# Patient Record
Sex: MALE | Race: WHITE | NOT HISPANIC OR LATINO | Employment: OTHER | URBAN - NONMETROPOLITAN AREA
[De-identification: names, ages, dates, MRNs, and addresses within clinical notes are randomized per-mention and may not be internally consistent; named-entity substitution may affect disease eponyms.]

---

## 2017-07-05 ENCOUNTER — APPOINTMENT (OUTPATIENT)
Dept: CT IMAGING | Facility: HOSPITAL | Age: 82
End: 2017-07-05

## 2017-07-05 ENCOUNTER — APPOINTMENT (OUTPATIENT)
Dept: GENERAL RADIOLOGY | Facility: HOSPITAL | Age: 82
End: 2017-07-05

## 2017-07-05 ENCOUNTER — HOSPITAL ENCOUNTER (EMERGENCY)
Facility: HOSPITAL | Age: 82
Discharge: HOME | End: 2017-07-05
Attending: EMERGENCY MEDICINE | Admitting: EMERGENCY MEDICINE

## 2017-07-05 VITALS
HEART RATE: 60 BPM | BODY MASS INDEX: 23.3 KG/M2 | DIASTOLIC BLOOD PRESSURE: 75 MMHG | SYSTOLIC BLOOD PRESSURE: 187 MMHG | WEIGHT: 145 LBS | HEIGHT: 66 IN | OXYGEN SATURATION: 98 % | RESPIRATION RATE: 18 BRPM | TEMPERATURE: 98 F

## 2017-07-05 DIAGNOSIS — M25.551 PAIN OF RIGHT HIP JOINT: ICD-10-CM
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
## 2017-07-05 DIAGNOSIS — S20.211A RIB CONTUSION, RIGHT, INITIAL ENCOUNTER: ICD-10-CM

## 2017-07-05 LAB
ALBUMIN SERPL-MCNC: 4.3 G/DL (ref 3.5–5)
ALBUMIN/GLOB SERPL: 1.4 G/DL (ref 1–2)
ALP SERPL-CCNC: 94 U/L (ref 38–126)
ALT SERPL W P-5'-P-CCNC: 43 U/L (ref 13–69)
ANION GAP SERPL CALCULATED.3IONS-SCNC: 15.7 MMOL/L
AST SERPL-CCNC: 37 U/L (ref 15–46)
BACTERIA UR QL AUTO: ABNORMAL /HPF
BILIRUB SERPL-MCNC: 0.6 MG/DL (ref 0.2–1.3)
BILIRUB UR QL STRIP: NEGATIVE
BUN BLD-MCNC: 39 MG/DL (ref 7–20)
BUN/CREAT SERPL: 26 (ref 6.3–21.9)
CALCIUM SPEC-SCNC: 9.4 MG/DL (ref 8.4–10.2)
CHLORIDE SERPL-SCNC: 106 MMOL/L (ref 98–107)
CLARITY UR: CLEAR
CO2 SERPL-SCNC: 26 MMOL/L (ref 26–30)
COLOR UR: YELLOW
CREAT BLD-MCNC: 1.5 MG/DL (ref 0.6–1.3)
GFR SERPL CREATININE-BSD FRML MDRD: 45 ML/MIN/1.73
GLOBULIN UR ELPH-MCNC: 3.1 GM/DL
GLUCOSE BLD-MCNC: 171 MG/DL (ref 74–98)
GLUCOSE UR STRIP-MCNC: NEGATIVE MG/DL
HGB UR QL STRIP.AUTO: ABNORMAL
HYALINE CASTS UR QL AUTO: ABNORMAL /LPF
KETONES UR QL STRIP: NEGATIVE
LEUKOCYTE ESTERASE UR QL STRIP.AUTO: NEGATIVE
NITRITE UR QL STRIP: NEGATIVE
PH UR STRIP.AUTO: <=5 [PH] (ref 5–8)
POTASSIUM BLD-SCNC: 4.7 MMOL/L (ref 3.5–5.1)
PROT SERPL-MCNC: 7.4 G/DL (ref 6.3–8.2)
PROT UR QL STRIP: NEGATIVE
RBC # UR: ABNORMAL /HPF
REF LAB TEST METHOD: ABNORMAL
SODIUM BLD-SCNC: 143 MMOL/L (ref 137–145)
SP GR UR STRIP: 1.01 (ref 1–1.03)
SQUAMOUS #/AREA URNS HPF: ABNORMAL /HPF
UROBILINOGEN UR QL STRIP: ABNORMAL
WBC UR QL AUTO: ABNORMAL /HPF

## 2017-07-05 PROCEDURE — 72131 CT LUMBAR SPINE W/O DYE: CPT

## 2017-07-05 PROCEDURE — 72125 CT NECK SPINE W/O DYE: CPT

## 2017-07-05 PROCEDURE — 72192 CT PELVIS W/O DYE: CPT

## 2017-07-05 PROCEDURE — 71250 CT THORAX DX C-: CPT

## 2017-07-05 PROCEDURE — 73502 X-RAY EXAM HIP UNI 2-3 VIEWS: CPT

## 2017-07-05 PROCEDURE — 99283 EMERGENCY DEPT VISIT LOW MDM: CPT

## 2017-07-05 PROCEDURE — 70450 CT HEAD/BRAIN W/O DYE: CPT

## 2017-07-05 PROCEDURE — 80053 COMPREHEN METABOLIC PANEL: CPT | Performed by: EMERGENCY MEDICINE

## 2017-07-05 PROCEDURE — 81001 URINALYSIS AUTO W/SCOPE: CPT | Performed by: EMERGENCY MEDICINE

## 2017-07-05 RX ORDER — LOSARTAN POTASSIUM 50 MG/1
100 TABLET ORAL DAILY
COMMUNITY
End: 2017-10-24 | Stop reason: HOSPADM

## 2017-07-05 NOTE — ED PROVIDER NOTES
Subjective   HPI Comments: 81-year-old male presenting with fall.  He states that he is approximately 35 feet up on a ladder leaned up against a flag pole when the flag pole gave way, he fell into a tree and then fell approximately 8 feet onto the ground onto his right side.  He does not think he struck his head or loss consciousness.  He complains of a knot on his head, severe hip pain, low back pain, right-sided chest pain.  Pain is described as achy, worse with movement, no alleviating factors.  He has surprisingly been ambulatory.  He denies any numbness or weakness.  He is on aspirin.      Review of Systems   Constitutional: Negative for chills and fever.   HENT: Negative for congestion, rhinorrhea and sore throat.    Eyes: Negative for pain.   Respiratory: Negative for cough and shortness of breath.    Cardiovascular: Negative for chest pain, palpitations and leg swelling.   Gastrointestinal: Negative for abdominal pain, diarrhea, nausea and vomiting.   Genitourinary: Negative for dysuria.   Musculoskeletal: Positive for arthralgias and back pain. Negative for neck pain.   Skin: Negative for rash.   Neurological: Negative for weakness and numbness.   Psychiatric/Behavioral: Negative for behavioral problems.       Past Medical History:   Diagnosis Date   • Hypertension        No Known Allergies    Past Surgical History:   Procedure Laterality Date   • ORTHOPEDIC SURGERY         History reviewed. No pertinent family history.    Social History     Social History   • Marital status:      Spouse name: N/A   • Number of children: N/A   • Years of education: N/A     Social History Main Topics   • Smoking status: Never Smoker   • Smokeless tobacco: None   • Alcohol use No   • Drug use: No   • Sexual activity: Not Asked     Other Topics Concern   • None     Social History Narrative   • None           Objective   Physical Exam   Constitutional: He is oriented to person, place, and time. He appears well-developed  and well-nourished. No distress.   HENT:   Head: Normocephalic.   Right Ear: External ear normal.   Left Ear: External ear normal.   Nose: Nose normal.   Mouth/Throat: Oropharynx is clear and moist.   Small hematoma to the posterior frontal scalp, occlusion normal, midface stable   Eyes: Conjunctivae and EOM are normal. Pupils are equal, round, and reactive to light.   Neck: Normal range of motion. Neck supple.   No midline tenderness, full range of motion   Cardiovascular: Normal rate, regular rhythm, normal heart sounds and intact distal pulses.    Pulmonary/Chest: Effort normal and breath sounds normal. No respiratory distress.   Exquisite tenderness over the right anterior chest and right lateral chest, no crepitus   Abdominal: Soft. Bowel sounds are normal. He exhibits no distension. There is no tenderness. There is no rebound and no guarding.   Musculoskeletal: He exhibits no edema or deformity.   Exquisite tenderness over the right hip and right low back, all other extremities/joints stable without tenderness   Neurological: He is alert and oriented to person, place, and time.   Normal strength and sensation bilateral upper and lower extremities, gait normal   Skin: Skin is warm and dry. No rash noted.   No contusions, lacerations, abrasions   Psychiatric: He has a normal mood and affect. His behavior is normal.   Nursing note and vitals reviewed.      Procedures         ED Course  ED Course                  MDM  Number of Diagnoses or Management Options  Fall, initial encounter:   Pain of right hip joint:   Rib contusion, right, initial encounter:   Diagnosis management comments: 81-year-old male with significant fall from height.  Well-developed, well-nourished elderly man in no distress with normal vital signs and exam as above.  Given the mechanism and his age we'll check CT scans of his head, neck, chest.  We'll check x-rays of the hip.  He has declined any pain medication at this time.  Disposition  pending workup.    DDX: Fall, trauma, ICH, fracture, contusion, musculoskeletal pain    Imaging is all without acute findings.  He continues to decline any pain medication for here or at discharge.  We'll have him follow-up with his primary doctor for recheck.  He is comfortable with and understanding of the plan.      Final diagnoses:   Fall, initial encounter   Rib contusion, right, initial encounter   Pain of right hip joint            Keagan Shaw MD  07/05/17 1125

## 2017-10-21 ENCOUNTER — HOSPITAL ENCOUNTER (INPATIENT)
Facility: HOSPITAL | Age: 82
LOS: 3 days | Discharge: HOME | End: 2017-10-24
Attending: EMERGENCY MEDICINE | Admitting: INTERNAL MEDICINE

## 2017-10-21 ENCOUNTER — APPOINTMENT (OUTPATIENT)
Dept: GENERAL RADIOLOGY | Facility: HOSPITAL | Age: 82
End: 2017-10-21

## 2017-10-21 DIAGNOSIS — I21.4 NSTEMI (NON-ST ELEVATED MYOCARDIAL INFARCTION) (HCC): Primary | ICD-10-CM

## 2017-10-21 LAB
ALBUMIN SERPL-MCNC: 4.1 G/DL (ref 3.5–5)
ALBUMIN/GLOB SERPL: 1.5 G/DL (ref 1–2)
ALP SERPL-CCNC: 109 U/L (ref 38–126)
ALT SERPL W P-5'-P-CCNC: 36 U/L (ref 13–69)
ANION GAP SERPL CALCULATED.3IONS-SCNC: 17.2 MMOL/L
APTT PPP: 25.1 SECONDS (ref 25–36)
AST SERPL-CCNC: 26 U/L (ref 15–46)
BASOPHILS # BLD AUTO: 0.04 10*3/MM3 (ref 0–0.2)
BASOPHILS NFR BLD AUTO: 0.2 % (ref 0–2.5)
BILIRUB SERPL-MCNC: 0.3 MG/DL (ref 0.2–1.3)
BUN BLD-MCNC: 17 MG/DL (ref 7–20)
BUN/CREAT SERPL: 11.3 (ref 6.3–21.9)
CALCIUM SPEC-SCNC: 8.3 MG/DL (ref 8.4–10.2)
CHLORIDE SERPL-SCNC: 103 MMOL/L (ref 98–107)
CO2 SERPL-SCNC: 24 MMOL/L (ref 26–30)
CREAT BLD-MCNC: 1.5 MG/DL (ref 0.6–1.3)
DEPRECATED RDW RBC AUTO: 39.3 FL (ref 37–54)
EOSINOPHIL # BLD AUTO: 0.2 10*3/MM3 (ref 0–0.7)
EOSINOPHIL NFR BLD AUTO: 1.2 % (ref 0–7)
ERYTHROCYTE [DISTWIDTH] IN BLOOD BY AUTOMATED COUNT: 11.6 % (ref 11.5–14.5)
GFR SERPL CREATININE-BSD FRML MDRD: 45 ML/MIN/1.73
GLOBULIN UR ELPH-MCNC: 2.8 GM/DL
GLUCOSE BLD-MCNC: 212 MG/DL (ref 74–98)
HCT VFR BLD AUTO: 36.8 % (ref 42–52)
HGB BLD-MCNC: 12.4 G/DL (ref 14–18)
HOLD SPECIMEN: NORMAL
HOLD SPECIMEN: NORMAL
IMM GRANULOCYTES # BLD: 0.09 10*3/MM3 (ref 0–0.06)
IMM GRANULOCYTES NFR BLD: 0.6 % (ref 0–0.6)
INR PPP: 1.03 (ref 0.9–1.1)
LYMPHOCYTES # BLD AUTO: 1.84 10*3/MM3 (ref 0.6–3.4)
LYMPHOCYTES NFR BLD AUTO: 11.5 % (ref 10–50)
MCH RBC QN AUTO: 30.9 PG (ref 27–31)
MCHC RBC AUTO-ENTMCNC: 33.7 G/DL (ref 30–37)
MCV RBC AUTO: 91.8 FL (ref 80–94)
MONOCYTES # BLD AUTO: 1.1 10*3/MM3 (ref 0–0.9)
MONOCYTES NFR BLD AUTO: 6.9 % (ref 0–12)
NEUTROPHILS # BLD AUTO: 12.75 10*3/MM3 (ref 2–6.9)
NEUTROPHILS NFR BLD AUTO: 79.6 % (ref 37–80)
NRBC BLD MANUAL-RTO: 0 /100 WBC (ref 0–0)
PLATELET # BLD AUTO: 261 10*3/MM3 (ref 130–400)
PMV BLD AUTO: 11.2 FL (ref 6–12)
POTASSIUM BLD-SCNC: 4.2 MMOL/L (ref 3.5–5.1)
PROT SERPL-MCNC: 6.9 G/DL (ref 6.3–8.2)
PROTHROMBIN TIME: 11.3 SECONDS (ref 9.3–12.1)
RBC # BLD AUTO: 4.01 10*6/MM3 (ref 4.7–6.1)
SODIUM BLD-SCNC: 140 MMOL/L (ref 137–145)
TROPONIN I SERPL-MCNC: 0.11 NG/ML (ref 0–0.03)
TROPONIN I SERPL-MCNC: 0.13 NG/ML (ref 0–0.05)
WBC NRBC COR # BLD: 16.02 10*3/MM3 (ref 4.8–10.8)
WHOLE BLOOD HOLD SPECIMEN: NORMAL
WHOLE BLOOD HOLD SPECIMEN: NORMAL

## 2017-10-21 PROCEDURE — 85730 THROMBOPLASTIN TIME PARTIAL: CPT | Performed by: EMERGENCY MEDICINE

## 2017-10-21 PROCEDURE — 99222 1ST HOSP IP/OBS MODERATE 55: CPT | Performed by: INTERNAL MEDICINE

## 2017-10-21 PROCEDURE — 93005 ELECTROCARDIOGRAM TRACING: CPT | Performed by: EMERGENCY MEDICINE

## 2017-10-21 PROCEDURE — 99223 1ST HOSP IP/OBS HIGH 75: CPT | Performed by: INTERNAL MEDICINE

## 2017-10-21 PROCEDURE — 85610 PROTHROMBIN TIME: CPT | Performed by: EMERGENCY MEDICINE

## 2017-10-21 PROCEDURE — 99285 EMERGENCY DEPT VISIT HI MDM: CPT

## 2017-10-21 PROCEDURE — 25010000002 HEPARIN (PORCINE) PER 1000 UNITS: Performed by: EMERGENCY MEDICINE

## 2017-10-21 PROCEDURE — 80053 COMPREHEN METABOLIC PANEL: CPT | Performed by: EMERGENCY MEDICINE

## 2017-10-21 PROCEDURE — 85025 COMPLETE CBC W/AUTO DIFF WBC: CPT | Performed by: EMERGENCY MEDICINE

## 2017-10-21 PROCEDURE — 71020 HC CHEST PA AND LATERAL: CPT

## 2017-10-21 PROCEDURE — 84484 ASSAY OF TROPONIN QUANT: CPT | Performed by: EMERGENCY MEDICINE

## 2017-10-21 RX ORDER — SODIUM CHLORIDE 9 MG/ML
100 INJECTION, SOLUTION INTRAVENOUS CONTINUOUS
Status: ACTIVE | OUTPATIENT
Start: 2017-10-21 | End: 2017-10-23

## 2017-10-21 RX ORDER — SODIUM CHLORIDE 0.9 % (FLUSH) 0.9 %
1-10 SYRINGE (ML) INJECTION AS NEEDED
Status: DISCONTINUED | OUTPATIENT
Start: 2017-10-21 | End: 2017-10-24 | Stop reason: HOSPADM

## 2017-10-21 RX ORDER — MORPHINE SULFATE 2 MG/ML
4 INJECTION, SOLUTION INTRAMUSCULAR; INTRAVENOUS
Status: DISCONTINUED | OUTPATIENT
Start: 2017-10-21 | End: 2017-10-23

## 2017-10-21 RX ORDER — NALOXONE HCL 0.4 MG/ML
0.4 VIAL (ML) INJECTION
Status: DISCONTINUED | OUTPATIENT
Start: 2017-10-21 | End: 2017-10-24 | Stop reason: HOSPADM

## 2017-10-21 RX ORDER — HEPARIN SODIUM 1000 [USP'U]/ML
60 INJECTION INTRAVENOUS; SUBCUTANEOUS AS NEEDED
Status: DISCONTINUED | OUTPATIENT
Start: 2017-10-21 | End: 2017-10-23

## 2017-10-21 RX ORDER — ASPIRIN 81 MG/1
81 TABLET ORAL DAILY
Status: DISCONTINUED | OUTPATIENT
Start: 2017-10-22 | End: 2017-10-22 | Stop reason: SDUPTHER

## 2017-10-21 RX ORDER — SODIUM CHLORIDE 9 MG/ML
100 INJECTION, SOLUTION INTRAVENOUS CONTINUOUS
Status: ACTIVE | OUTPATIENT
Start: 2017-10-22 | End: 2017-10-22

## 2017-10-21 RX ORDER — HEPARIN SODIUM 1000 [USP'U]/ML
30 INJECTION INTRAVENOUS; SUBCUTANEOUS AS NEEDED
Status: DISCONTINUED | OUTPATIENT
Start: 2017-10-21 | End: 2017-10-23

## 2017-10-21 RX ORDER — HEPARIN SODIUM 1000 [USP'U]/ML
60 INJECTION, SOLUTION INTRAVENOUS; SUBCUTANEOUS ONCE
Status: COMPLETED | OUTPATIENT
Start: 2017-10-21 | End: 2017-10-21

## 2017-10-21 RX ORDER — ONDANSETRON 2 MG/ML
4 INJECTION INTRAMUSCULAR; INTRAVENOUS EVERY 6 HOURS PRN
Status: DISCONTINUED | OUTPATIENT
Start: 2017-10-21 | End: 2017-10-24 | Stop reason: HOSPADM

## 2017-10-21 RX ORDER — SODIUM CHLORIDE 0.9 % (FLUSH) 0.9 %
10 SYRINGE (ML) INJECTION AS NEEDED
Status: DISCONTINUED | OUTPATIENT
Start: 2017-10-21 | End: 2017-10-24 | Stop reason: HOSPADM

## 2017-10-21 RX ORDER — ASPIRIN 81 MG/1
81 TABLET ORAL DAILY
Status: DISCONTINUED | OUTPATIENT
Start: 2017-10-21 | End: 2017-10-22 | Stop reason: SDUPTHER

## 2017-10-21 RX ORDER — MORPHINE SULFATE 2 MG/ML
2 INJECTION, SOLUTION INTRAMUSCULAR; INTRAVENOUS EVERY 4 HOURS PRN
Status: DISCONTINUED | OUTPATIENT
Start: 2017-10-21 | End: 2017-10-24 | Stop reason: HOSPADM

## 2017-10-21 RX ORDER — PANTOPRAZOLE SODIUM 40 MG/1
40 TABLET, DELAYED RELEASE ORAL
Status: DISCONTINUED | OUTPATIENT
Start: 2017-10-22 | End: 2017-10-24 | Stop reason: HOSPADM

## 2017-10-21 RX ORDER — NITROGLYCERIN 20 MG/100ML
5-200 INJECTION INTRAVENOUS
Status: DISCONTINUED | OUTPATIENT
Start: 2017-10-21 | End: 2017-10-23

## 2017-10-21 RX ORDER — NITROGLYCERIN 0.4 MG/1
0.4 TABLET SUBLINGUAL
Status: DISCONTINUED | OUTPATIENT
Start: 2017-10-21 | End: 2017-10-24 | Stop reason: HOSPADM

## 2017-10-21 RX ADMIN — HEPARIN SODIUM 3950 UNITS: 1000 INJECTION, SOLUTION INTRAVENOUS; SUBCUTANEOUS at 19:16

## 2017-10-21 RX ADMIN — HEPARIN SODIUM 12 UNITS/KG/HR: 10000 INJECTION, SOLUTION INTRAVENOUS at 19:17

## 2017-10-21 RX ADMIN — NITROGLYCERIN 5 MCG/MIN: 20 INJECTION INTRAVENOUS at 19:17

## 2017-10-21 RX ADMIN — NITROGLYCERIN 0.4 MG: 0.4 TABLET SUBLINGUAL at 18:46

## 2017-10-21 NOTE — ED PROVIDER NOTES
7:06 PM Assumed care from off-going team. Briefly, this is a 82 y.o. M here w/ CP w/ associated nausea and diaphoresis who is awaiting CBC w/ known elevated troponin and dynamic ST depressions since arrival. Cardiology aware and asked that patient be placed on heparin and NTG gtt and admitted to medicine. Plan to f/u on labs and discuss w/ hospitalist.      7:07 PM CBC shows WBC 16. Creatinine at baseline 1.5.     7:44 PM CXR shows patchy opacity R base but nothing to compare with. Discussed w/ hospitalist and will admit for further care. ICU bed requested.        Ryan Green MD  10/21/17 1945

## 2017-10-21 NOTE — ED PROVIDER NOTES
Subjective   Patient is a 82 y.o. male presenting with chest pain.   History provided by:  Patient   used: No    Chest Pain   Pain location:  Substernal area  Pain quality: aching, pressure and tightness    Pain radiates to:  Does not radiate  Onset quality:  Sudden  Timing:  Constant  Chronicity:  New  Context comment:  Changing a tire on the car.   Relieved by:  Nitroglycerin  Worsened by:  Nothing  Ineffective treatments:  None tried  Associated symptoms: nausea and vomiting    Associated symptoms: no abdominal pain, no anxiety, no back pain, no cough, no diaphoresis, no dizziness, no dysphagia, no fatigue, no fever, no heartburn, no near-syncope, no numbness, no palpitations, no shortness of breath, no syncope and no weakness    Risk factors: hypertension    Risk factors: no high cholesterol and no smoking        Review of Systems   Constitutional: Negative for chills, diaphoresis, fatigue and fever.   HENT: Negative for congestion, rhinorrhea, sore throat and trouble swallowing.    Eyes: Negative for discharge and visual disturbance.   Respiratory: Negative for cough, chest tightness, shortness of breath and wheezing.    Cardiovascular: Positive for chest pain. Negative for palpitations, leg swelling, syncope and near-syncope.   Gastrointestinal: Positive for nausea and vomiting. Negative for abdominal pain, constipation, diarrhea and heartburn.   Genitourinary: Negative for dysuria, flank pain and hematuria.   Musculoskeletal: Negative for back pain, myalgias and neck pain.   Skin: Negative for color change and rash.   Neurological: Negative for dizziness, weakness and numbness.   Psychiatric/Behavioral: Negative for self-injury and suicidal ideas.       Past Medical History:   Diagnosis Date   • Bradycardia    • Disease of thyroid gland    • History of stomach ulcers    • Hypertension        No Known Allergies    Past Surgical History:   Procedure Laterality Date   • ORTHOPEDIC SURGERY      • PACEMAKER IMPLANTATION         History reviewed. No pertinent family history.    Social History     Social History   • Marital status:      Spouse name: N/A   • Number of children: N/A   • Years of education: N/A     Social History Main Topics   • Smoking status: Never Smoker   • Smokeless tobacco: None   • Alcohol use No   • Drug use: No   • Sexual activity: Not Asked     Other Topics Concern   • None     Social History Narrative   • None           Objective   Physical Exam   Constitutional: He is oriented to person, place, and time. He appears well-developed and well-nourished.   HENT:   Head: Normocephalic and atraumatic.   Nose: Nose normal.   Mouth/Throat: Oropharynx is clear and moist.   Eyes: Conjunctivae and EOM are normal. Pupils are equal, round, and reactive to light.   Neck: Normal range of motion. Neck supple.   Cardiovascular: Normal rate, regular rhythm, normal heart sounds and intact distal pulses.    Pulmonary/Chest: Effort normal and breath sounds normal. No respiratory distress. He has no wheezes. He exhibits no tenderness.   Abdominal: Soft. Bowel sounds are normal. There is no tenderness. There is no rebound and no guarding.   Musculoskeletal: Normal range of motion. He exhibits no edema, tenderness or deformity.   Neurological: He is alert and oriented to person, place, and time. No cranial nerve deficit. Coordination normal.   Skin: Skin is warm and dry. No rash noted. No erythema. No pallor.   Psychiatric: He has a normal mood and affect. His behavior is normal. Judgment and thought content normal.   Nursing note and vitals reviewed.      Procedures         ED Course  ED Course                  MDM  Number of Diagnoses or Management Options  NSTEMI (non-ST elevated myocardial infarction):   Diagnosis management comments: KG: Ventricular rate 60, AZ interval 0, QTC 46, castration 86, no acute process.    82-year-old male presents emergency department by EMS with complaints of chest  pain.  Patient states that the pain started around 1600 when he was changing the tire of a car.  He began having a pressure sensation in the center of his chest with associated nausea and vomiting.  Patient states that he tried to lay down to see if it would stop.  Patient states that the pain continued.  He denies any alleviating or exacerbating factors.  Patient contacted EMS and he was given aspirin and nitroglycerin.  Patient states that his pain went from a 10 out of 10 to a 4 out of 10 with nitroglycerin.  Patient denies a history of coronary artery disease.  He states that he does have a pacemaker.  Patient is not on any blood thinners.      Troponin obtained and is elevated.  Repeat EKG done.  ST segment depression appreciated in leads 1, 2, 3, aVF, V3, V4, V5, V6.  I contacted the cardiologist on call, Dr. Hicks.  He would like the patient started on a heparin and nitroglycerin drip.  He would like the patient to be admitted to the hospitalist with a consult to himself.  He would like the hospitalist to contact him if the patient's condition worsens.      Final diagnoses:   NSTEMI (non-ST elevated myocardial infarction)            Shelly Taylor MD  10/22/17 0746       Shelly Taylor MD  10/22/17 0746       Shelly Taylor MD  11/10/17 0885

## 2017-10-22 ENCOUNTER — APPOINTMENT (OUTPATIENT)
Dept: CARDIOLOGY | Facility: HOSPITAL | Age: 82
End: 2017-10-22
Attending: INTERNAL MEDICINE

## 2017-10-22 PROBLEM — R00.1 BRADYCARDIA: Chronic | Status: ACTIVE | Noted: 2017-10-22

## 2017-10-22 PROBLEM — I10 ESSENTIAL HYPERTENSION: Status: ACTIVE | Noted: 2017-10-22

## 2017-10-22 LAB
ACT BLD: 233 SECONDS (ref 82–152)
ALBUMIN SERPL-MCNC: 3.6 G/DL (ref 3.5–5)
ALBUMIN/GLOB SERPL: 1.3 G/DL (ref 1–2)
ALP SERPL-CCNC: 114 U/L (ref 38–126)
ALT SERPL W P-5'-P-CCNC: 41 U/L (ref 13–69)
ANION GAP SERPL CALCULATED.3IONS-SCNC: 13.8 MMOL/L
APTT PPP: 103 SECONDS (ref 25–36)
APTT PPP: 34.4 SECONDS (ref 25–36)
AST SERPL-CCNC: 115 U/L (ref 15–46)
BASOPHILS # BLD AUTO: 0.03 10*3/MM3 (ref 0–0.2)
BASOPHILS NFR BLD AUTO: 0.3 % (ref 0–2.5)
BILIRUB SERPL-MCNC: 0.5 MG/DL (ref 0.2–1.3)
BUN BLD-MCNC: 15 MG/DL (ref 7–20)
BUN/CREAT SERPL: 12.5 (ref 6.3–21.9)
CALCIUM SPEC-SCNC: 8.5 MG/DL (ref 8.4–10.2)
CHLORIDE SERPL-SCNC: 108 MMOL/L (ref 98–107)
CHOLEST SERPL-MCNC: 185 MG/DL (ref 0–199)
CO2 SERPL-SCNC: 25 MMOL/L (ref 26–30)
CREAT BLD-MCNC: 1.2 MG/DL (ref 0.6–1.3)
DEPRECATED RDW RBC AUTO: 38.9 FL (ref 37–54)
EOSINOPHIL # BLD AUTO: 0.19 10*3/MM3 (ref 0–0.7)
EOSINOPHIL NFR BLD AUTO: 1.8 % (ref 0–7)
ERYTHROCYTE [DISTWIDTH] IN BLOOD BY AUTOMATED COUNT: 11.7 % (ref 11.5–14.5)
GFR SERPL CREATININE-BSD FRML MDRD: 58 ML/MIN/1.73
GLOBULIN UR ELPH-MCNC: 2.8 GM/DL
GLUCOSE BLD-MCNC: 151 MG/DL (ref 74–98)
HCT VFR BLD AUTO: 37.2 % (ref 42–52)
HDLC SERPL-MCNC: 51 MG/DL (ref 40–60)
HGB BLD-MCNC: 12.5 G/DL (ref 14–18)
IMM GRANULOCYTES # BLD: 0.05 10*3/MM3 (ref 0–0.06)
IMM GRANULOCYTES NFR BLD: 0.5 % (ref 0–0.6)
LDLC SERPL CALC-MCNC: 119 MG/DL (ref 0–99)
LDLC/HDLC SERPL: 2.33 {RATIO}
LYMPHOCYTES # BLD AUTO: 2.95 10*3/MM3 (ref 0.6–3.4)
LYMPHOCYTES NFR BLD AUTO: 27.7 % (ref 10–50)
MAGNESIUM SERPL-MCNC: 2 MG/DL (ref 1.6–2.3)
MCH RBC QN AUTO: 30.5 PG (ref 27–31)
MCHC RBC AUTO-ENTMCNC: 33.6 G/DL (ref 30–37)
MCV RBC AUTO: 90.7 FL (ref 80–94)
MONOCYTES # BLD AUTO: 1.17 10*3/MM3 (ref 0–0.9)
MONOCYTES NFR BLD AUTO: 11 % (ref 0–12)
NEUTROPHILS # BLD AUTO: 6.26 10*3/MM3 (ref 2–6.9)
NEUTROPHILS NFR BLD AUTO: 58.7 % (ref 37–80)
NRBC BLD MANUAL-RTO: 0 /100 WBC (ref 0–0)
PHOSPHATE SERPL-MCNC: 2.7 MG/DL (ref 2.5–4.5)
PLATELET # BLD AUTO: 225 10*3/MM3 (ref 130–400)
PMV BLD AUTO: 12.2 FL (ref 6–12)
POTASSIUM BLD-SCNC: 3.8 MMOL/L (ref 3.5–5.1)
PROT SERPL-MCNC: 6.4 G/DL (ref 6.3–8.2)
RBC # BLD AUTO: 4.1 10*6/MM3 (ref 4.7–6.1)
SODIUM BLD-SCNC: 143 MMOL/L (ref 137–145)
TRIGL SERPL-MCNC: 76 MG/DL
TROPONIN I SERPL-MCNC: 17.2 NG/ML (ref 0–0.03)
TROPONIN I SERPL-MCNC: 23.6 NG/ML (ref 0–0.03)
TSH SERPL DL<=0.05 MIU/L-ACNC: 1.06 MIU/ML (ref 0.47–4.68)
VLDLC SERPL-MCNC: 15.2 MG/DL
WBC NRBC COR # BLD: 10.65 10*3/MM3 (ref 4.8–10.8)

## 2017-10-22 PROCEDURE — 25010000002 MIDAZOLAM PER 1 MG: Performed by: INTERNAL MEDICINE

## 2017-10-22 PROCEDURE — 25010000002 MORPHINE PER 10 MG: Performed by: INTERNAL MEDICINE

## 2017-10-22 PROCEDURE — 99233 SBSQ HOSP IP/OBS HIGH 50: CPT | Performed by: FAMILY MEDICINE

## 2017-10-22 PROCEDURE — 93454 CORONARY ARTERY ANGIO S&I: CPT | Performed by: INTERNAL MEDICINE

## 2017-10-22 PROCEDURE — C1887 CATHETER, GUIDING: HCPCS | Performed by: INTERNAL MEDICINE

## 2017-10-22 PROCEDURE — 84484 ASSAY OF TROPONIN QUANT: CPT | Performed by: INTERNAL MEDICINE

## 2017-10-22 PROCEDURE — C9600 PERC DRUG-EL COR STENT SING: HCPCS | Performed by: INTERNAL MEDICINE

## 2017-10-22 PROCEDURE — 027044Z DILATION OF CORONARY ARTERY, ONE ARTERY WITH DRUG-ELUTING INTRALUMINAL DEVICE, PERCUTANEOUS ENDOSCOPIC APPROACH: ICD-10-PCS | Performed by: INTERNAL MEDICINE

## 2017-10-22 PROCEDURE — 93325 DOPPLER ECHO COLOR FLOW MAPG: CPT

## 2017-10-22 PROCEDURE — C1894 INTRO/SHEATH, NON-LASER: HCPCS | Performed by: INTERNAL MEDICINE

## 2017-10-22 PROCEDURE — 93308 TTE F-UP OR LMTD: CPT | Performed by: INTERNAL MEDICINE

## 2017-10-22 PROCEDURE — 93321 DOPPLER ECHO F-UP/LMTD STD: CPT

## 2017-10-22 PROCEDURE — C1769 GUIDE WIRE: HCPCS | Performed by: INTERNAL MEDICINE

## 2017-10-22 PROCEDURE — 93325 DOPPLER ECHO COLOR FLOW MAPG: CPT | Performed by: INTERNAL MEDICINE

## 2017-10-22 PROCEDURE — 84100 ASSAY OF PHOSPHORUS: CPT | Performed by: INTERNAL MEDICINE

## 2017-10-22 PROCEDURE — 4A023N7 MEASUREMENT OF CARDIAC SAMPLING AND PRESSURE, LEFT HEART, PERCUTANEOUS APPROACH: ICD-10-PCS | Performed by: INTERNAL MEDICINE

## 2017-10-22 PROCEDURE — B2111ZZ FLUOROSCOPY OF MULTIPLE CORONARY ARTERIES USING LOW OSMOLAR CONTRAST: ICD-10-PCS | Performed by: INTERNAL MEDICINE

## 2017-10-22 PROCEDURE — 83735 ASSAY OF MAGNESIUM: CPT | Performed by: INTERNAL MEDICINE

## 2017-10-22 PROCEDURE — 0 IOPAMIDOL PER 1 ML: Performed by: INTERNAL MEDICINE

## 2017-10-22 PROCEDURE — 85347 COAGULATION TIME ACTIVATED: CPT

## 2017-10-22 PROCEDURE — 92928 PRQ TCAT PLMT NTRAC ST 1 LES: CPT | Performed by: INTERNAL MEDICINE

## 2017-10-22 PROCEDURE — 93321 DOPPLER ECHO F-UP/LMTD STD: CPT | Performed by: INTERNAL MEDICINE

## 2017-10-22 PROCEDURE — 85730 THROMBOPLASTIN TIME PARTIAL: CPT | Performed by: INTERNAL MEDICINE

## 2017-10-22 PROCEDURE — 85025 COMPLETE CBC W/AUTO DIFF WBC: CPT | Performed by: INTERNAL MEDICINE

## 2017-10-22 PROCEDURE — 80061 LIPID PANEL: CPT | Performed by: INTERNAL MEDICINE

## 2017-10-22 PROCEDURE — 94799 UNLISTED PULMONARY SVC/PX: CPT

## 2017-10-22 PROCEDURE — C1874 STENT, COATED/COV W/DEL SYS: HCPCS | Performed by: INTERNAL MEDICINE

## 2017-10-22 PROCEDURE — 84443 ASSAY THYROID STIM HORMONE: CPT | Performed by: INTERNAL MEDICINE

## 2017-10-22 PROCEDURE — 93308 TTE F-UP OR LMTD: CPT

## 2017-10-22 PROCEDURE — 25010000002 HEPARIN (PORCINE) PER 1000 UNITS: Performed by: EMERGENCY MEDICINE

## 2017-10-22 PROCEDURE — 93005 ELECTROCARDIOGRAM TRACING: CPT | Performed by: INTERNAL MEDICINE

## 2017-10-22 PROCEDURE — 25010000002 HEPARIN (PORCINE) PER 1000 UNITS: Performed by: INTERNAL MEDICINE

## 2017-10-22 PROCEDURE — 80053 COMPREHEN METABOLIC PANEL: CPT | Performed by: INTERNAL MEDICINE

## 2017-10-22 PROCEDURE — C1725 CATH, TRANSLUMIN NON-LASER: HCPCS | Performed by: INTERNAL MEDICINE

## 2017-10-22 DEVICE — XIENCE ALPINE EVEROLIMUS ELUTING CORONARY STENT SYSTEM 2.75 MM X 15 MM / RAPID-EXCHANGE
Type: IMPLANTABLE DEVICE | Status: FUNCTIONAL
Brand: XIENCE ALPINE

## 2017-10-22 RX ORDER — LOSARTAN POTASSIUM 50 MG/1
50 TABLET ORAL DAILY
Status: DISCONTINUED | OUTPATIENT
Start: 2017-10-22 | End: 2017-10-24 | Stop reason: HOSPADM

## 2017-10-22 RX ORDER — ASPIRIN 81 MG/1
81 TABLET, CHEWABLE ORAL DAILY
Status: DISCONTINUED | OUTPATIENT
Start: 2017-10-23 | End: 2017-10-24 | Stop reason: HOSPADM

## 2017-10-22 RX ORDER — CARVEDILOL 25 MG/1
25 TABLET ORAL 2 TIMES DAILY
Status: DISCONTINUED | OUTPATIENT
Start: 2017-10-22 | End: 2017-10-24 | Stop reason: HOSPADM

## 2017-10-22 RX ORDER — NITROGLYCERIN 5 MG/ML
INJECTION, SOLUTION INTRAVENOUS AS NEEDED
Status: DISCONTINUED | OUTPATIENT
Start: 2017-10-22 | End: 2017-10-22 | Stop reason: HOSPADM

## 2017-10-22 RX ORDER — SODIUM CHLORIDE 9 MG/ML
100 INJECTION, SOLUTION INTRAVENOUS CONTINUOUS
Status: ACTIVE | OUTPATIENT
Start: 2017-10-22 | End: 2017-10-22

## 2017-10-22 RX ORDER — ATORVASTATIN CALCIUM 80 MG/1
80 TABLET, FILM COATED ORAL NIGHTLY
Status: DISCONTINUED | OUTPATIENT
Start: 2017-10-22 | End: 2017-10-24 | Stop reason: HOSPADM

## 2017-10-22 RX ORDER — DOCUSATE SODIUM 100 MG/1
100 CAPSULE, LIQUID FILLED ORAL 2 TIMES DAILY
Status: DISCONTINUED | OUTPATIENT
Start: 2017-10-22 | End: 2017-10-24 | Stop reason: HOSPADM

## 2017-10-22 RX ORDER — TEMAZEPAM 15 MG/1
15 CAPSULE ORAL NIGHTLY PRN
Status: DISCONTINUED | OUTPATIENT
Start: 2017-10-22 | End: 2017-10-24 | Stop reason: HOSPADM

## 2017-10-22 RX ORDER — NALOXONE HCL 0.4 MG/ML
0.4 VIAL (ML) INJECTION
Status: DISCONTINUED | OUTPATIENT
Start: 2017-10-22 | End: 2017-10-24 | Stop reason: HOSPADM

## 2017-10-22 RX ORDER — SENNA AND DOCUSATE SODIUM 50; 8.6 MG/1; MG/1
2 TABLET, FILM COATED ORAL NIGHTLY
Status: DISCONTINUED | OUTPATIENT
Start: 2017-10-22 | End: 2017-10-24 | Stop reason: HOSPADM

## 2017-10-22 RX ORDER — MIDAZOLAM HYDROCHLORIDE 1 MG/ML
INJECTION INTRAMUSCULAR; INTRAVENOUS AS NEEDED
Status: DISCONTINUED | OUTPATIENT
Start: 2017-10-22 | End: 2017-10-22 | Stop reason: HOSPADM

## 2017-10-22 RX ORDER — LIDOCAINE HYDROCHLORIDE 10 MG/ML
INJECTION, SOLUTION INFILTRATION; PERINEURAL AS NEEDED
Status: DISCONTINUED | OUTPATIENT
Start: 2017-10-22 | End: 2017-10-22 | Stop reason: HOSPADM

## 2017-10-22 RX ORDER — BISACODYL 10 MG
10 SUPPOSITORY, RECTAL RECTAL DAILY PRN
Status: DISCONTINUED | OUTPATIENT
Start: 2017-10-22 | End: 2017-10-24 | Stop reason: HOSPADM

## 2017-10-22 RX ORDER — LORAZEPAM 2 MG/ML
1 INJECTION INTRAMUSCULAR EVERY 6 HOURS PRN
Status: DISCONTINUED | OUTPATIENT
Start: 2017-10-22 | End: 2017-10-23

## 2017-10-22 RX ORDER — HEPARIN SODIUM 1000 [USP'U]/ML
INJECTION, SOLUTION INTRAVENOUS; SUBCUTANEOUS AS NEEDED
Status: DISCONTINUED | OUTPATIENT
Start: 2017-10-22 | End: 2017-10-22 | Stop reason: HOSPADM

## 2017-10-22 RX ORDER — ONDANSETRON 2 MG/ML
4 INJECTION INTRAMUSCULAR; INTRAVENOUS EVERY 6 HOURS PRN
Status: DISCONTINUED | OUTPATIENT
Start: 2017-10-22 | End: 2017-10-24 | Stop reason: HOSPADM

## 2017-10-22 RX ORDER — ONDANSETRON 4 MG/1
4 TABLET, FILM COATED ORAL EVERY 6 HOURS PRN
Status: DISCONTINUED | OUTPATIENT
Start: 2017-10-22 | End: 2017-10-24 | Stop reason: HOSPADM

## 2017-10-22 RX ORDER — AMLODIPINE BESYLATE 5 MG/1
5 TABLET ORAL DAILY
Status: DISCONTINUED | OUTPATIENT
Start: 2017-10-22 | End: 2017-10-24 | Stop reason: HOSPADM

## 2017-10-22 RX ORDER — DIPHENHYDRAMINE HCL 25 MG
25 CAPSULE ORAL EVERY 6 HOURS PRN
Status: DISCONTINUED | OUTPATIENT
Start: 2017-10-22 | End: 2017-10-24 | Stop reason: HOSPADM

## 2017-10-22 RX ORDER — ONDANSETRON 4 MG/1
4 TABLET, ORALLY DISINTEGRATING ORAL EVERY 6 HOURS PRN
Status: DISCONTINUED | OUTPATIENT
Start: 2017-10-22 | End: 2017-10-24 | Stop reason: HOSPADM

## 2017-10-22 RX ADMIN — ASPIRIN 81 MG: 81 TABLET, COATED ORAL at 08:33

## 2017-10-22 RX ADMIN — TICAGRELOR 180 MG: 90 TABLET ORAL at 00:41

## 2017-10-22 RX ADMIN — SODIUM CHLORIDE 5 MG/HR: 9 INJECTION, SOLUTION INTRAVENOUS at 02:53

## 2017-10-22 RX ADMIN — HEPARIN SODIUM 1970 UNITS: 1000 INJECTION, SOLUTION INTRAVENOUS; SUBCUTANEOUS at 01:49

## 2017-10-22 RX ADMIN — NITROGLYCERIN 20 MCG/MIN: 20 INJECTION INTRAVENOUS at 07:30

## 2017-10-22 RX ADMIN — SODIUM CHLORIDE 100 ML/HR: 9 INJECTION, SOLUTION INTRAVENOUS at 20:35

## 2017-10-22 RX ADMIN — SODIUM CHLORIDE 5 MG/HR: 9 INJECTION, SOLUTION INTRAVENOUS at 08:43

## 2017-10-22 RX ADMIN — SODIUM CHLORIDE 100 ML/HR: 9 INJECTION, SOLUTION INTRAVENOUS at 00:42

## 2017-10-22 RX ADMIN — AMLODIPINE BESYLATE 5 MG: 5 TABLET ORAL at 14:01

## 2017-10-22 RX ADMIN — LOSARTAN POTASSIUM 50 MG: 50 TABLET, FILM COATED ORAL at 14:01

## 2017-10-22 RX ADMIN — TICAGRELOR 90 MG: 90 TABLET ORAL at 20:32

## 2017-10-22 RX ADMIN — CARVEDILOL 25 MG: 25 TABLET, FILM COATED ORAL at 20:32

## 2017-10-22 RX ADMIN — ATORVASTATIN CALCIUM 80 MG: 80 TABLET, FILM COATED ORAL at 20:32

## 2017-10-22 RX ADMIN — TICAGRELOR 90 MG: 90 TABLET ORAL at 08:33

## 2017-10-22 RX ADMIN — MORPHINE SULFATE 4 MG: 2 INJECTION, SOLUTION INTRAMUSCULAR; INTRAVENOUS at 01:42

## 2017-10-22 NOTE — PLAN OF CARE
Problem: Patient Care Overview (Adult)  Goal: Plan of Care Review  Outcome: Ongoing (interventions implemented as appropriate)    Problem: Acute Coronary Syndrome (ACS) (Adult)  Goal: Signs and Symptoms of Listed Potential Problems Will be Absent or Manageable (Acute Coronary Syndrome)  Outcome: Ongoing (interventions implemented as appropriate)    10/22/17 1840   Acute Coronary Syndrome (ACS)   Problems Assessed (Acute Coronary Syndrome (ACS)) all   Problems Present (Acute Coronary Syndrome (ACS)) (no problems seen)         Problem: Cardiac Catheterization with/without PCI (Adult)  Goal: Signs and Symptoms of Listed Potential Problems Will be Absent or Manageable (Cardiac Catheterization with/without PCI)  Outcome: Ongoing (interventions implemented as appropriate)

## 2017-10-22 NOTE — PLAN OF CARE
Problem: Acute Coronary Syndrome (ACS) (Adult)  Goal: Signs and Symptoms of Listed Potential Problems Will be Absent or Manageable (Acute Coronary Syndrome)    10/22/17 0052   Acute Coronary Syndrome (ACS)   Problems Assessed (Acute Coronary Syndrome (ACS)) all   Problems Present (Acute Coronary Syndrome (ACS)) chest pain (angina);ischemia leading to infarction

## 2017-10-22 NOTE — H&P
[facility]   HISTORY AND PHYSICAL      Name:  Ba Singh   Age:  82 y.o.  Sex:  male  :  1935  MRN:  [id number]   Visit Number:  [id number]  Admission Date:  10/21/2017  Date Of Service:  10/21/17  Primary Care Physician:  No Known Provider    Admitting Diagnosis:  1. NSTEMI (non-ST elevated myocardial infarction)        History Of Presenting Illness:      Patient is an 81 yo male with a pmhx of Hypothyroidism, PUD, HTN, Pacemaker from bradycardia who earlier today was changing his tire and developed exertional chest pain, substernal with associated soa and diaphoresis.  He reported the chest pain as a constant heavy pain as if an elephant was sitting on him.  He had 3 episodes of n/v, non bloody, non billious.  Upon admission to the ED his troponin I was elevated at 0.13 with EKG showing st depressions in leads 1, 2,3, AVF, V3-V6.  Patient has received ASA. Dr. Hicks was consulted and recommended IV heparin and nitro drip.  Patients chest pain intensity was initially at 10/10, which improved down to 4/10 with nitro and currently is at 1/10 with a nitro drip.  Will keep patient NPO after midnight with IV fluids.  + family for CAD, negative tobacco abuse.     Review Of Systems:      The following systems were reviewed and negative;  constitution, eyes, ENT, respiratory, cardiovascular, gastrointestinal, genitourinary, musculoskeletal, neurological and behavioral/psych, skin except as above.     Past Medical History:    Past Medical History:   Diagnosis Date   • Bradycardia    • Disease of thyroid gland    • History of stomach ulcers    • Hypertension        Past Surgical history:    Past Surgical History:   Procedure Laterality Date   • ORTHOPEDIC SURGERY     • PACEMAKER IMPLANTATION         Social History:    Social History     Social History   • Marital status:      Spouse name: N/A   • Number of children: N/A   • Years of education: N/A     Social History  Main Topics   • Smoking status: Never Smoker   • Smokeless tobacco: None   • Alcohol use No   • Drug use: No   • Sexual activity: Not Asked     Other Topics Concern   • None     Social History Narrative   • None       Family History:    History reviewed. No pertinent family history.        Allergies:      Review of patient's allergies indicates no known allergies.    Home Medications:    Prior to Admission Medications     Prescriptions Last Dose Informant Patient Reported? Taking?    losartan (COZAAR) 50 MG tablet   Yes No    Take 100 mg by mouth Daily.              Vital Signs:    Temp:  [97.1 °F (36.2 °C)-97.2 °F (36.2 °C)] 97.2 °F (36.2 °C)  Heart Rate:  [60] 60  Resp:  [18] 18  BP: (123-150)/(48-85) 143/65    Last 3 weights    10/21/17  1818   Weight: 145 lb (65.8 kg)       Body mass index is 23.4 kg/(m^2).    Physical Exam:      General Appearance:    Alert and cooperative, not in any acute distress.   Head:    Atraumatic and normocephalic, without obvious abnormality.   Eyes:            PERRLA, conjunctivae and sclerae normal, no Icterus. No pallor. Extra-occular movements are within normal limits.   Ears:    Ears appear intact with no abnormalities noted.   Throat:   No oral lesions, no thrush, oral mucosa moist.   Neck:   Supple, trachea midline, no carotid bruit.   Back:     No tenderness to palpation, range of motion normal.   Lungs:     Chest shape is normal. Breath sounds heard bilaterally equally.  No crackles or wheezing.    Heart:    Normal S1 and S2, no murmur, no gallop, no rub.   Abdomen:     Normal bowel sounds,  Soft non-tender, non-distended, no guarding, no rebound tenderness   Extremities:   Moves all extremities well, no edema      Skin:   No bruising , no rashes   Neurologic:   Cranial nerves 2 - 12 grossly intact, sensation intact, Motor power is normal and equal bilaterally.       EKG:  ST depressions in leads 1, 2, 3, AVF, V3-V6  Echo: Pending    Labs:      Results from last 7 days  Lab  Units 10/21/17  1825   WBC 10*3/mm3 16.02*   HEMOGLOBIN g/dL 12.4*   HEMATOCRIT % 36.8*   PLATELETS 10*3/mm3 261       Results from last 7 days  Lab Units 10/21/17  1824   SODIUM mmol/L 140   POTASSIUM mmol/L 4.2   CHLORIDE mmol/L 103   CO2 mmol/L 24.0*   BUN mg/dL 17   CREATININE mg/dL 1.50*   CALCIUM mg/dL 8.3*   BILIRUBIN mg/dL 0.3   ALK PHOS U/L 109   ALT (SGPT) U/L 36   AST (SGOT) U/L 26   GLUCOSE mg/dL 212*         Results from last 7 days  Lab Units 10/21/17  1824   INR  1.03       Lab Results  Lab Value Date/Time   PTT 25.1 10/21/2017 1824               Results from last 7 days  Lab Units 10/21/17  1824   TROPONIN I ng/mL 0.111*  0.130*         Results from last 7 days  Lab Units 10/21/17  1824   INR  1.03   APTT seconds 25.1             Radiology:    Imaging Results (last 72 hours)     Procedure Component Value Units Date/Time    XR Chest 2 View [[id number]] Updated:  10/21/17 1934          Results Review: I have personally reviewed laboratory data, culture results, radiology studies and antimicrobial therapy.    Assessment:      -Chest pain  -HTN  -peptic ulcer disease  -Pacemaker for hx of bradycardia  -CKD stage III    Plan:     -Initiate ASA, IV Heparin/Nitro.  NPO past midnight, with IV fluids.  Cycle cardiac markers with echo ordered. Patient with elevated white count but no symptoms of underlying infection, productive cough, dysuria, diarrhea, or f/c/s and is likely stress reactant.  Repeat CBC for am. Creatinine is currently at 1.5 and at baseline.     Allen Pinzon MD  10/21/17  8:51 PM

## 2017-10-22 NOTE — ED NOTES
House Supervisor called about bed placement for admit. Patient will be going to ICU 3. She stated that they were cleaning the room and as soon as they were done they would let us know.     Radha Ann  10/21/17 2038

## 2017-10-22 NOTE — CONSULTS
[facility]-Cardiology Consult Note    Referring Provider: Jeromy  Reason for Consultation: Chest pain    Patient Care Team:  No Known Provider as PCP - General    Chief complaint : Chest pain    Subjective .     History of present illness:  This is a 82-year-old male patient with no prior history of documented heart disease who presents to the emergency room with onset of chest discomfort at 4 PM while working to change the tire on his vehicle.  The patient describes the discomfort as a pressure sensation diffusely across his central chest with radiation into his left arm.  It was associated with shortness of breath nausea and diaphoresis.  On arrival here the patient was having 6/10 chest intensity which had improved after paramedics had administered sublingual nitroglycerin.  His chest discomfort resolved completely with IV heparin and IV nitroglycerin.  His initial 12-lead electrocardiogram showed normal sinus rhythm with lateral ST segment depression consistent with ischemia versus left ventricular hypertrophy with a strain pattern.  There were no old EKGs for comparison.  The patient's initial troponin was elevated at 0.13.  The patient denies having previous chest discomfort of this quality.  He had no antiseizure exertional chest arm neck jaw shoulder or back discomfort.  He has no orthopnea PND or lower extremity edema.  He has no dizziness palpitations or syncope.  The patient had a dual-chamber rate responsive pacemaker placed at [facility] in 2010 for sinus node dysfunction.  The patient has no prior history of myocardial infarction or coronary revascularization.  On presentation the patient's systolic blood pressure was greater than 220 mmHg.  He does not take blood pressure medication at home.  He has no history of documented diabetes but his blood sugar here was over 200.  His cholesterol status is unknown but he does not take cholesterol lowering medication.  He is a lifelong nonsmoker.  His  family history is negative for premature coronary disease.    Review of Systems   Review of Systems   Constitution: Positive for diaphoresis. Negative for chills, fever, weakness, malaise/fatigue, night sweats, weight gain and weight loss.   HENT: Negative for ear discharge, hearing loss, hoarse voice and nosebleeds.    Eyes: Negative for discharge, double vision, pain and photophobia.   Cardiovascular: Positive for chest pain. Negative for claudication, cyanosis, dyspnea on exertion, irregular heartbeat, leg swelling, near-syncope, orthopnea, palpitations, paroxysmal nocturnal dyspnea and syncope.   Respiratory: Positive for shortness of breath. Negative for cough, hemoptysis, sputum production and wheezing.    Endocrine: Negative for cold intolerance, heat intolerance, polydipsia, polyphagia and polyuria.   Hematologic/Lymphatic: Negative for adenopathy and bleeding problem. Does not bruise/bleed easily.   Skin: Negative for color change, flushing, itching and rash.   Musculoskeletal: Negative for muscle cramps, muscle weakness, myalgias and stiffness.   Gastrointestinal: Positive for nausea. Negative for abdominal pain, diarrhea, hematemesis, hematochezia and vomiting.   Genitourinary: Negative for dysuria, frequency and nocturia.   Neurological: Negative for focal weakness, loss of balance, numbness, paresthesias and seizures.   Psychiatric/Behavioral: Negative for altered mental status, hallucinations and suicidal ideas.   Allergic/Immunologic: Negative for HIV exposure, hives and persistent infections.       History  Past Medical History:   Diagnosis Date   • Bradycardia    • Disease of thyroid gland    • History of stomach ulcers    • Hypertension    , Past Surgical History:   Procedure Laterality Date   • ORTHOPEDIC SURGERY     • PACEMAKER IMPLANTATION     , History reviewed. No pertinent family history., Social History   Substance Use Topics   • Smoking status: Never Smoker   • Smokeless tobacco: None   •  "Alcohol use No   ,   (Not in a hospital admission) and Allergies:  Review of patient's allergies indicates no known allergies.    Objective     Vital Sign Min/Max for last 24 hours  Temp  Min: 97.1 °F (36.2 °C)  Max: 97.1 °F (36.2 °C)   BP  Min: 123/63  Max: 150/85   Pulse  Min: 60  Max: 60   Resp  Min: 18  Max: 18   SpO2  Min: 96 %  Max: 100 %   Flow (L/min)  Min: 2  Max: 2   Weight  Min: 145 lb (65.8 kg)  Max: 145 lb (65.8 kg)     Flowsheet Rows         First Filed Value    Admission Height  66\" (167.6 cm) Documented at 10/21/2017 1818    Admission Weight  145 lb (65.8 kg) Documented at 10/21/2017 1818             Physical Exam:   Physical Exam   Constitutional: He is oriented to person, place, and time. He appears well-developed and well-nourished.   HENT:   Head: Normocephalic and atraumatic.   Mouth/Throat: Oropharynx is clear and moist.   Eyes: Conjunctivae and EOM are normal. Pupils are equal, round, and reactive to light. No scleral icterus.   Neck: Normal range of motion. Neck supple. No JVD present. No tracheal deviation present. No thyromegaly present.   Cardiovascular: Normal rate, regular rhythm, S1 normal, S2 normal, normal heart sounds, intact distal pulses and normal pulses.  PMI is not displaced.  Exam reveals no gallop and no friction rub.    No murmur heard.  Pulmonary/Chest: Effort normal and breath sounds normal. No respiratory distress. He has no wheezes. He has no rales.   Abdominal: Soft. Bowel sounds are normal. He exhibits no distension and no mass. There is no tenderness. There is no rebound and no guarding.   Musculoskeletal: Normal range of motion. He exhibits no edema or deformity.   Neurological: He is alert and oriented to person, place, and time. He displays normal reflexes. No cranial nerve deficit. Coordination normal.   Skin: Skin is warm and dry. No rash noted. No erythema.   Psychiatric: He has a normal mood and affect. His behavior is normal. Thought content normal. "       Results Review:   I reviewed the patient's new clinical results.    Results from last 7 days  Lab Units 10/21/17  1825   WBC 10*3/mm3 16.02*   HEMOGLOBIN g/dL 12.4*   HEMATOCRIT % 36.8*   PLATELETS 10*3/mm3 261       Results from last 7 days  Lab Units 10/21/17  1824   SODIUM mmol/L 140   POTASSIUM mmol/L 4.2   CHLORIDE mmol/L 103   CO2 mmol/L 24.0*   BUN mg/dL 17   CREATININE mg/dL 1.50*   GLUCOSE mg/dL 212*   CALCIUM mg/dL 8.3*      TROPONINT: 0.13, 0.11          Assessment/Plan     Active Problems:    NSTEMI (non-ST elevated myocardial infarction)- the patient began having chest discomfort at 4 PM.  The discomfort has escalated in frequency and intensity prompting an emergency room visit via 911.  The patient's initial EKG showed lateral inferior ST segment depression which was downsloping.  His initial troponin was elevated at 0.13.  Repeat troponin was 0.11.  The patient is now chest discomfort free after IV heparin and nitroglycerin.  He has multiple risk factors for coronary atherosclerosis.    Sinus node dysfunction.  The patient has a normally functioning dual-chamber rate responsive pacemaker that is followed by Dr. Duarte.    Malignant hypertension.  The patient's blood pressure on admission was greater than 220 mmHg systolic.  He does not take blood pressure medication at home.    Grade 3 chronic renal insufficiency.      I have discussed treatment options with the patient including cardiac catheterization with interventional standby.  The patient's YASH risk score is 6 putting him in a high risk category.  I have explained to the patient the increased risk given his age and chronic renal insufficiency.  The patient and his wife express understanding and a desire to proceed.  As the patient is now hemodynamically stable and chest discomfort free we will proceed with coronary angiography with interventional standby in the morning.  In the meantime he will be treated with standard antianginal  therapy.  He will be given IV fluid hydration throughout the course of the night for kidney protection.    I discussed the patients findings and my recommendations with patient, family and nursing staff    Siddhartha Hicks MD  10/21/17  8:43 PM

## 2017-10-23 LAB
ANION GAP SERPL CALCULATED.3IONS-SCNC: 11.4 MMOL/L
BH CV ECHO MEAS - BSA(HAYCOCK): 1.7 M^2
BH CV ECHO MEAS - BSA: 1.7 M^2
BH CV ECHO MEAS - BZI_BMI: 22.6 KILOGRAMS/M^2
BH CV ECHO MEAS - BZI_METRIC_HEIGHT: 167.6 CM
BH CV ECHO MEAS - BZI_METRIC_WEIGHT: 63.5 KG
BUN BLD-MCNC: 16 MG/DL (ref 7–20)
BUN/CREAT SERPL: 12.3 (ref 6.3–21.9)
CALCIUM SPEC-SCNC: 8.2 MG/DL (ref 8.4–10.2)
CHLORIDE SERPL-SCNC: 112 MMOL/L (ref 98–107)
CHOLEST SERPL-MCNC: 152 MG/DL (ref 0–199)
CO2 SERPL-SCNC: 24 MMOL/L (ref 26–30)
CREAT BLD-MCNC: 1.3 MG/DL (ref 0.6–1.3)
DEPRECATED RDW RBC AUTO: 40.1 FL (ref 37–54)
ERYTHROCYTE [DISTWIDTH] IN BLOOD BY AUTOMATED COUNT: 11.9 % (ref 11.5–14.5)
GFR SERPL CREATININE-BSD FRML MDRD: 53 ML/MIN/1.73
GLUCOSE BLD-MCNC: 142 MG/DL (ref 74–98)
HBA1C MFR BLD: 7.9 % (ref 3–6)
HCT VFR BLD AUTO: 31.7 % (ref 42–52)
HDLC SERPL-MCNC: 43 MG/DL (ref 40–60)
HGB BLD-MCNC: 10.6 G/DL (ref 14–18)
LDLC SERPL CALC-MCNC: 93 MG/DL (ref 0–99)
LDLC/HDLC SERPL: 2.16 {RATIO}
LV EF 2D ECHO EST: 56 %
MCH RBC QN AUTO: 30.7 PG (ref 27–31)
MCHC RBC AUTO-ENTMCNC: 33.4 G/DL (ref 30–37)
MCV RBC AUTO: 91.9 FL (ref 80–94)
PLATELET # BLD AUTO: 195 10*3/MM3 (ref 130–400)
PMV BLD AUTO: 12.3 FL (ref 6–12)
POTASSIUM BLD-SCNC: 4.4 MMOL/L (ref 3.5–5.1)
RBC # BLD AUTO: 3.45 10*6/MM3 (ref 4.7–6.1)
SODIUM BLD-SCNC: 143 MMOL/L (ref 137–145)
TRIGL SERPL-MCNC: 80 MG/DL
VLDLC SERPL-MCNC: 16 MG/DL
WBC NRBC COR # BLD: 7.43 10*3/MM3 (ref 4.8–10.8)

## 2017-10-23 PROCEDURE — 85027 COMPLETE CBC AUTOMATED: CPT | Performed by: INTERNAL MEDICINE

## 2017-10-23 PROCEDURE — 99231 SBSQ HOSP IP/OBS SF/LOW 25: CPT | Performed by: INTERNAL MEDICINE

## 2017-10-23 PROCEDURE — 80048 BASIC METABOLIC PNL TOTAL CA: CPT | Performed by: INTERNAL MEDICINE

## 2017-10-23 PROCEDURE — 83036 HEMOGLOBIN GLYCOSYLATED A1C: CPT | Performed by: INTERNAL MEDICINE

## 2017-10-23 PROCEDURE — 94799 UNLISTED PULMONARY SVC/PX: CPT | Performed by: NURSE PRACTITIONER

## 2017-10-23 PROCEDURE — 99232 SBSQ HOSP IP/OBS MODERATE 35: CPT | Performed by: INTERNAL MEDICINE

## 2017-10-23 PROCEDURE — 80061 LIPID PANEL: CPT | Performed by: INTERNAL MEDICINE

## 2017-10-23 PROCEDURE — 93005 ELECTROCARDIOGRAM TRACING: CPT | Performed by: INTERNAL MEDICINE

## 2017-10-23 RX ORDER — LOSARTAN POTASSIUM 50 MG/1
100 TABLET ORAL DAILY
Status: CANCELLED | OUTPATIENT
Start: 2017-10-23

## 2017-10-23 RX ADMIN — LOSARTAN POTASSIUM 50 MG: 50 TABLET, FILM COATED ORAL at 08:05

## 2017-10-23 RX ADMIN — PANTOPRAZOLE SODIUM 40 MG: 40 TABLET, DELAYED RELEASE ORAL at 06:13

## 2017-10-23 RX ADMIN — Medication 20 ML: at 04:17

## 2017-10-23 RX ADMIN — CARVEDILOL 25 MG: 25 TABLET, FILM COATED ORAL at 08:05

## 2017-10-23 RX ADMIN — DOCUSATE SODIUM AND SENNOSIDES 2 TABLET: 8.6; 5 TABLET, FILM COATED ORAL at 20:39

## 2017-10-23 RX ADMIN — DOCUSATE SODIUM 100 MG: 100 CAPSULE, LIQUID FILLED ORAL at 20:40

## 2017-10-23 RX ADMIN — ASPIRIN 81 MG 81 MG: 81 TABLET ORAL at 08:05

## 2017-10-23 RX ADMIN — CARVEDILOL 25 MG: 25 TABLET, FILM COATED ORAL at 20:39

## 2017-10-23 RX ADMIN — TICAGRELOR 90 MG: 90 TABLET ORAL at 08:05

## 2017-10-23 RX ADMIN — ATORVASTATIN CALCIUM 80 MG: 80 TABLET, FILM COATED ORAL at 20:40

## 2017-10-23 RX ADMIN — TICAGRELOR 90 MG: 90 TABLET ORAL at 20:39

## 2017-10-23 RX ADMIN — AMLODIPINE BESYLATE 5 MG: 5 TABLET ORAL at 08:05

## 2017-10-23 NOTE — PROGRESS NOTES
[facility]    Name:  Ba Singh   Age:  82 y.o.  Sex:  male  :  1935  MRN:  [id number]   Visit Number:  [id number]  Admission Date:  10/21/2017  Date Of Service:  10/23/17  Primary Care Physician:  No Known Provider     LOS: 2 days :  Patient Care Team:  No Known Provider as PCP - General:    History taken from:     patient    Chief Complaint:      Non-ST elevation MI  Subjective     Interval History:     Patient seen today.  Patient was admitted for non-ST elevation MI.  He was taken for left heart catheterization by Dr. Hicks yesterday with a stent placed in the circumflex.  He also has a pacemaker for history of sick sinus syndrome.  Today he denies any chest pressure, shortness breath, nausea, vomiting, or pain.  Reviewed history and physical, consults, procedures, lab work, and x-rays regarding this case.  Spoke with cardiology, patient is okay to be transferred to the floor.  Patient has no other complaints.    Review of Systems:     All systems were reviewed and negative except for:  Cardiovascular: positive for  chest pressure / pain, on exertion    Objective     Vital Signs:    Temp:  [98 °F (36.7 °C)-98.9 °F (37.2 °C)] 98.3 °F (36.8 °C)  Heart Rate:  [60-73] 62  Resp:  [10-19] 16  BP: (105-156)/(35-99) 134/99    Physical Exam:      General Appearance:    Alert, cooperative, in no acute distress   Head:    Normocephalic, without obvious abnormality, atraumatic   Eyes:            Lids and lashes normal, conjunctivae and sclerae normal, no   icterus, no pallor, corneas clear, PERRLA   Ears:    Ears appear intact with no abnormalities noted   Throat:   No oral lesions, no thrush, oral mucosa moist   Neck:   No adenopathy, supple, trachea midline, no thyromegaly, no   carotid bruit, no JVD   Back:     No kyphosis present, no scoliosis present, no skin lesions,      erythema or scars, no tenderness to percussion or                    palpation,   range of motion normal   Lungs:     Clear to auscultation,respirations regular, even and                  unlabored    Heart:    Regular rhythm and normal rate, normal S1 and S2, no            murmur, no gallop, no rub, no click   Chest Wall:    No abnormalities observed   Abdomen:     Normal bowel sounds, no masses, no organomegaly, soft        non-tender, non-distended, no guarding, no rebound                tenderness   Rectal:     Deferred   Extremities:   Moves all extremities well, no edema, no cyanosis, no             redness   Pulses:   Pulses palpable and equal bilaterally   Skin:   No bleeding, bruising or rash   Lymph nodes:   No palpable adenopathy   Neurologic:   Cranial nerves 2 - 12 grossly intact, sensation intact, DTR       present and equal bilaterally          Results Review:      I reviewed the patient's new clinical results.    Labs:  Lab Results (last 24 hours)     Procedure Component Value Units Date/Time    POC Activated Clotting Time [[id number]]  (Abnormal) Collected:  10/22/17 1142    Specimen:  Blood Updated:  10/22/17 1236     Activated Clotting Time  233 (H) Seconds       Serial Number: [id number]:  [id number]       Hemoglobin A1c [[id number]] Collected:  10/23/17 0416    Specimen:  Blood Updated:  10/23/17 0505    CBC (No Diff) [[id number]]  (Abnormal) Collected:  10/23/17 0416    Specimen:  Blood Updated:  10/23/17 0513     WBC 7.43 10*3/mm3      RBC 3.45 (L) 10*6/mm3      Hemoglobin 10.6 (L) g/dL      Hematocrit 31.7 (L) %      MCV 91.9 fL      MCH 30.7 pg      MCHC 33.4 g/dL      RDW 11.9 %      RDW-SD 40.1 fl      MPV 12.3 (H) fL      Platelets 195 10*3/mm3     Basic Metabolic Panel [[id number]]  (Abnormal) Collected:  10/23/17 0415    Specimen:  Blood Updated:  10/23/17 0529     Glucose 142 (H) mg/dL      BUN 16 mg/dL      Creatinine 1.30 mg/dL      Sodium 143 mmol/L      Potassium 4.4 mmol/L      Chloride 112 (H) mmol/L      CO2 24.0 (L) mmol/L      Calcium 8.2 (L) mg/dL       eGFR Non African Amer 53 (L) mL/min/1.73      BUN/Creatinine Ratio 12.3     Anion Gap 11.4 mmol/L     Narrative:       The MDRD GFR formula is only valid for adults with stable renal function between ages 18 and 70.    Lipid Panel [[id number]] Collected:  10/23/17 0415    Specimen:  Blood Updated:  10/23/17 0529     Total Cholesterol 152 mg/dL      Triglycerides 80 mg/dL      HDL Cholesterol 43 mg/dL      LDL Cholesterol  93 mg/dL      VLDL Cholesterol 16 mg/dL      LDL/HDL Ratio 2.16    Narrative:       Reference ranges for triglycerides, VLDL cholesterol, LDL cholesterol, and HDL cholesterol are not true population normal ranges but are threshold levels for increased risk of coronary artery disease established by ATP III guidelines from the National Cholesterol Education Program.           Radiology:    Imaging Results (last 24 hours)     ** No results found for the last 24 hours. **          Medication Review:       amLODIPine 5 mg Oral Daily   aspirin 81 mg Oral Daily   atorvastatin 80 mg Oral Nightly   carvedilol 25 mg Oral BID   docusate sodium 100 mg Oral BID   losartan 50 mg Oral Daily   pantoprazole 40 mg Oral Q AM   sennosides-docusate sodium 2 tablet Oral Nightly   ticagrelor 90 mg Oral BID         niCARdipine 5-15 mg/hr Last Rate: 5 mg/hr (10/22/17 0843)   nitroglycerin 5-200 mcg/min Last Rate: 20 mcg/min (10/22/17 0730)       Assessment/Plan     Problem List Items Addressed This Visit     NSTEMI (non-ST elevated myocardial infarction) - Primary    Relevant Orders    Case Request Cath Lab: Coronary angiography (Completed)    Cardiac Catheterization/Vascular Study (Completed)          1.  Non-ST elevation MI status post stent to the circumflex  2.  Hypertension  3.  Chronic kidney disease stage III  4.  History of pacemaker placement for sick sinus syndrome    Plan:    We'll transfer to the telemetry unit.  Discontinue nicardipine and nitroglycerin.  Check lab work in the a.m.  Hopefully patient can  be discharged home tomorrow.  Continue the patient on aspirin, Lipitor, Coreg, valsartan, and Btilinta.    Sixto Gtz,   10/23/17  10:34 AM

## 2017-10-23 NOTE — NURSING NOTE
Got report from BOBBI Sheridan in ICU.  Pt stable and moved to room 328.  All needs met and educated patient in call light system.

## 2017-10-23 NOTE — PROGRESS NOTES
"Adult Nutrition  Assessment/PES    Patient Name:  Ba Singh  YOB: 1935  MRN: [id number]  Admit Date:  10/21/2017    Assessment Date:  10/23/2017    Comments:  Rec. #1: Continue with current diet order. Pt. Consuming 100% of meals on average per NSG. Rec. #2: Consider adding Renal MVI once medically feasible. Consult RD PRN.           Reason for Assessment       10/23/17 1102    Reason for Assessment    Reason For Assessment/Visit admission assessment    Identified At Risk By Screening Criteria diagnosis    Diagnosis Diagnosis    Cardiac HTN;Other (comment)   CP; Pacemaker    Gastrointestinal PUD    Renal CKD    Factors Affecting Nutrition Factors    Appetite Good                  Labs/Tests/Procedures/Meds       10/23/17 1106    Labs/Tests/Procedures/Meds    Labs/Tests Review Reviewed;Glucose;Cl-;Hgb Hct   High: Gluc, Cl    Medication Review Reviewed, pertinent;Antacid              Estimated/Assessed Needs       10/23/17 1106    Calculation Measurements    Weight Used For Calculations 65.7 kg (144 lb 13.5 oz)    Height Used for Calculations 1.676 m (5' 6\")    Estimated/Assessed Energy Needs    Energy Need Method [place]    Age 82    RMR (Inn[employer] Equation) 1309.75    Activity Factors (Innography [employer])  Out of bed, ambulatory  1.3    Estimated Kcal Range  ~3902-9768    Estimated/Assessed Protein Needs    Weight Used for Protein Calculation 65.7 kg (144 lb 13.5 oz)    Protein (gm/kg) 0.6    0.6 Gm Protein (gm) 39.42    Estimated Protein Range ~39.42-49.3    Estimated/Assessed Fluid Needs    Fluid Need Method --   output + 1000 ml            Nutrition Prescription Ordered       10/23/17 1108    Nutrition Prescription PO    Current PO Diet Regular    Common Modifiers Consistent Carbohydrate;Cardiac            Evaluation of Received Nutrient/Fluid Intake       10/23/17 1109    PO Evaluation    Number of Days PO Intake Evaluated 1 day    Number of Meals 1    % PO Intake 100    "         Problem/Interventions:        Problem 1       10/23/17 1109    Nutrition Diagnoses Problem 1    Problem 1 Altered Nutrition Related to Labs    Etiology (related to) Medical Diagnosis    Signs/Symptoms (evidenced by) Biochemical    Specific Labs Noted Chloride                    Intervention Goal       10/23/17 1109    Intervention Goal    General Meet nutritional needs for age/condition    PO Meet estimated needs;PO intake (%)    PO Intake % 75 %    Weight No significant weight loss            Nutrition Intervention       10/23/17 1110    Nutrition Intervention    RD/Tech Action Follow Tx progress            Nutrition Prescription       10/23/17 1110    Nutrition Prescription PO    PO Prescription --   Continue with current diet order.    Other Orders    Obtain Weight Daily    Obtain Weight Ordered? No, recommended            Education/Evaluation       10/23/17 1110    Education    Education Will Instruct as appropriate    Monitor/Evaluation    Monitor Per protocol;PO intake;Pertinent labs;Weight        Electronically signed by:  Cassie Crespo RD  10/23/17 11:11 AM

## 2017-10-23 NOTE — PROGRESS NOTES
"[facility]-Cardiology Progress note     LOS: 2 days   Patient Care Team:  No Known Provider as PCP - General    Chief Complaint:  Chest Pain    Subjective     Interval History:     Patient Complaints: none  Patient Denies:  Chest pain, shortness of breath, palpitations, dizziness, edema, orthopnea, fevers chills night sweats, nausea vomiting diarrhea  History taken from: patient    Review of Systems:   All systems were reviewed and negative     Objective     Vital Sign Min/Max for last 24 hours  Temp  Min: 98 °F (36.7 °C)  Max: 98.9 °F (37.2 °C)   BP  Min: 105/50  Max: 156/67   Pulse  Min: 60  Max: 73   Resp  Min: 10  Max: 19   SpO2  Min: 92 %  Max: 100 %   Flow (L/min)  Min: 2  Max: 2   Weight  Min: 144 lb 12.8 oz (65.7 kg)  Max: 144 lb 12.8 oz (65.7 kg)     Flowsheet Rows         First Filed Value    Admission Height  66\" (167.6 cm) Documented at 10/21/2017 1818    Admission Weight  145 lb (65.8 kg) Documented at 10/21/2017 1818          Physical Exam:     General Appearance:    Alert, cooperative, in no acute distress   Head:    Normocephalic, without obvious abnormality, atraumatic   Eyes:            Lids and lashes normal, conjunctivae and sclerae normal, no   icterus, no pallor, corneas clear, PERRLA   Ears:    Ears appear intact with no abnormalities noted   Throat:   No oral lesions, no thrush, oral mucosa moist   Neck:   No adenopathy, supple, trachea midline, no thyromegaly, no   carotid bruit, no JVD   Back:     No kyphosis present, no scoliosis present, no skin lesions,      erythema or scars, no tenderness to percussion or                   palpation,   range of motion normal   Lungs:     Clear to auscultation,respirations regular, even and                  unlabored    Heart:    Regular rhythm and normal rate, normal S1 and S2, no            murmur, no gallop, no rub, no click   Chest Wall:    No abnormalities observed   Abdomen:     Normal bowel sounds, no masses, no organomegaly, soft        non-tender, " non-distended, no guarding, no rebound                tenderness   Rectal:     Deferred   Extremities:   Moves all extremities well, no edema, no cyanosis, no             redness   Pulses:   Pulses palpable and equal bilaterally   Skin:   No bleeding, bruising or rash   Lymph nodes:   No palpable adenopathy   Neurologic:   Cranial nerves 2 - 12 grossly intact, sensation intact, DTR       present and equal bilaterally          Results Review:     I reviewed the patient's new clinical results.      Results from last 7 days  Lab Units 10/23/17  0415   SODIUM mmol/L 143   POTASSIUM mmol/L 4.4   CHLORIDE mmol/L 112*   CO2 mmol/L 24.0*   BUN mg/dL 16   CREATININE mg/dL 1.30   GLUCOSE mg/dL 142*   CALCIUM mg/dL 8.2*       Results from last 7 days  Lab Units 10/23/17  0416 10/22/17  0416 10/21/17  1825   WBC 10*3/mm3 7.43 10.65 16.02*   HEMOGLOBIN g/dL 10.6* 12.5* 12.4*   HEMATOCRIT % 31.7* 37.2* 36.8*   PLATELETS 10*3/mm3 195 225 261       Echo EF Estimated  Lab Results   Component Value Date    ECHOEFEST 56 10/22/2017       Physical Exam    Medication Review: yes    Assessment/Plan     Active Problems:    NSTEMI (non-ST elevated myocardial infarction)    Essential hypertension    Bradycardia    Doing well status post stenting of the infarct related artery (proximal circumflex).  From my standpoint the patient may transition to the telemetry unit.  I anticipate discharge to home in the morning.    Plan for disposition:Where: home and When:  tomorrow    Siddhartha Hicks MD  10/23/17  10:50 AM

## 2017-10-23 NOTE — PLAN OF CARE
Problem: Patient Care Overview (Adult)  Goal: Plan of Care Review  Outcome: Ongoing (interventions implemented as appropriate)    10/23/17 0545   Coping/Psychosocial Response Interventions   Plan Of Care Reviewed With patient   Patient Care Overview   Progress improving         10/23/17 0545   Coping/Psychosocial Response Interventions   Plan Of Care Reviewed With patient   Patient Care Overview   Progress improving   Outcome Evaluation   Outcome Summary/Follow up Plan no c/o pain overnight.         Problem: Acute Coronary Syndrome (ACS) (Adult)  Goal: Signs and Symptoms of Listed Potential Problems Will be Absent or Manageable (Acute Coronary Syndrome)  Outcome: Ongoing (interventions implemented as appropriate)    Problem: Cardiac Catheterization with/without PCI (Adult)  Goal: Signs and Symptoms of Listed Potential Problems Will be Absent or Manageable (Cardiac Catheterization with/without PCI)  Outcome: Ongoing (interventions implemented as appropriate)

## 2017-10-23 NOTE — NURSING NOTE
Pt seen today for Cardiac Rehab consult.  Pt has no further questions at this time.  Appointment letter given.

## 2017-10-23 NOTE — PLAN OF CARE
Problem: Patient Care Overview (Adult)  Goal: Plan of Care Review  Outcome: Ongoing (interventions implemented as appropriate)    10/23/17 2643   Coping/Psychosocial Response Interventions   Plan Of Care Reviewed With patient;spouse   Outcome Evaluation   Outcome Summary/Follow up Plan Spoke with Ba and his wife at this bedside in ICU. He was alert, conversational and has a positive outlook on his recovery. 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 His nara is very significant to him, and he is active in his home Sikhism. I offered prayer at his invitation and will continue to visit during his hospitalization.

## 2017-10-23 NOTE — PROGRESS NOTES
Discharge Planning Assessment   [place]     Patient Name: Ba Singh  MRN: [id number]  Today's Date: 10/23/2017    Admit Date: 10/21/2017          Discharge Needs Assessment       10/23/17 1251    Living Environment    Provides Primary Care For no one, unable/limited ability to care for self    Primary Care Provided By spouse/significant other    Caregiving Concerns Lives with wife    Quality Of Family Relationships supportive    Able to Return to Prior Living Arrangements yes    Discharge Needs Assessment    Concerns To Be Addressed discharge planning concerns    Readmission Within The Last 30 Days no previous admission in last 30 days    Anticipated Changes Related to Illness none    Equipment Needed After Discharge none    Discharge Disposition home or self-care    Discharge Planning Comments Home with wife.             Discharge Plan       10/23/17 1254    Case Management/Social Work Plan    Plan SW spoke to patient who lives with his wife. [facility] and he makes his own decisions. [facility] [place] [facility] No home health or O2 at this time. Drives himself. No medicaion issues.  No DME. Has lost hearing aids.  No drug/alcohol issues.  Provided Brilinta coupon and discussed continuing process. He is a  so [facility]  Has a 30 day free coupon.     Patient/Family In Agreement With Plan yes    Final Note    Final Note Following for social and discharge planning.         Discharge Placement     No information found                Demographic Summary       10/23/17 1248    Referral Information    Admission Type inpatient    Referral Source admission list    Reason For Consult discharge planning    Record Reviewed medical record    Primary Care Physician Information    Name [facility] [place].              Functional Status       10/23/17 1249    IADL    Medications independent    Meal Preparation  independent    Housekeeping independent    Laundry independent    Shopping independent    Oral Care independent    Activity Tolerance    Current Activity Limitations bed rest    Employment/Financial    Source Of Income pension/group home    Financial Concerns none            Psychosocial     None            Abuse/Neglect     None            Legal     None            Substance Abuse     None            Patient Forms     None          Yari Reed

## 2017-10-23 NOTE — PROGRESS NOTES
[facility]    Name:  Ba Singh   Age:  82 y.o.  Sex:  male  :  1935  MRN:  [id number]   Visit Number:  [id number]  Admission Date:  10/21/2017  Date Of Service:  10/22/17  Primary Care Physician:  No Known Provider     LOS: 1 day :  Patient Care Team:  No Known Provider as PCP - General:    Chief Complaint:      Chest Pain/CAD/HTN    Subjective / Interval History:     I have reviewed labs/imaging/records from this hospitalization, including ER staff and admitting/attending physicians H/P's and progress notes to establish a comprehensive understanding of this patient's clinical hospital course, as well as to establish a transition of care appropriately.    Pt is an 83 yo male, admitted after presenting to ER with complaints of chest pain; pt describes onset after sig exertion at home changing a tire or automobile; sternal pain, radiating to LUE/neck; assoc nausea and SOA; did not resolve with rest; eval in ER and found to have sig elevated troponin; no previous hx of risk stratification    Pt has sig hx of symptomatic bradycardia no past, s/p pacemaker for this.    Review of Systems:     General ROS: Patient denies any fevers, chills or loss of consciousness.  Respiratory ROS: Denies cough or shortness of breath.  Cardiovascular ROS: Denies chest pain or palpitations. No history of exertional chest pain.  Gastrointestinal ROS: Denies nausea and vomiting. Denies any abdominal pain. No diarrhea.  Neurological ROS: Denies any focal weakness. No loss of consciousness. Denies any numbness. Denies neck pain.  Dermatological ROS: Denies any redness or pruritis.    Vital Signs:    Temp:  [97.3 °F (36.3 °C)-98.1 °F (36.7 °C)] 98 °F (36.7 °C)  Heart Rate:  [60-73] 60  Resp:  [10-18] 16  BP: (102-176)/(42-77) 125/48    Intake and output:    I/O last 3 completed shifts:  In: 3181 [P.O.:630; I.V.:2551]  Out: 1825 [Urine:1825]       Physical  Examination:    General Appearance:  Alert and cooperative, not in any acute distress.   Head:  Atraumatic and normocephalic, without obvious abnormality.  Mu hearing aids.   Eyes:          PERRLA, conjunctivae and sclerae normal, no Icterus. No pallor. Extra-occular movements are within normal limits.   Neck: Supple, trachea midline, no thyromegaly, no carotid bruit.   Lungs:   Chest shape is normal. Breath sounds heard bilaterally equally.  No crackles or wheezing. No Pleural rub or bronchial breathing.   Heart:  Normal S1 and S2, no murmur, no gallop, no rub. No JVD; left subclavian pacemaker.   Abdomen:   Normal bowel sounds, no masses, no organomegaly. Soft       non-tender, non-distended, no guarding, no rebound tenderness.   Extremities: Moves all extremities well, no edema, no cyanosis, no            clubbing.   Skin: No bleeding, bruising or rash.   Neurologic: Awake, alert and oriented times 3. Moves all 4 extremities equally.   Laboratory results:      Results from last 7 days  Lab Units 10/22/17  0416 10/21/17  1824   SODIUM mmol/L 143 140   POTASSIUM mmol/L 3.8 4.2   CHLORIDE mmol/L 108* 103   CO2 mmol/L 25.0* 24.0*   BUN mg/dL 15 17   CREATININE mg/dL 1.20 1.50*   CALCIUM mg/dL 8.5 8.3*   BILIRUBIN mg/dL 0.5 0.3   ALK PHOS U/L 114 109   ALT (SGPT) U/L 41 36   AST (SGOT) U/L 115* 26   GLUCOSE mg/dL 151* 212*       Results from last 7 days  Lab Units 10/22/17  0416 10/21/17  1825   WBC 10*3/mm3 10.65 16.02*   HEMOGLOBIN g/dL 12.5* 12.4*   HEMATOCRIT % 37.2* 36.8*   PLATELETS 10*3/mm3 225 261       Results from last 7 days  Lab Units 10/21/17  1824   INR  1.03       Results from last 7 days  Lab Units 10/22/17  0621 10/22/17  0016 10/21/17  1824   TROPONIN I ng/mL 23.600* 17.200* 0.111*  0.130*               I have reviewed the patient's laboratory results.    Radiology results:    Imaging Results (last 24 hours)     Procedure Component Value Units Date/Time    XR Chest 2 View [[id number]] Collected:   10/22/17 0843     Updated:  10/22/17 0844    Narrative:       2-VIEW CHEST      INDICATION: Chest pain.     FINDINGS: 2 views compared with 07/28/2016. EKG leads overlie the chest.  Left-sided cardiac pacemaker. Heart size is normal. Lung volumes are  diminished. Aortic calcification. No pneumothorax. Trace right basilar  opacity. Degenerative changes are seen in the spine and the shoulders.       Impression:       Diminished lung volume with minimal right basilar opacity  favored to represent atelectasis. Developing infiltrate otherwise  thought less likely.          I have reviewed the patient's radiology reports.    Medication Review:     I have reviewed the patients active and prn medications.         Assessment:  Active Problems:    NSTEMI (non-ST elevated myocardial infarction)        Plan:    Planned LHC today; high probability of sig CAD; will follow clinically; pt gives hx of non-compliance with med regimen in past; pt symptoms of chest pain sig improved.  I have discussed POC in detail with pt today, he verb understanding and agrees.    I have spent a total of 35 mins in direct pt care today.    Iggy Juan,   10/22/17  9:32 PM

## 2017-10-23 NOTE — PLAN OF CARE
Problem: Patient Care Overview (Adult)  Goal: Plan of Care Review  Outcome: Ongoing (interventions implemented as appropriate)    10/23/17 1549   Coping/Psychosocial Response Interventions   Plan Of Care Reviewed With patient;spouse   Patient Care Overview   Progress improving   Outcome Evaluation   Outcome Summary/Follow up Plan Pt moved from ICU to floor today. No changes noted. Will monitor.        Goal: Adult Individualization and Mutuality  Outcome: Ongoing (interventions implemented as appropriate)  Goal: Discharge Needs Assessment  Outcome: Ongoing (interventions implemented as appropriate)    Problem: Acute Coronary Syndrome (ACS) (Adult)  Goal: Signs and Symptoms of Listed Potential Problems Will be Absent or Manageable (Acute Coronary Syndrome)  Outcome: Ongoing (interventions implemented as appropriate)    Problem: Cardiac Catheterization with/without PCI (Adult)  Goal: Signs and Symptoms of Listed Potential Problems Will be Absent or Manageable (Cardiac Catheterization with/without PCI)  Outcome: Ongoing (interventions implemented as appropriate)

## 2017-10-24 VITALS
WEIGHT: 143.6 LBS | HEIGHT: 66 IN | OXYGEN SATURATION: 99 % | HEART RATE: 62 BPM | DIASTOLIC BLOOD PRESSURE: 56 MMHG | SYSTOLIC BLOOD PRESSURE: 164 MMHG | TEMPERATURE: 98 F | RESPIRATION RATE: 20 BRPM | BODY MASS INDEX: 23.08 KG/M2

## 2017-10-24 LAB
ALBUMIN SERPL-MCNC: 3.1 G/DL (ref 3.5–5)
ANION GAP SERPL CALCULATED.3IONS-SCNC: 11 MMOL/L
BUN BLD-MCNC: 18 MG/DL (ref 7–20)
BUN/CREAT SERPL: 13.8 (ref 6.3–21.9)
CALCIUM SPEC-SCNC: 8.6 MG/DL (ref 8.4–10.2)
CHLORIDE SERPL-SCNC: 112 MMOL/L (ref 98–107)
CO2 SERPL-SCNC: 23 MMOL/L (ref 26–30)
CREAT BLD-MCNC: 1.3 MG/DL (ref 0.6–1.3)
DEPRECATED RDW RBC AUTO: 40.3 FL (ref 37–54)
ERYTHROCYTE [DISTWIDTH] IN BLOOD BY AUTOMATED COUNT: 11.9 % (ref 11.5–14.5)
GFR SERPL CREATININE-BSD FRML MDRD: 53 ML/MIN/1.73
GLUCOSE BLD-MCNC: 160 MG/DL (ref 74–98)
HCT VFR BLD AUTO: 31.7 % (ref 42–52)
HGB BLD-MCNC: 10.6 G/DL (ref 14–18)
MAGNESIUM SERPL-MCNC: 1.9 MG/DL (ref 1.6–2.3)
MCH RBC QN AUTO: 30.8 PG (ref 27–31)
MCHC RBC AUTO-ENTMCNC: 33.4 G/DL (ref 30–37)
MCV RBC AUTO: 92.2 FL (ref 80–94)
PHOSPHATE SERPL-MCNC: 2.9 MG/DL (ref 2.5–4.5)
PLATELET # BLD AUTO: 181 10*3/MM3 (ref 130–400)
PMV BLD AUTO: 12.1 FL (ref 6–12)
POTASSIUM BLD-SCNC: 4 MMOL/L (ref 3.5–5.1)
RBC # BLD AUTO: 3.44 10*6/MM3 (ref 4.7–6.1)
SODIUM BLD-SCNC: 142 MMOL/L (ref 137–145)
WBC NRBC COR # BLD: 8.16 10*3/MM3 (ref 4.8–10.8)

## 2017-10-24 PROCEDURE — 99238 HOSP IP/OBS DSCHRG MGMT 30/<: CPT | Performed by: INTERNAL MEDICINE

## 2017-10-24 PROCEDURE — 85027 COMPLETE CBC AUTOMATED: CPT | Performed by: INTERNAL MEDICINE

## 2017-10-24 PROCEDURE — 83735 ASSAY OF MAGNESIUM: CPT | Performed by: INTERNAL MEDICINE

## 2017-10-24 PROCEDURE — 80069 RENAL FUNCTION PANEL: CPT | Performed by: INTERNAL MEDICINE

## 2017-10-24 PROCEDURE — 99232 SBSQ HOSP IP/OBS MODERATE 35: CPT | Performed by: INTERNAL MEDICINE

## 2017-10-24 RX ORDER — AMLODIPINE BESYLATE 5 MG/1
5 TABLET ORAL DAILY
Qty: 30 TABLET | Refills: 0 | Status: ON HOLD | OUTPATIENT
Start: 2017-10-25 | End: 2022-12-14 | Stop reason: SDUPTHER

## 2017-10-24 RX ORDER — PANTOPRAZOLE SODIUM 40 MG/1
40 TABLET, DELAYED RELEASE ORAL DAILY
Qty: 30 TABLET | Refills: 0 | Status: SHIPPED | OUTPATIENT
Start: 2017-10-24 | End: 2018-07-31

## 2017-10-24 RX ORDER — ATORVASTATIN CALCIUM 80 MG/1
80 TABLET, FILM COATED ORAL NIGHTLY
Qty: 30 TABLET | Refills: 0 | Status: SHIPPED | OUTPATIENT
Start: 2017-10-24 | End: 2018-07-31

## 2017-10-24 RX ORDER — CARVEDILOL 25 MG/1
25 TABLET ORAL 2 TIMES DAILY
Qty: 60 TABLET | Refills: 0 | Status: ON HOLD | OUTPATIENT
Start: 2017-10-24 | End: 2022-12-14 | Stop reason: SDUPTHER

## 2017-10-24 RX ORDER — LOSARTAN POTASSIUM 50 MG/1
50 TABLET ORAL DAILY
Qty: 30 TABLET | Refills: 0 | Status: SHIPPED | OUTPATIENT
Start: 2017-10-25 | End: 2018-07-31

## 2017-10-24 RX ORDER — ASPIRIN 81 MG/1
81 TABLET, CHEWABLE ORAL DAILY
Qty: 30 TABLET | Refills: 0 | Status: SHIPPED | OUTPATIENT
Start: 2017-10-25

## 2017-10-24 RX ORDER — NITROGLYCERIN 0.4 MG/1
0.4 TABLET SUBLINGUAL
Qty: 30 TABLET | Refills: 12 | Status: SHIPPED | OUTPATIENT
Start: 2017-10-24

## 2017-10-24 RX ADMIN — PANTOPRAZOLE SODIUM 40 MG: 40 TABLET, DELAYED RELEASE ORAL at 05:57

## 2017-10-24 RX ADMIN — LOSARTAN POTASSIUM 50 MG: 50 TABLET, FILM COATED ORAL at 10:00

## 2017-10-24 RX ADMIN — TICAGRELOR 90 MG: 90 TABLET ORAL at 10:00

## 2017-10-24 RX ADMIN — CARVEDILOL 25 MG: 25 TABLET, FILM COATED ORAL at 10:00

## 2017-10-24 RX ADMIN — ASPIRIN 81 MG 81 MG: 81 TABLET ORAL at 10:00

## 2017-10-24 RX ADMIN — AMLODIPINE BESYLATE 5 MG: 5 TABLET ORAL at 10:00

## 2017-10-24 RX ADMIN — DOCUSATE SODIUM 100 MG: 100 CAPSULE, LIQUID FILLED ORAL at 10:00

## 2017-10-24 NOTE — PLAN OF CARE
Problem: Acute Coronary Syndrome (ACS) (Adult)  Goal: Signs and Symptoms of Listed Potential Problems Will be Absent or Manageable (Acute Coronary Syndrome)  Outcome: Ongoing (interventions implemented as appropriate)    10/24/17 0020   Acute Coronary Syndrome (ACS)   Problems Assessed (Acute Coronary Syndrome (ACS)) cardiovascular structural defects;dysrhythmia/arrhythmia   Problems Present (Acute Coronary Syndrome (ACS)) dysrhythmia/arrhythmia;situational response

## 2017-10-24 NOTE — PROGRESS NOTES
"[facility]-Cardiology Progress note     LOS: 3 days   Patient Care Team:  No Known Provider as PCP - General    Chief Complaint:  Chest pain    Subjective     Interval History:     Patient Complaints: none  Patient Denies:  Chest pain, orthopnea, PND, peripheral edema, palpitations, bright red blood per rectum, dark tarry stools, fevers chills night sweats, nausea vomiting diarrhea  History taken from: patient    Review of Systems:   All systems were reviewed and negative  Objective     Vital Sign Min/Max for last 24 hours  Temp  Min: 97.8 °F (36.6 °C)  Max: 98.4 °F (36.9 °C)   BP  Min: 120/76  Max: 164/56   Pulse  Min: 60  Max: 70   Resp  Min: 20  Max: 20   SpO2  Min: 99 %  Max: 99 %   No Data Recorded   Weight  Min: 143 lb 9.6 oz (65.1 kg)  Max: 143 lb 9.6 oz (65.1 kg)     Flowsheet Rows         First Filed Value    Admission Height  66\" (167.6 cm) Documented at 10/21/2017 1818    Admission Weight  145 lb (65.8 kg) Documented at 10/21/2017 1818          Physical Exam:     General Appearance:    Alert, cooperative, in no acute distress   Head:    Normocephalic, without obvious abnormality, atraumatic   Eyes:            Lids and lashes normal, conjunctivae and sclerae normal, no   icterus, no pallor, corneas clear, PERRLA   Ears:    Ears appear intact with no abnormalities noted   Throat:   No oral lesions, no thrush, oral mucosa moist   Neck:   No adenopathy, supple, trachea midline, no thyromegaly, no   carotid bruit, no JVD   Back:     No kyphosis present, no scoliosis present, no skin lesions,      erythema or scars, no tenderness to percussion or                   palpation,   range of motion normal   Lungs:     Clear to auscultation,respirations regular, even and                  unlabored    Heart:    Regular rhythm and normal rate, normal S1 and S2, no            murmur, no gallop, no rub, no click   Chest Wall:    No abnormalities observed   Abdomen:     Normal bowel sounds, no masses, no organomegaly, soft        " non-tender, non-distended, no guarding, no rebound                tenderness   Rectal:     Deferred   Extremities:   Moves all extremities well, no edema, no cyanosis, no             redness   Pulses:   Pulses palpable and equal bilaterally   Skin:   No bleeding, bruising or rash   Lymph nodes:   No palpable adenopathy   Neurologic:   Cranial nerves 2 - 12 grossly intact, sensation intact, DTR       present and equal bilaterally          Results Review:     I reviewed the patient's new clinical results.  Results from last 7 days  Lab Units 10/23/17  0416   HEMOGLOBIN A1C % 7.9*       Results from last 7 days  Lab Units 10/24/17  0539   SODIUM mmol/L 142   POTASSIUM mmol/L 4.0   CHLORIDE mmol/L 112*   CO2 mmol/L 23.0*   BUN mg/dL 18   CREATININE mg/dL 1.30   GLUCOSE mg/dL 160*   CALCIUM mg/dL 8.6       Results from last 7 days  Lab Units 10/24/17  0539 10/23/17  0416 10/22/17  0416   WBC 10*3/mm3 8.16 7.43 10.65   HEMOGLOBIN g/dL 10.6* 10.6* 12.5*   HEMATOCRIT % 31.7* 31.7* 37.2*   PLATELETS 10*3/mm3 181 195 225       Echo EF Estimated  Lab Results   Component Value Date    ECHOEFEST 56 10/22/2017         Physical Exam    Medication Review: yes    Assessment/Plan     Active Problems:    NSTEMI (non-ST elevated myocardial infarction)- S/P revascularization of the infarct-related artery-proximal circumflex utilizing drug-eluting stent.    Essential hypertension    Bradycardia- resolved.  Able to tolerate beta blockade without significant bradycardia.    Coronary artery disease.  Native vessels.  Single-vessel coronary disease with successful revascularization of the culprit lesion in the proximal circumflex utilizing a drug-eluting stent.      Make sure the patient has a 30 day valve to record for 30 days of free brilinta.  The patient and his wife had been advised about shortness of breath as a potential side effect to this antiplatelet medication.  If this becomes an issue we will change him to a different  antiplatelet medicine such as Effient or Plavix.   He will follow-up in my outpatient cardiology clinic in 2-4 weeks.    Plan for disposition:Where: home and When:  today    Siddhartha Hicks MD  10/24/17  1:18 PM

## 2017-10-24 NOTE — DISCHARGE SUMMARY
[facility]      Name:  Ba Singh   Age:  82 y.o.  Sex:  male  :  1935  MRN:  [id number]   Visit Number:  [id number]  Primary Care Physician:  No Known Provider  Date of Discharge:  10/24/2017  Admission Date:  10/21/2017      Discharge Diagnosis:     1.  Non-ST elevation MI status post stent to the circumflex  2.  Hypertension  3.  Chronic kidney disease stage III  4.  History of pacemaker placement for sick sinus syndrome  5.  Diabetes mellitus type 2 with hemoglobin A1c of 7.9.  Active Hospital Problems (** Indicates Principal Problem)    Diagnosis Date Noted   • Essential hypertension [I10] 10/22/2017   • Bradycardia [R00.1] 10/22/2017   • NSTEMI (non-ST elevated myocardial infarction) [I21.4] 10/21/2017      Resolved Hospital Problems    Diagnosis Date Noted Date Resolved   No resolved problems to display.         Presenting Problem/History of Present Illness:    NSTEMI (non-ST elevated myocardial infarction) [I21.4]         Hospital Course:    Patient is 82-year-old  male with past medical history of hypothyroidism, peptic ulcer disease, hypertension, pacemaker for sick sinus syndrome who developed exertional chest pain while changing his tire on the day of admission.  He had 3 episodes nausea and vomiting.  He was admitted with a troponin elevated 0.13.  He had ST depression in 1-3, aVF and V3 through V6 Dr. Hicks was consulted and patient was placed on IV heparin as well as nitro drip and aspirin.  Patient was taken for left heart catheterization and was found to have a circumflex lesion which was stented.  Patient was continued on aspirin, and started on brilinta.  Patient was also placed on Coreg, Lipitor, Cozaar as well.  He was also noted to have diabetes with hemoglobin A1c of 7.9.  He has been told to start Glucophage in the past, however he has refused due to the side effects.  He will speak to his primary care physician  regarding this.  Diabetic teaching has been provided.    Today he is seen and denies any chest pressure, shortness breath, nausea, vomiting, or pain.  He has been cleared by cardiology to go home.  He is to follow-up with cardiology as well as primary care physician in one weeks time.  He is stable to be discharged home.    Procedures Performed:    Procedure(s):  Coronary angiography  Left Heart Cath  Percutaneous Coronary Intervention  Angioplasty-coronary     Ba Singh [id number] Male 1935 (82 y.o.)         Race Ethnicity Encounter Category     White or  Not  or  Emergency       Procedures      Coronary angiography     Angioplasty-coronary     Left Heart Cath     Percutaneous Coronary Intervention          Performed Date     Oct 22, 2017          Physicians      Panel Physicians Referring Physician Case Authorizing Physician     Siddhartha Hicks MD (Primary)  Siddhartha Hicks MD       Indications      NSTEMI (non-ST elevated myocardial infarction) [I21.4 (ICD-10-CM)]           Conclusion      Indication:     Non-ST elevation myocardial infarction     Procedure:     Bilateral selective coronary angiography  Balloon angioplasty times one of the proximal circumflex  Stenting ×1 of the proximal circumflex-drug-eluting stent     IMPRESSION:  · Severe single-vessel coronary artery disease   · Successful catheter-based revascularization of the infarct related artery-proximal circumflex     RECOMMENDATIONS:  · Enteric-coated aspirin indefinitely  · Dual antiplatelet therapy for a minimum of 1 year  · Emphasis on aggressive risk factor modification especially blood pressure control.  · Initiate statin based cholesterol-lowering therapy.     ----------------------------------------------------------------------------------------------------------------------     Clinical History: This is an 82-year-old male patient who developed new onset chest discomfort while changing a tire on  his van.  The patient presented to the emergency room with ongoing chest discomfort and malignant hypertension.  His 12-lead electrocardiogram showed lateral ischemic ST segment depression.  His initial troponin was minimally elevated but repeat troponin returned greatly elevated at 17.  The patient had intermittent breakthrough chest discomfort despite IV antianginal therapy.  He was offered an early invasive strategy with coronary angiography and interventional standby.  The patient had been loaded with aspirin and Brilinta the night before.     Access:  5\6 [place] arterial hemostasis sheath via the right radial artery; arterial hemostasis sheath removed when cardiac catheterization laboratory with application of a transradial band for patent hemostasis.     Procedure narrative:  The procedure was explained in detail to the patient and his family.  The patient expressed understanding and a desire to proceed. Torey's testing demonstrated excellent collateral circulation to both hands.  The patient was brought to the cardiac catheterization laboratory where the right wrist was prepped and draped in usual sterile fashion.  One percent lidocaine was infiltrated into the skin overlying the right radial artery.  Access was obtained from the right radial artery utilizing the micropuncture technique and a 5\6 [place] slender hemostasis sheath was placed without difficulty.  The standard antispasm cocktail as well as 4000 units of unfractionated heparin was administered.  Retrograde catheterization was performed using a 6 [place] Tiger diagnostic catheter to engage  the right coronary artery .The left main coronary artery was engaged using a 6 [place] CLS 3.5 guiding catheter..  Hand injected angiograms was performed.  Contrast ventriculogram was not performed given the patient's advanced age and chronic renal insufficiency.     Angiographic Findings:  · Coronary circulation is right dominant.  · Left main:  The left main  coronary artery divides into left anterior descending and circumflex coronary arteries.  The left main coronary artery has no significant disease.  · LAD: The left anterior descending gives rise to one significant diagonal branch as well as multiple septal perforators before terminating as an apical recurrent branch.  The left anterior descending has minor luminal irregularities.  The ostium of the largest diagonal branch has a smooth 50-60% narrowing.  · LCX: The circumflex coronary artery is a nondominant system.  The proximal circumflex has a 95+ percent stenosis.  · RCA: The right coronary artery is dominant for the posterior circulation.  The ostium of the right coronary artery has a 25-30% stenosis.  The proximal right coronary artery has a smooth 40% stenosis.  The remainder of the vessel has minor luminal irregularities.     Intervention Summary     Lesion #1   Location:                                                                  Proximal circumflex  Stenosis pre-PCI:                                          95+ %  Stenosis post-PCI:                                        0 %  YASH flow pre-PCI:                                          3  YASH flow post-PCI:                                        3  ACC AHA class lesion:                       B     Guide:                                                           CLS 3.5  Anticoagulation:                        Heparin  Equipment:                                         Run-through guidewire, 2.5 x 15 mm TREK, 2.75 x 15 mm Xcience     A baseline ACT was obtained which was 150.  The patient was given an additional 2500 units of unfractionated heparin.  Repeat ACT was 180.  The patient was given an additional 2000 units of unfractionated heparin.  The stenosis in the circumflex was crossed with the coronary guidewire.  Balloon angioplasty was performed using a 2.5 x 15 mm compliant balloon.  Stenting was then performed using a 2.75 x 15 mm drug-eluting  stent.  The angiographic result was excellent.     Complications: None apparent       Consults:     Consults     Date and Time Order Name Status Description    10/21/2017 2326 Inpatient Consult to Cardiology            Pertinent Test Results:     Lab Results (all)     Procedure Component Value Units Date/Time    CBC & Differential [[id number]] Collected:  10/21/17 1825    Specimen:  Blood Updated:  10/21/17 1830    Narrative:       The following orders were created for panel order CBC & Differential.  Procedure                               Abnormality         Status                     ---------                               -----------         ------                     CBC Auto Differential[[id number]]        Abnormal            Final result                 Please view results for these tests on the individual orders.    CBC Auto Differential [[id number]]  (Abnormal) Collected:  10/21/17 1825    Specimen:  Blood Updated:  10/21/17 1830     WBC 16.02 (H) 10*3/mm3      RBC 4.01 (L) 10*6/mm3      Hemoglobin 12.4 (L) g/dL      Hematocrit 36.8 (L) %      MCV 91.8 fL      MCH 30.9 pg      MCHC 33.7 g/dL      RDW 11.6 %      RDW-SD 39.3 fl      MPV 11.2 fL      Platelets 261 10*3/mm3      Neutrophil % 79.6 %      Lymphocyte % 11.5 %      Monocyte % 6.9 %      Eosinophil % 1.2 %      Basophil % 0.2 %      Immature Grans % 0.6 %      Neutrophils, Absolute 12.75 (H) 10*3/mm3      Lymphocytes, Absolute 1.84 10*3/mm3      Monocytes, Absolute 1.10 (H) 10*3/mm3      Eosinophils, Absolute 0.20 10*3/mm3      Basophils, Absolute 0.04 10*3/mm3      Immature Grans, Absolute 0.09 (H) 10*3/mm3      nRBC 0.0 /100 WBC     Troponin I-Stat [[id number]]  (Abnormal) Collected:  10/21/17 1824    Specimen:  Blood Updated:  10/21/17 1844     Troponin I 0.130 (C) ng/mL     Narrative:       Normal Patient Upper Reference Limit (URL) (99th Percentile)=0.05 ng/mL   Non-AMI Illness Reference Limit=0.05-0.11 ng/mL   AMI Confirmation=0.12 ng/mL and  above    Comprehensive Metabolic Panel [[id number]]  (Abnormal) Collected:  10/21/17 1824    Specimen:  Blood Updated:  10/21/17 1857     Glucose 212 (H) mg/dL       Glucose >180, Hemoglobin A1C recommended.        BUN 17 mg/dL      Creatinine 1.50 (H) mg/dL      Sodium 140 mmol/L      Potassium 4.2 mmol/L      Chloride 103 mmol/L      CO2 24.0 (L) mmol/L      Calcium 8.3 (L) mg/dL      Total Protein 6.9 g/dL      Albumin 4.10 g/dL      ALT (SGPT) 36 U/L      AST (SGOT) 26 U/L      Alkaline Phosphatase 109 U/L      Total Bilirubin 0.3 mg/dL      eGFR Non African Amer 45 (L) mL/min/1.73      Globulin 2.8 gm/dL      A/G Ratio 1.5 g/dL      BUN/Creatinine Ratio 11.3     Anion Gap 17.2 mmol/L     Narrative:       The MDRD GFR formula is only valid for adults with stable renal function between ages 18 and 70.    Troponin [[id number]]  (Abnormal) Collected:  10/21/17 1824    Specimen:  Blood Updated:  10/21/17 1857     Troponin I 0.111 (H) ng/mL     Narrative:       Normal Patient Upper Reference Limit (URL) (99th Percentile)=0.03 ng/mL   Non-AMI Illness Reference Limit=0.03-0.11 ng/mL   AMI Confirmation=0.12 ng/mL and above    Protime-INR [[id number]]  (Normal) Collected:  10/21/17 1824    Specimen:  Blood Updated:  10/21/17 1924     Protime 11.3 Seconds      INR 1.03    aPTT [[id number]]  (Normal) Collected:  10/21/17 1824    Specimen:  Blood Updated:  10/21/17 1924     PTT 25.1 seconds     [place] Draw [[id number]] Collected:  10/21/17 1824    Specimen:  Blood Updated:  10/21/17 1931    Narrative:       The following orders were created for panel order [place] Draw.  Procedure                               Abnormality         Status                     ---------                               -----------         ------                     Light Blue Top[[id number]]                                   Final result               Lavender Top[[id number]]                                     Final result               Gold Top -  SST[[id number]]                                   Final result               Green Top (No Gel)[[id number]]                               Final result                 Please view results for these tests on the individual orders.    Light Blue Top [[id number]] Collected:  10/21/17 1824    Specimen:  Blood Updated:  10/21/17 1931     Extra Tube hold for add-on      Auto resulted       Lavender Top [[id number]] Collected:  10/21/17 1825    Specimen:  Blood Updated:  10/21/17 1931     Extra Tube hold for add-on      Auto resulted       Gold Top - SST [[id number]] Collected:  10/21/17 1824    Specimen:  Blood Updated:  10/21/17 1931     Extra Tube Hold for add-ons.      Auto resulted.       Green Top (No Gel) [[id number]] Collected:  10/21/17 1824    Specimen:  Blood Updated:  10/21/17 1931     Extra Tube Hold for add-ons.      Auto resulted.       aPTT [[id number]]  (Normal) Collected:  10/22/17 0106    Specimen:  Blood Updated:  10/22/17 0130     PTT 34.4 seconds     Troponin [[id number]]  (Abnormal) Collected:  10/22/17 0016    Specimen:  Blood Updated:  10/22/17 0212     Troponin I 17.200 (C) ng/mL       Corrected result. Previous result was 17.100 ng/mL on 10/22/2017 at 0146 EDT       Narrative:       Normal Patient Upper Reference Limit (URL) (99th Percentile)=0.03 ng/mL   Non-AMI Illness Reference Limit=0.03-0.11 ng/mL   AMI Confirmation=0.12 ng/mL and above    CBC Auto Differential [[id number]]  (Abnormal) Collected:  10/22/17 0416    Specimen:  Blood Updated:  10/22/17 0540     WBC 10.65 10*3/mm3      RBC 4.10 (L) 10*6/mm3      Hemoglobin 12.5 (L) g/dL      Hematocrit 37.2 (L) %      MCV 90.7 fL      MCH 30.5 pg      MCHC 33.6 g/dL      RDW 11.7 %      RDW-SD 38.9 fl      MPV 12.2 (H) fL      Platelets 225 10*3/mm3      Neutrophil % 58.7 %      Lymphocyte % 27.7 %      Monocyte % 11.0 %      Eosinophil % 1.8 %      Basophil % 0.3 %      Immature Grans % 0.5 %      Neutrophils, Absolute 6.26 10*3/mm3      Lymphocytes,  Absolute 2.95 10*3/mm3      Monocytes, Absolute 1.17 (H) 10*3/mm3      Eosinophils, Absolute 0.19 10*3/mm3      Basophils, Absolute 0.03 10*3/mm3      Immature Grans, Absolute 0.05 10*3/mm3      nRBC 0.0 /100 WBC     Lipid Panel [[id number]]  (Abnormal) Collected:  10/22/17 0416    Specimen:  Blood Updated:  10/22/17 0607     Total Cholesterol 185 mg/dL      Triglycerides 76 mg/dL      HDL Cholesterol 51 mg/dL      LDL Cholesterol  119 (H) mg/dL      VLDL Cholesterol 15.2 mg/dL      LDL/HDL Ratio 2.33    Narrative:       Reference ranges for triglycerides, VLDL cholesterol, LDL cholesterol, and HDL cholesterol are not true population normal ranges but are threshold levels for increased risk of coronary artery disease established by ATP III guidelines from the National Cholesterol Education Program.    Magnesium [[id number]]  (Normal) Collected:  10/22/17 0416    Specimen:  Blood Updated:  10/22/17 0607     Magnesium 2.0 mg/dL     Phosphorus [[id number]]  (Normal) Collected:  10/22/17 0416    Specimen:  Blood Updated:  10/22/17 0607     Phosphorus 2.7 mg/dL     Comprehensive Metabolic Panel [[id number]]  (Abnormal) Collected:  10/22/17 0416    Specimen:  Blood Updated:  10/22/17 0609     Glucose 151 (H) mg/dL      BUN 15 mg/dL      Creatinine 1.20 mg/dL      Sodium 143 mmol/L      Potassium 3.8 mmol/L      Chloride 108 (H) mmol/L      CO2 25.0 (L) mmol/L      Calcium 8.5 mg/dL      Total Protein 6.4 g/dL      Albumin 3.60 g/dL      ALT (SGPT) 41 U/L      AST (SGOT) 115 (H) U/L      Alkaline Phosphatase 114 U/L      Total Bilirubin 0.5 mg/dL      eGFR Non African Amer 58 (L) mL/min/1.73      Globulin 2.8 gm/dL      A/G Ratio 1.3 g/dL      BUN/Creatinine Ratio 12.5     Anion Gap 13.8 mmol/L     Narrative:       The MDRD GFR formula is only valid for adults with stable renal function between ages 18 and 70.    TSH [[id number]]  (Normal) Collected:  10/22/17 0416    Specimen:  Blood Updated:  10/22/17 0632     TSH 1.060  mIU/mL     aPTT [[id number]]  (Abnormal) Collected:  10/22/17 0621    Specimen:  Blood Updated:  10/22/17 0714     .0 (C) seconds     Troponin [[id number]]  (Abnormal) Collected:  10/22/17 0621    Specimen:  Blood Updated:  10/22/17 0730     Troponin I 23.600 (C) ng/mL     Narrative:       Normal Patient Upper Reference Limit (URL) (99th Percentile)=0.03 ng/mL   Non-AMI Illness Reference Limit=0.03-0.11 ng/mL   AMI Confirmation=0.12 ng/mL and above    POC Activated Clotting Time [[id number]]  (Abnormal) Collected:  10/22/17 1142    Specimen:  Blood Updated:  10/22/17 1236     Activated Clotting Time  233 (H) Seconds       Serial Number: [id number]:  [id number]       CBC (No Diff) [[id number]]  (Abnormal) Collected:  10/23/17 0416    Specimen:  Blood Updated:  10/23/17 0513     WBC 7.43 10*3/mm3      RBC 3.45 (L) 10*6/mm3      Hemoglobin 10.6 (L) g/dL      Hematocrit 31.7 (L) %      MCV 91.9 fL      MCH 30.7 pg      MCHC 33.4 g/dL      RDW 11.9 %      RDW-SD 40.1 fl      MPV 12.3 (H) fL      Platelets 195 10*3/mm3     Basic Metabolic Panel [[id number]]  (Abnormal) Collected:  10/23/17 0415    Specimen:  Blood Updated:  10/23/17 0529     Glucose 142 (H) mg/dL      BUN 16 mg/dL      Creatinine 1.30 mg/dL      Sodium 143 mmol/L      Potassium 4.4 mmol/L      Chloride 112 (H) mmol/L      CO2 24.0 (L) mmol/L      Calcium 8.2 (L) mg/dL      eGFR Non African Amer 53 (L) mL/min/1.73      BUN/Creatinine Ratio 12.3     Anion Gap 11.4 mmol/L     Narrative:       The MDRD GFR formula is only valid for adults with stable renal function between ages 18 and 70.    Lipid Panel [[id number]] Collected:  10/23/17 0415    Specimen:  Blood Updated:  10/23/17 0529     Total Cholesterol 152 mg/dL      Triglycerides 80 mg/dL      HDL Cholesterol 43 mg/dL      LDL Cholesterol  93 mg/dL      VLDL Cholesterol 16 mg/dL      LDL/HDL Ratio 2.16    Narrative:       Reference ranges for triglycerides, VLDL cholesterol, LDL cholesterol, and HDL  cholesterol are not true population normal ranges but are threshold levels for increased risk of coronary artery disease established by ATP III guidelines from the National Cholesterol Education Program.    Hemoglobin A1c [[id number]]  (Abnormal) Collected:  10/23/17 0416    Specimen:  Blood Updated:  10/23/17 1926     Hemoglobin A1C 7.9 (H) %     Narrative:       Expected HgbA1C results:     3% to 6% HgbA1C      HgbA1C                 Estimated Average Glucose     5%                              97 mg/dl   6%                             126 mg/dl   7%                             154 mg/dl   8%                             183 mg/dl   9%                             212 mg/dl  >10%                           >240 mg/dl      CBC (No Diff) [[id number]]  (Abnormal) Collected:  10/24/17 0539    Specimen:  Blood Updated:  10/24/17 0611     WBC 8.16 10*3/mm3      RBC 3.44 (L) 10*6/mm3      Hemoglobin 10.6 (L) g/dL      Hematocrit 31.7 (L) %      MCV 92.2 fL      MCH 30.8 pg      MCHC 33.4 g/dL      RDW 11.9 %      RDW-SD 40.3 fl      MPV 12.1 (H) fL      Platelets 181 10*3/mm3     Renal Function Panel [[id number]]  (Abnormal) Collected:  10/24/17 0539    Specimen:  Blood Updated:  10/24/17 0621     Glucose 160 (H) mg/dL      BUN 18 mg/dL      Creatinine 1.30 mg/dL      Sodium 142 mmol/L      Potassium 4.0 mmol/L      Chloride 112 (H) mmol/L      CO2 23.0 (L) mmol/L      Calcium 8.6 mg/dL      Albumin 3.10 (L) g/dL      Phosphorus 2.9 mg/dL      Anion Gap 11.0 mmol/L      BUN/Creatinine Ratio 13.8     eGFR Non African Amer 53 (L) mL/min/1.73     Narrative:       The MDRD GFR formula is only valid for adults with stable renal function between ages 18 and 70.    Magnesium [[id number]]  (Normal) Collected:  10/24/17 0539    Specimen:  Blood Updated:  10/24/17 0621     Magnesium 1.9 mg/dL           Imaging Results (all)     Procedure Component Value Units Date/Time    XR Chest 2 View [[id number]] Collected:  10/22/17 0843     Updated:  " 10/22/17 0844    Narrative:       2-VIEW CHEST      INDICATION: Chest pain.     FINDINGS: 2 views compared with 07/28/2016. EKG leads overlie the chest.  Left-sided cardiac pacemaker. Heart size is normal. Lung volumes are  diminished. Aortic calcification. No pneumothorax. Trace right basilar  opacity. Degenerative changes are seen in the spine and the shoulders.       Impression:       Diminished lung volume with minimal right basilar opacity  favored to represent atelectasis. Developing infiltrate otherwise  thought less likely.          Condition on Discharge:      Stable    Vital Signs:    /56  Pulse 62  Temp 98 °F (36.7 °C)  Resp 20  Ht 66\" (167.6 cm)  Wt 143 lb 9.6 oz (65.1 kg)  SpO2 99%  BMI 23.18 kg/m2    Physical Exam:      General Appearance:    Alert, cooperative, in no acute distress   Head:    Normocephalic, without obvious abnormality, atraumatic   Eyes:            Lids and lashes normal, conjunctivae and sclerae normal, no   icterus, no pallor, corneas clear, PERRLA   Ears:    Ears appear intact with no abnormalities noted   Throat:   No oral lesions, no thrush, oral mucosa moist   Neck:   No adenopathy, supple, trachea midline, no thyromegaly, no   carotid bruit, no JVD   Back:     No kyphosis present, no scoliosis present, no skin lesions,      erythema or scars, no tenderness to percussion or                   palpation,   range of motion normal   Lungs:     Clear to auscultation,respirations regular, even and                  unlabored    Heart:    Regular rhythm and normal rate, normal S1 and S2, no            murmur, no gallop, no rub, no click   Chest Wall:    No abnormalities observed   Abdomen:     Normal bowel sounds, no masses, no organomegaly, soft        non-tender, non-distended, no guarding, no rebound                tenderness   Rectal:     Deferred   Extremities:   Moves all extremities well, no edema, no cyanosis, no             redness   Pulses:   Pulses palpable and " equal bilaterally   Skin:   No bleeding, bruising or rash   Lymph nodes:   No palpable adenopathy   Neurologic:   Cranial nerves 2 - 12 grossly intact, sensation intact, DTR       present and equal bilaterally         Discharge Disposition:  Home or Self Care    Discharge Medication:     Ba Singh   Home Medication Instructions SALLIE:[id number]    Printed on:10/24/17 7675   Medication Information                      amLODIPine (NORVASC) 5 MG tablet  Take 1 tablet by mouth Daily.             aspirin 81 MG chewable tablet  Chew 1 tablet Daily.             atorvastatin (LIPITOR) 80 MG tablet  Take 1 tablet by mouth Every Night.             carvedilol (COREG) 25 MG tablet  Take 1 tablet by mouth 2 (Two) Times a Day.             losartan (COZAAR) 50 MG tablet  Take 1 tablet by mouth Daily.             nitroglycerin (NITROSTAT) 0.4 MG SL tablet  Place 1 tablet under the tongue Every 5 (Five) Minutes As Needed for Chest Pain. Take no more than 3 doses in 15 minutes.             pantoprazole (PROTONIX) 40 MG EC tablet  Take 1 tablet by mouth Daily.             ticagrelor (BRILINTA) 90 MG tablet tablet  Take 1 tablet by mouth 2 (Two) Times a Day.                 Discharge Diet:     healthy heart/diabetic diet    Activity at Discharge:     as tolerated    Follow-up Appointments:  Follow-up with PCP in one week  Follow up with Dr. Hicks in one week  Future Appointments  Date Time Provider Department Center   11/14/2017 2:00 PM  [facility]         Test Results Pending at Discharge:           Sixto Gtz DO  10/24/17  11:45 AM

## 2017-10-24 NOTE — PLAN OF CARE
Problem: Patient Care Overview (Adult)  Goal: Plan of Care Review  Outcome: Ongoing (interventions implemented as appropriate)    10/24/17 1312   Coping/Psychosocial Response Interventions   Plan Of Care Reviewed With patient       Goal: Adult Individualization and Mutuality  Outcome: Ongoing (interventions implemented as appropriate)  Goal: Discharge Needs Assessment  Outcome: Ongoing (interventions implemented as appropriate)    Problem: Acute Coronary Syndrome (ACS) (Adult)  Goal: Signs and Symptoms of Listed Potential Problems Will be Absent or Manageable (Acute Coronary Syndrome)  Outcome: Ongoing (interventions implemented as appropriate)    Problem: Cardiac Catheterization with/without PCI (Adult)  Goal: Signs and Symptoms of Listed Potential Problems Will be Absent or Manageable (Cardiac Catheterization with/without PCI)  Outcome: Ongoing (interventions implemented as appropriate)

## 2017-10-25 NOTE — CONSULTS
"Diabetes Education  Assessment/Teaching    Patient Name:  aB Singh  YOB: 1935  MRN: [id number]  Admit Date:  10/21/2017      Assessment Date:  10/25/2017       Most Recent Value    General Information      Height  5' 6\" (1.676 m)    Height Method  Stated    Weight  143 lb 9.6 oz (65.1 kg)    Weight Method  Bed scale    Pregnancy Assessment     Diabetes History     What type of diabetes do you have?  Type 2    Education Preferences     What areas of diabetes would you like to learn about?  -- [Pt. given Living Well with Diabetes book and [facility] diabetes ed. handout and contents reviewed together. Pt. states was pre-diabetic and now thinks he has Type 2 diabetes. Plans to discuss with PCP.]    Barriers to Learning  -- [Ready for discharge. Met with patient yesterday afternoon just prior to discharge. Wife present and he gave permission for me to speak to him with her present. She appears to be supportive and was also interested in materials. ]    Nutrition Information     Assessment Topics     DM Goals                Most Recent Value    DM Education Needs     Meter  -- [States has working glucometer at home and supplies. Declines need for glucometer I brought for him.]    Meter Type  -- [Not sure of brand he has at home.]    Frequency of Testing  -- [Patient encouraged to test once daily, varying the time of day unless specific time asked for by PCP, and PRN. States will log results and take to PCP.]    Blood Glucose Target  -- [Current ADA target goals reviewed with advice to discuss with PCP to get these individualized for him.]    Medication  -- [Pt.  was prescribed Metformin in past but had to stop due to GI side affects. Taught that Metformin best to titrate gradually and take with food. Also informed him of availability of sustained release forms that are sometimes better tolerated..]    Problem Solving  Hypoglycemia, Hyperglycemia, Sick days, Signs, Symptoms, Treatment [Briefly " discussed s/s and tx. of above. Sick day care as per Living Well with Diabetes reviewed with advice to discuss with PCP to develop specific plan for him.]    Reducing Risks  A1C testing, Eye exam, Dental exam, Foot care [Informed of importance of preventive healthcare measures and these covered more indepth in Living Well with Diabetes book.]    Healthy Eating  -- [Healthy eating via My Plate reviewed. Basic carb counting info covered in LWD book. Says has met with [facility] RD. Says he plans to attend [facility] diabetes class and RD will get referral taken care of.]    Physical Activity  -- [Benefits of physical activity discussed (with guidance of healthcare providers). Says he is aware of availability of cardiac rehab programs, etc. here at [facility]]    Healthy Coping  -- [Effect of both emotional and physical stress on blood glucose reviewed. Some healthy coping measures covered in Living Well with Diabetes book.]    Discharge Plan  Home    Motivation  Engaged    Teaching Method  Explanation, Discussion, Handouts, Teach back    Patient Response  Verbalized understanding            Other Comments: Met with patient and spouse yesterday afternoon for diabetes education. Patient states already familiar with blood glucose testing and has glucometer at home. Also says he plans to attend diabetes ed. Class here at [facility]. Will make sure Mary Jane in Clinical Nutrition has this information so that referral can be requested.         Electronically signed by:  Divine Pearson RN  10/25/17 9:49 AM

## 2017-10-31 ENCOUNTER — OFFICE VISIT (OUTPATIENT)
Dept: CARDIOLOGY | Facility: CLINIC | Age: 82
End: 2017-10-31

## 2017-10-31 VITALS
OXYGEN SATURATION: 99 % | SYSTOLIC BLOOD PRESSURE: 120 MMHG | HEART RATE: 66 BPM | DIASTOLIC BLOOD PRESSURE: 50 MMHG | WEIGHT: 140.2 LBS | RESPIRATION RATE: 16 BRPM | HEIGHT: 66 IN | BODY MASS INDEX: 22.53 KG/M2

## 2017-10-31 DIAGNOSIS — I25.10 CORONARY ARTERY DISEASE INVOLVING NATIVE CORONARY ARTERY OF NATIVE HEART WITHOUT ANGINA PECTORIS: ICD-10-CM

## 2017-10-31 DIAGNOSIS — R06.02 SOB (SHORTNESS OF BREATH): Primary | ICD-10-CM

## 2017-10-31 DIAGNOSIS — I21.4 NSTEMI (NON-ST ELEVATED MYOCARDIAL INFARCTION) (HCC): ICD-10-CM

## 2017-10-31 DIAGNOSIS — I10 ESSENTIAL HYPERTENSION: ICD-10-CM

## 2017-10-31 PROCEDURE — 99214 OFFICE O/P EST MOD 30 MIN: CPT | Performed by: INTERNAL MEDICINE

## 2017-10-31 NOTE — PROGRESS NOTES
Subjective:     Encounter Date:10/31/2017      Patient ID: Ba Singh is a 82 y.o. male.    Chief Complaint:Shortness of breath  HPI  This is an 82-year-old male patient who presents to the cardiology clinic in follow-up after having a recent non-ST elevation myocardial infarction.  The patient underwent coronary angiography which disclosed a subtotally occluded proximal circumflex.  This lesion was repaired with a drug-eluting stent.  He was demonstrated to have single-vessel coronary disease.  There was no significant decrease in his global left ventricular systolic function and he has not manifest evidence of recurrent angina or congestive heart failure.  His chest discomfort has now completely resolved.  He has no exertional chest arm neck jaw shoulder or back discomfort.  The patient indicates that he has some shortness of breath at rest but not with exertion.  He indicates that he will take a couple deep breaths and shortness of breath we will past.  This seems to be more prominent after taking his evening dose of Brilinta.  He has no orthopnea PND or lower extremity edema.  There is no dizziness palpitations or syncope.  He remains a nonsmoker.  He has been referred to cardiac rehabilitation later this month.  The following portions of the patient's history were reviewed and updated as appropriate: allergies, current medications, past family history, past medical history, past social history, past surgical history and problem  Review of Systems   Constitution: Negative for chills, diaphoresis, fever, weakness, malaise/fatigue, night sweats, weight gain and weight loss.   HENT: Negative for ear discharge, hearing loss, hoarse voice and nosebleeds.    Eyes: Negative for discharge, double vision, pain and photophobia.   Cardiovascular: Negative for cyanosis, orthopnea, palpitations and paroxysmal nocturnal dyspnea.   Respiratory: Positive for shortness of breath. Negative for cough, hemoptysis,  "sputum production and wheezing.    Endocrine: Negative for cold intolerance, heat intolerance, polydipsia, polyphagia and polyuria.   Hematologic/Lymphatic: Negative for adenopathy and bleeding problem. Does not bruise/bleed easily.   Skin: Negative for color change, flushing, itching and rash.   Musculoskeletal: Negative for muscle cramps, muscle weakness, myalgias and stiffness.   Gastrointestinal: Negative for abdominal pain, diarrhea, hematemesis, hematochezia, nausea and vomiting.   Genitourinary: Negative for dysuria, frequency and nocturia.   Neurological: Negative for dizziness, focal weakness, loss of balance, numbness, paresthesias and seizures.   Psychiatric/Behavioral: Negative for altered mental status, hallucinations and suicidal ideas.   Allergic/Immunologic: Negative for HIV exposure, hives and persistent infections.       Current Outpatient Prescriptions:   •  amLODIPine (NORVASC) 5 MG tablet, Take 1 tablet by mouth Daily., Disp: 30 tablet, Rfl: 0  •  aspirin 81 MG chewable tablet, Chew 1 tablet Daily., Disp: 30 tablet, Rfl: 0  •  atorvastatin (LIPITOR) 80 MG tablet, Take 1 tablet by mouth Every Night., Disp: 30 tablet, Rfl: 0  •  carvedilol (COREG) 25 MG tablet, Take 1 tablet by mouth 2 (Two) Times a Day., Disp: 60 tablet, Rfl: 0  •  losartan (COZAAR) 50 MG tablet, Take 1 tablet by mouth Daily., Disp: 30 tablet, Rfl: 0  •  nitroglycerin (NITROSTAT) 0.4 MG SL tablet, Place 1 tablet under the tongue Every 5 (Five) Minutes As Needed for Chest Pain. Take no more than 3 doses in 15 minutes., Disp: 30 tablet, Rfl: 12  •  pantoprazole (PROTONIX) 40 MG EC tablet, Take 1 tablet by mouth Daily., Disp: 30 tablet, Rfl: 0  •  ticagrelor (BRILINTA) 90 MG tablet tablet, Take 1 tablet by mouth 2 (Two) Times a Day., Disp: 60 tablet, Rfl: 0     Objective:     Physical Exam  Blood pressure 120/50, pulse 66, resp. rate 16, height 66\" (167.6 cm), weight 140 lb 3.2 oz (63.6 kg), SpO2 99 %.   Lab Review:     "   Assessment:         1. SOB (shortness of breath)  I suspect this is at least partly due to a side effect from his Brilinta.    2. Essential hypertension  Excellent blood pressure control.    3. Coronary artery disease involving native coronary artery of native heart without angina pectoris  Single-vessel coronary artery disease with subsequent catheter-based revascularization of the culprit lesion in the proximal circumflex utilizing a drug-eluting stent.  His angina has now completely resolved.    4. NSTEMI (non-ST elevated myocardial infarction)  The patient experienced a non-ST elevation myocardial infarction due to subtotal occlusion of the proximal circumflex.  He had a limited myocardial infarction with no significant decrease in his global ejection fraction.  The infarct-related circumflex was stented utilizing a drug-eluting stent.  He has had complete resolution of his chest discomfort and there have been no signs or symptoms of underlying arrhythmia and/or congestive heart failure.  Procedures     Plan:       The patient has been encouraged that the shortness of breath is a side effect from his antiplatelet therapy.  This will typically resolve over the next one to 2 months.  If after 60 days of therapy he continues to have shortness of breath we will look to change him back to a different antiplatelet therapy such as generic Plavix.  The patient's Brilinta is currently being supplied through the [facility] but is costing him $40 per month.  This is vastly less expensive than the initial $400 per month he was going to be charged through a commercial pharmacy.  Patient has been encouraged to take his enteric-coated baby aspirin daily.  Over the last few days he has been holding the baby aspirin due to increased bleeding when he pricks his finger for blood glucose testing.  The patient has been encouraged to follow through with plans to start cardiac rehabilitation in November.   No changes to his medication profile have been made at today's visit.  No additional cardiovascular testing is warranted at this time.

## 2017-11-14 ENCOUNTER — TREATMENT (OUTPATIENT)
Dept: CARDIAC REHAB | Facility: HOSPITAL | Age: 82
End: 2017-11-14
Attending: INTERNAL MEDICINE

## 2017-11-14 DIAGNOSIS — Z95.5 STENTED CORONARY ARTERY: ICD-10-CM

## 2017-11-14 DIAGNOSIS — I21.4 NSTEMI (NON-ST ELEVATED MYOCARDIAL INFARCTION) (HCC): Primary | ICD-10-CM

## 2017-11-14 PROCEDURE — 93797 PHYS/QHP OP CAR RHAB WO ECG: CPT

## 2017-11-14 PROCEDURE — 93798 PHYS/QHP OP CAR RHAB W/ECG: CPT

## 2017-11-14 NOTE — PROGRESS NOTES
Pt was seen today in CR for a Phase 2 visit.  Vital signs and session notes recorded in [email or web address] and will be scanned into Epic by HIM.

## 2017-11-16 ENCOUNTER — TREATMENT (OUTPATIENT)
Dept: CARDIAC REHAB | Facility: HOSPITAL | Age: 82
End: 2017-11-16
Attending: INTERNAL MEDICINE

## 2017-11-16 DIAGNOSIS — Z95.5 STENTED CORONARY ARTERY: ICD-10-CM

## 2017-11-16 DIAGNOSIS — I21.4 NSTEMI (NON-ST ELEVATED MYOCARDIAL INFARCTION) (HCC): Primary | ICD-10-CM

## 2017-11-16 PROCEDURE — 93798 PHYS/QHP OP CAR RHAB W/ECG: CPT

## 2017-11-16 NOTE — PROGRESS NOTES
Pt was seen today in CR for a Phase 2 visit.  Vital signs and session notes recorded in [employer] and will be scanned into Epic by HIM.

## 2017-11-20 ENCOUNTER — TREATMENT (OUTPATIENT)
Dept: CARDIAC REHAB | Facility: HOSPITAL | Age: 82
End: 2017-11-20

## 2017-11-20 DIAGNOSIS — Z95.5 STENTED CORONARY ARTERY: ICD-10-CM

## 2017-11-20 DIAGNOSIS — I21.4 NSTEMI (NON-ST ELEVATED MYOCARDIAL INFARCTION) (HCC): Primary | ICD-10-CM

## 2017-11-20 PROCEDURE — 93798 PHYS/QHP OP CAR RHAB W/ECG: CPT

## 2017-11-20 NOTE — PROGRESS NOTES
Pt was seen today in CR for a Phase 2 visit.  Vital signs and session notes recorded in [employer] and will be scanned into Epic by HIM.

## 2017-11-21 ENCOUNTER — TREATMENT (OUTPATIENT)
Dept: CARDIAC REHAB | Facility: HOSPITAL | Age: 82
End: 2017-11-21
Attending: INTERNAL MEDICINE

## 2017-11-21 DIAGNOSIS — I21.4 NSTEMI (NON-ST ELEVATED MYOCARDIAL INFARCTION) (HCC): Primary | ICD-10-CM

## 2017-11-21 PROCEDURE — 93798 PHYS/QHP OP CAR RHAB W/ECG: CPT

## 2017-11-22 ENCOUNTER — TREATMENT (OUTPATIENT)
Dept: CARDIAC REHAB | Facility: HOSPITAL | Age: 82
End: 2017-11-22

## 2017-11-22 DIAGNOSIS — I21.4 NSTEMI (NON-ST ELEVATED MYOCARDIAL INFARCTION) (HCC): Primary | ICD-10-CM

## 2017-11-22 DIAGNOSIS — Z95.5 STENTED CORONARY ARTERY: ICD-10-CM

## 2017-11-22 PROCEDURE — 93798 PHYS/QHP OP CAR RHAB W/ECG: CPT

## 2017-11-22 NOTE — PROGRESS NOTES
Pt was seen today in CR for a Phase 2 visit.  Vital signs and session notes recorded in [employer] and will be scanned into Epic by HIM.

## 2017-11-24 ENCOUNTER — APPOINTMENT (OUTPATIENT)
Dept: CARDIAC REHAB | Facility: HOSPITAL | Age: 82
End: 2017-11-24

## 2017-11-27 ENCOUNTER — TREATMENT (OUTPATIENT)
Dept: CARDIAC REHAB | Facility: HOSPITAL | Age: 82
End: 2017-11-27

## 2017-11-27 DIAGNOSIS — I21.4 NSTEMI (NON-ST ELEVATED MYOCARDIAL INFARCTION) (HCC): Primary | ICD-10-CM

## 2017-11-27 DIAGNOSIS — Z95.5 STENTED CORONARY ARTERY: ICD-10-CM

## 2017-11-27 PROCEDURE — 93798 PHYS/QHP OP CAR RHAB W/ECG: CPT

## 2017-11-27 NOTE — PROGRESS NOTES
Pt was seen today in CR for a Phase 2 visit.  Vital signs and session notes recorded in [employer] and will be scanned into Epic by HIM.

## 2017-11-29 ENCOUNTER — TREATMENT (OUTPATIENT)
Dept: CARDIAC REHAB | Facility: HOSPITAL | Age: 82
End: 2017-11-29

## 2017-11-29 DIAGNOSIS — Z95.5 STENTED CORONARY ARTERY: ICD-10-CM

## 2017-11-29 DIAGNOSIS — I21.4 NSTEMI (NON-ST ELEVATED MYOCARDIAL INFARCTION) (HCC): Primary | ICD-10-CM

## 2017-11-29 PROCEDURE — 93798 PHYS/QHP OP CAR RHAB W/ECG: CPT

## 2017-11-29 NOTE — PROGRESS NOTES
Pt was seen today in CR for a Phase 2 visit.  Vital signs and session notes recorded in [employer] and will be scanned into Epic by HIM.

## 2017-12-01 ENCOUNTER — TREATMENT (OUTPATIENT)
Dept: CARDIAC REHAB | Facility: HOSPITAL | Age: 82
End: 2017-12-01

## 2017-12-01 DIAGNOSIS — I21.4 NSTEMI (NON-ST ELEVATED MYOCARDIAL INFARCTION) (HCC): Primary | ICD-10-CM

## 2017-12-01 DIAGNOSIS — Z95.5 STENTED CORONARY ARTERY: ICD-10-CM

## 2017-12-01 PROCEDURE — 93798 PHYS/QHP OP CAR RHAB W/ECG: CPT

## 2017-12-01 NOTE — PROGRESS NOTES
Pt was seen today in CR for a Phase 2 visit.  Vital signs and session notes recorded in [employer] and will be scanned into Epic by HIM.

## 2017-12-04 ENCOUNTER — TREATMENT (OUTPATIENT)
Dept: CARDIAC REHAB | Facility: HOSPITAL | Age: 82
End: 2017-12-04

## 2017-12-04 DIAGNOSIS — I21.4 NSTEMI (NON-ST ELEVATED MYOCARDIAL INFARCTION) (HCC): Primary | ICD-10-CM

## 2017-12-04 PROCEDURE — 93798 PHYS/QHP OP CAR RHAB W/ECG: CPT

## 2017-12-06 ENCOUNTER — TREATMENT (OUTPATIENT)
Dept: CARDIAC REHAB | Facility: HOSPITAL | Age: 82
End: 2017-12-06

## 2017-12-06 DIAGNOSIS — I21.4 NSTEMI (NON-ST ELEVATED MYOCARDIAL INFARCTION) (HCC): Primary | ICD-10-CM

## 2017-12-06 PROCEDURE — 93798 PHYS/QHP OP CAR RHAB W/ECG: CPT

## 2017-12-08 ENCOUNTER — TREATMENT (OUTPATIENT)
Dept: CARDIAC REHAB | Facility: HOSPITAL | Age: 82
End: 2017-12-08

## 2017-12-08 DIAGNOSIS — I21.4 NSTEMI (NON-ST ELEVATED MYOCARDIAL INFARCTION) (HCC): Primary | ICD-10-CM

## 2017-12-08 DIAGNOSIS — Z95.5 STENTED CORONARY ARTERY: ICD-10-CM

## 2017-12-08 PROCEDURE — 93798 PHYS/QHP OP CAR RHAB W/ECG: CPT

## 2017-12-08 NOTE — PROGRESS NOTES
Pt was seen today in CR for a Phase 2 visit.  Vital signs and session notes recorded in [employer] and will be scanned into Epic by HIM.

## 2017-12-11 ENCOUNTER — APPOINTMENT (OUTPATIENT)
Dept: CARDIAC REHAB | Facility: HOSPITAL | Age: 82
End: 2017-12-11

## 2017-12-13 ENCOUNTER — TREATMENT (OUTPATIENT)
Dept: CARDIAC REHAB | Facility: HOSPITAL | Age: 82
End: 2017-12-13

## 2017-12-13 DIAGNOSIS — I21.4 NSTEMI (NON-ST ELEVATED MYOCARDIAL INFARCTION) (HCC): Primary | ICD-10-CM

## 2017-12-13 PROCEDURE — 93798 PHYS/QHP OP CAR RHAB W/ECG: CPT

## 2017-12-15 ENCOUNTER — TREATMENT (OUTPATIENT)
Dept: CARDIAC REHAB | Facility: HOSPITAL | Age: 82
End: 2017-12-15

## 2017-12-15 DIAGNOSIS — I21.4 NSTEMI (NON-ST ELEVATED MYOCARDIAL INFARCTION) (HCC): Primary | ICD-10-CM

## 2017-12-15 DIAGNOSIS — Z95.5 STENTED CORONARY ARTERY: ICD-10-CM

## 2017-12-15 PROCEDURE — 93798 PHYS/QHP OP CAR RHAB W/ECG: CPT

## 2017-12-15 NOTE — PROGRESS NOTES
Pt was seen today in CR for a Phase 2 visit.  Vital signs and session notes recorded in [employer] and will be scanned into Epic by HIM.

## 2017-12-18 ENCOUNTER — TREATMENT (OUTPATIENT)
Dept: CARDIAC REHAB | Facility: HOSPITAL | Age: 82
End: 2017-12-18

## 2017-12-18 DIAGNOSIS — I21.4 NSTEMI (NON-ST ELEVATED MYOCARDIAL INFARCTION) (HCC): Primary | ICD-10-CM

## 2017-12-18 PROCEDURE — 93798 PHYS/QHP OP CAR RHAB W/ECG: CPT

## 2017-12-20 ENCOUNTER — TREATMENT (OUTPATIENT)
Dept: CARDIAC REHAB | Facility: HOSPITAL | Age: 82
End: 2017-12-20

## 2017-12-20 DIAGNOSIS — Z95.5 STENTED CORONARY ARTERY: ICD-10-CM

## 2017-12-20 DIAGNOSIS — I21.4 NSTEMI (NON-ST ELEVATED MYOCARDIAL INFARCTION) (HCC): Primary | ICD-10-CM

## 2017-12-20 LAB — GLUCOSE BLDC GLUCOMTR-MCNC: 118 MG/DL (ref 70–130)

## 2017-12-20 PROCEDURE — 82962 GLUCOSE BLOOD TEST: CPT

## 2017-12-20 PROCEDURE — 93798 PHYS/QHP OP CAR RHAB W/ECG: CPT

## 2017-12-20 NOTE — PROGRESS NOTES
Pt was seen today in CR for a Phase 2 visit.  Vital signs and session notes recorded in [employer] and will be scanned into Epic by HIM.

## 2017-12-22 ENCOUNTER — TREATMENT (OUTPATIENT)
Dept: CARDIAC REHAB | Facility: HOSPITAL | Age: 82
End: 2017-12-22

## 2017-12-22 DIAGNOSIS — Z95.5 STENTED CORONARY ARTERY: ICD-10-CM

## 2017-12-22 DIAGNOSIS — I21.4 NSTEMI (NON-ST ELEVATED MYOCARDIAL INFARCTION) (HCC): Primary | ICD-10-CM

## 2017-12-22 PROCEDURE — 93798 PHYS/QHP OP CAR RHAB W/ECG: CPT

## 2017-12-22 NOTE — PROGRESS NOTES
Pt was seen today in CR for a Phase 2 visit.  Vital signs and session notes recorded in [employer] and will be scanned into Epic by HIM.

## 2017-12-25 ENCOUNTER — APPOINTMENT (OUTPATIENT)
Dept: CARDIAC REHAB | Facility: HOSPITAL | Age: 82
End: 2017-12-25

## 2017-12-27 ENCOUNTER — TREATMENT (OUTPATIENT)
Dept: CARDIAC REHAB | Facility: HOSPITAL | Age: 82
End: 2017-12-27

## 2017-12-27 DIAGNOSIS — Z95.5 STENTED CORONARY ARTERY: ICD-10-CM

## 2017-12-27 DIAGNOSIS — I21.4 NSTEMI (NON-ST ELEVATED MYOCARDIAL INFARCTION) (HCC): Primary | ICD-10-CM

## 2017-12-27 PROCEDURE — 93798 PHYS/QHP OP CAR RHAB W/ECG: CPT

## 2017-12-27 NOTE — PROGRESS NOTES
Pt was seen today in CR for a Phase 2 visit.  Vital signs and session notes recorded in [employer] and will be scanned into Epic by HIM.

## 2017-12-29 ENCOUNTER — TREATMENT (OUTPATIENT)
Dept: CARDIAC REHAB | Facility: HOSPITAL | Age: 82
End: 2017-12-29

## 2017-12-29 DIAGNOSIS — I21.4 NSTEMI (NON-ST ELEVATED MYOCARDIAL INFARCTION) (HCC): Primary | ICD-10-CM

## 2017-12-29 PROCEDURE — 93798 PHYS/QHP OP CAR RHAB W/ECG: CPT

## 2018-01-03 ENCOUNTER — TREATMENT (OUTPATIENT)
Dept: CARDIAC REHAB | Facility: HOSPITAL | Age: 83
End: 2018-01-03

## 2018-01-03 DIAGNOSIS — I21.4 NSTEMI (NON-ST ELEVATED MYOCARDIAL INFARCTION) (HCC): Primary | ICD-10-CM

## 2018-01-03 PROCEDURE — 93798 PHYS/QHP OP CAR RHAB W/ECG: CPT

## 2018-01-05 ENCOUNTER — TREATMENT (OUTPATIENT)
Dept: CARDIAC REHAB | Facility: HOSPITAL | Age: 83
End: 2018-01-05

## 2018-01-05 DIAGNOSIS — I21.4 NSTEMI (NON-ST ELEVATED MYOCARDIAL INFARCTION) (HCC): Primary | ICD-10-CM

## 2018-01-05 DIAGNOSIS — Z95.5 STENTED CORONARY ARTERY: ICD-10-CM

## 2018-01-05 PROCEDURE — 93798 PHYS/QHP OP CAR RHAB W/ECG: CPT

## 2018-01-05 NOTE — PROGRESS NOTES
Pt was seen today in CR for a Phase 2 visit.  Vital signs and session notes recorded in [employer] and will be scanned into Epic by HIM.

## 2018-01-08 ENCOUNTER — TREATMENT (OUTPATIENT)
Dept: CARDIAC REHAB | Facility: HOSPITAL | Age: 83
End: 2018-01-08

## 2018-01-08 DIAGNOSIS — Z95.5 STENTED CORONARY ARTERY: ICD-10-CM

## 2018-01-08 DIAGNOSIS — I21.4 NSTEMI (NON-ST ELEVATED MYOCARDIAL INFARCTION) (HCC): Primary | ICD-10-CM

## 2018-01-08 PROCEDURE — 93798 PHYS/QHP OP CAR RHAB W/ECG: CPT

## 2018-01-08 NOTE — PROGRESS NOTES
Pt was seen today in CR for a Phase 2 visit.  Vital signs and session notes recorded in [employer] and will be scanned into Epic by HIM.

## 2018-01-10 ENCOUNTER — TREATMENT (OUTPATIENT)
Dept: CARDIAC REHAB | Facility: HOSPITAL | Age: 83
End: 2018-01-10

## 2018-01-10 DIAGNOSIS — Z95.5 STENTED CORONARY ARTERY: ICD-10-CM

## 2018-01-10 DIAGNOSIS — I21.4 NSTEMI (NON-ST ELEVATED MYOCARDIAL INFARCTION) (HCC): Primary | ICD-10-CM

## 2018-01-10 PROCEDURE — 93798 PHYS/QHP OP CAR RHAB W/ECG: CPT

## 2018-01-10 NOTE — PROGRESS NOTES
Pt was seen today in CR for a Phase 2 visit.  Vital signs and session notes recorded in [employer] and will be scanned into Epic by HIM.

## 2018-01-10 NOTE — PROGRESS NOTES
"Nutrition Services    Patient Name:  Ba Singh  YOB: 1935  MRN: [id number]  Admit Date:  (Not on file)     Review Heart Healthy Diet/ Healthy Eating; review of chapter 2 \"Living Well with Heart Disease\" Sen Book. Provided handouts and power-point presentation emphasis’s on choose to eat healthier, portions size \"plate Method\", reading nutrition fact label, heart healthy-shopping, Choose this instead of that, and eating at home verses eating out.  Provided Fast Food Facts list. Patient was very receptive and asks appropriate questions. Provided business card and encourage to call for any questions or concerns.        Electronically signed by:  Aurora Narvaez RD  01/10/18 4:52 PM   "

## 2018-01-12 ENCOUNTER — TREATMENT (OUTPATIENT)
Dept: CARDIAC REHAB | Facility: HOSPITAL | Age: 83
End: 2018-01-12

## 2018-01-12 DIAGNOSIS — Z95.5 STENTED CORONARY ARTERY: ICD-10-CM

## 2018-01-12 DIAGNOSIS — I21.4 NSTEMI (NON-ST ELEVATED MYOCARDIAL INFARCTION) (HCC): Primary | ICD-10-CM

## 2018-01-12 PROCEDURE — 93798 PHYS/QHP OP CAR RHAB W/ECG: CPT

## 2018-01-12 NOTE — PROGRESS NOTES
Pt was seen today in CR for a Phase 2 visit.  Vital signs and session notes recorded in [employer] and will be scanned into Epic by HIM.

## 2018-01-15 ENCOUNTER — TREATMENT (OUTPATIENT)
Dept: CARDIAC REHAB | Facility: HOSPITAL | Age: 83
End: 2018-01-15

## 2018-01-15 DIAGNOSIS — Z95.5 STENTED CORONARY ARTERY: ICD-10-CM

## 2018-01-15 DIAGNOSIS — I21.4 NSTEMI (NON-ST ELEVATED MYOCARDIAL INFARCTION) (HCC): Primary | ICD-10-CM

## 2018-01-15 PROCEDURE — 93798 PHYS/QHP OP CAR RHAB W/ECG: CPT

## 2018-01-15 NOTE — PROGRESS NOTES
Pt was seen today in CR for a Phase 2 visit.  Vital signs and session notes recorded in [employer] and will be scanned into Epic by HIM.

## 2018-01-17 ENCOUNTER — TREATMENT (OUTPATIENT)
Dept: CARDIAC REHAB | Facility: HOSPITAL | Age: 83
End: 2018-01-17

## 2018-01-17 DIAGNOSIS — Z95.5 STENTED CORONARY ARTERY: ICD-10-CM

## 2018-01-17 DIAGNOSIS — I21.4 NSTEMI (NON-ST ELEVATED MYOCARDIAL INFARCTION) (HCC): Primary | ICD-10-CM

## 2018-01-17 PROCEDURE — 93798 PHYS/QHP OP CAR RHAB W/ECG: CPT

## 2018-01-17 NOTE — PROGRESS NOTES
Pt was seen today in CR for a Phase 2 visit.  Vital signs and session notes recorded in [employer] and will be scanned into Epic by HIM.

## 2018-01-19 ENCOUNTER — TREATMENT (OUTPATIENT)
Dept: CARDIAC REHAB | Facility: HOSPITAL | Age: 83
End: 2018-01-19

## 2018-01-19 DIAGNOSIS — I21.4 NSTEMI (NON-ST ELEVATED MYOCARDIAL INFARCTION) (HCC): Primary | ICD-10-CM

## 2018-01-19 DIAGNOSIS — Z95.5 STENTED CORONARY ARTERY: ICD-10-CM

## 2018-01-19 PROCEDURE — 93798 PHYS/QHP OP CAR RHAB W/ECG: CPT

## 2018-01-19 NOTE — PROGRESS NOTES
Pt was seen today in CR for a Phase 2 visit.  Vital signs and session notes recorded in [employer] and will be scanned into Epic by HIM.

## 2018-01-22 ENCOUNTER — TREATMENT (OUTPATIENT)
Dept: CARDIAC REHAB | Facility: HOSPITAL | Age: 83
End: 2018-01-22

## 2018-01-22 DIAGNOSIS — I21.4 NSTEMI (NON-ST ELEVATED MYOCARDIAL INFARCTION) (HCC): Primary | ICD-10-CM

## 2018-01-22 DIAGNOSIS — Z95.5 STENTED CORONARY ARTERY: ICD-10-CM

## 2018-01-22 PROCEDURE — 93798 PHYS/QHP OP CAR RHAB W/ECG: CPT

## 2018-01-22 NOTE — PROGRESS NOTES
Pt was seen today in CR for a Phase 2 visit.  Vital signs and session notes recorded in [employer] and will be scanned into Epic by HIM.

## 2018-01-24 ENCOUNTER — TREATMENT (OUTPATIENT)
Dept: CARDIAC REHAB | Facility: HOSPITAL | Age: 83
End: 2018-01-24

## 2018-01-24 DIAGNOSIS — I21.4 NSTEMI (NON-ST ELEVATED MYOCARDIAL INFARCTION) (HCC): Primary | ICD-10-CM

## 2018-01-24 PROCEDURE — 93798 PHYS/QHP OP CAR RHAB W/ECG: CPT

## 2018-01-26 ENCOUNTER — TREATMENT (OUTPATIENT)
Dept: CARDIAC REHAB | Facility: HOSPITAL | Age: 83
End: 2018-01-26

## 2018-01-26 DIAGNOSIS — Z95.5 STENTED CORONARY ARTERY: ICD-10-CM

## 2018-01-26 DIAGNOSIS — I21.4 NSTEMI (NON-ST ELEVATED MYOCARDIAL INFARCTION) (HCC): Primary | ICD-10-CM

## 2018-01-26 PROCEDURE — 93798 PHYS/QHP OP CAR RHAB W/ECG: CPT

## 2018-01-26 NOTE — PROGRESS NOTES
Pt was seen today in CR for a Phase 2 visit.  Vital signs and session notes recorded in [employer] and will be scanned into Epic by HIM.

## 2018-01-29 ENCOUNTER — TREATMENT (OUTPATIENT)
Dept: CARDIAC REHAB | Facility: HOSPITAL | Age: 83
End: 2018-01-29

## 2018-01-29 DIAGNOSIS — Z95.5 STENTED CORONARY ARTERY: ICD-10-CM

## 2018-01-29 DIAGNOSIS — I21.4 NSTEMI (NON-ST ELEVATED MYOCARDIAL INFARCTION) (HCC): Primary | ICD-10-CM

## 2018-01-29 PROCEDURE — 93798 PHYS/QHP OP CAR RHAB W/ECG: CPT

## 2018-01-29 NOTE — PROGRESS NOTES
Pt was seen today in CR for a Phase 2 visit.  Vital signs and session notes recorded in [email or web address] and will be scanned into Epic by HIM.

## 2018-01-30 ENCOUNTER — OFFICE VISIT (OUTPATIENT)
Dept: CARDIOLOGY | Facility: CLINIC | Age: 83
End: 2018-01-30

## 2018-01-30 VITALS
HEIGHT: 66 IN | OXYGEN SATURATION: 96 % | WEIGHT: 143 LBS | SYSTOLIC BLOOD PRESSURE: 150 MMHG | BODY MASS INDEX: 22.98 KG/M2 | HEART RATE: 70 BPM | DIASTOLIC BLOOD PRESSURE: 96 MMHG

## 2018-01-30 DIAGNOSIS — I25.10 CORONARY ARTERY DISEASE INVOLVING NATIVE CORONARY ARTERY OF NATIVE HEART WITHOUT ANGINA PECTORIS: ICD-10-CM

## 2018-01-30 DIAGNOSIS — Z95.0 CARDIAC PACEMAKER IN SITU: ICD-10-CM

## 2018-01-30 DIAGNOSIS — I10 ESSENTIAL HYPERTENSION: ICD-10-CM

## 2018-01-30 DIAGNOSIS — R00.1 BRADYCARDIA: Primary | Chronic | ICD-10-CM

## 2018-01-30 DIAGNOSIS — I49.5 SICK SINUS SYNDROME (HCC): ICD-10-CM

## 2018-01-30 PROCEDURE — 99213 OFFICE O/P EST LOW 20 MIN: CPT | Performed by: INTERNAL MEDICINE

## 2018-01-30 PROCEDURE — 93288 INTERROG EVL PM/LDLS PM IP: CPT | Performed by: INTERNAL MEDICINE

## 2018-01-30 NOTE — PROGRESS NOTES
Subjective:     Encounter Date:01/30/2018      Patient ID: Ba Singh is a 82 y.o. male.    Chief Complaint:Coronary artery disease  HPI   This is a 82-year-old male patient who presents for routine follow-up and pacemaker interrogation.  The patient has been doing cardiac rehabilitation without problems.  He has had no chest discomfort at rest or with activity.  He has no shortness of breath with activity.  There is no orthopnea PND or lower extremity edema.  He has no dizziness palpitations or syncope.  He is a nonsmoker.  He has progressed to cardiac rehabilitation without limitation or clinical issue.  Review of the rhythm strips from the patient's cardiac rehabilitation demonstrates artifact.  The patient's interrogation today shows a [employer] Model 2210 DDDr-60 with an estimated 5+ years of battery longevity.  The atrial lead impedance was 350 ohms.  The right ventricular lead impedance was 510 ohms.  The P-wave sensing was 0 at 40 the R-wave sensing was greater than 12 mV the right atrial pacing threshold was 0.87 V at 0.5 ms and the right ventricular threshold was 1.0 V at 0.5 ms the patient is paced 89% of the time in the atrium and less than 1% of the time in the ventricle.  There were no mode switches.  There was right atrial lead noise that was unable to be reproduced.  There were no high heart rate episodes.  Overall device function is normal.  The following portions of the patient's history were reviewed and updated as appropriate: allergies, current medications, past family history, past medical history, past social history, past surgical history and problem  Review of Systems   Constitution: Negative for chills, diaphoresis, fever, weakness, malaise/fatigue, night sweats, weight gain and weight loss.   HENT: Negative for ear discharge, hearing loss, hoarse voice and nosebleeds.    Eyes: Negative for discharge, double vision, pain and photophobia.   Cardiovascular:  Negative for chest pain, claudication, cyanosis, dyspnea on exertion, irregular heartbeat, leg swelling, near-syncope, orthopnea, palpitations, paroxysmal nocturnal dyspnea and syncope.   Respiratory: Negative for cough, hemoptysis, shortness of breath, sputum production and wheezing.    Endocrine: Negative for cold intolerance, heat intolerance, polydipsia, polyphagia and polyuria.   Hematologic/Lymphatic: Negative for adenopathy and bleeding problem. Does not bruise/bleed easily.   Skin: Negative for color change, flushing, itching and rash.   Musculoskeletal: Negative for muscle cramps, muscle weakness, myalgias and stiffness.   Gastrointestinal: Negative for abdominal pain, diarrhea, hematemesis, hematochezia, nausea and vomiting.   Genitourinary: Negative for dysuria, frequency and nocturia.   Neurological: Negative for dizziness, focal weakness, loss of balance, numbness, paresthesias and seizures.   Psychiatric/Behavioral: Negative for altered mental status, hallucinations and suicidal ideas.   Allergic/Immunologic: Negative for HIV exposure, hives and persistent infections.       Current Outpatient Prescriptions:   •  amLODIPine (NORVASC) 5 MG tablet, Take 1 tablet by mouth Daily., Disp: 30 tablet, Rfl: 0  •  aspirin 81 MG chewable tablet, Chew 1 tablet Daily., Disp: 30 tablet, Rfl: 0  •  atorvastatin (LIPITOR) 80 MG tablet, Take 1 tablet by mouth Every Night., Disp: 30 tablet, Rfl: 0  •  carvedilol (COREG) 25 MG tablet, Take 1 tablet by mouth 2 (Two) Times a Day., Disp: 60 tablet, Rfl: 0  •  losartan (COZAAR) 50 MG tablet, Take 1 tablet by mouth Daily., Disp: 30 tablet, Rfl: 0  •  nitroglycerin (NITROSTAT) 0.4 MG SL tablet, Place 1 tablet under the tongue Every 5 (Five) Minutes As Needed for Chest Pain. Take no more than 3 doses in 15 minutes., Disp: 30 tablet, Rfl: 12  •  pantoprazole (PROTONIX) 40 MG EC tablet, Take 1 tablet by mouth Daily., Disp: 30 tablet, Rfl: 0  •  ticagrelor (BRILINTA) 90 MG tablet  "tablet, Take 1 tablet by mouth 2 (Two) Times a Day., Disp: 60 tablet, Rfl: 0     Objective:     Physical Exam  Blood pressure 150/96, pulse 70, height 167.6 cm (65.98\"), weight 64.9 kg (143 lb), SpO2 96 %.   Lab Review:       Assessment:         1. Bradycardia  The patient has a normally functioning pacemaker.    2. Coronary artery disease involving native coronary artery of native heart without angina pectoris  Stable and angina free.    3. Essential hypertension  Blood pressure control is elevated at today's visit.  His blood pressures to cardiac rehabilitation have been excellent.  His previous blood pressures have been excellent.  I suspect this is a spurious reading.  Procedures     Plan:       No changes in his medication profile have been made at today's visit.  No additional cardiovascular testing is warranted.  The patient is encouraged to continue his active lifestyle.  He has been counseled regarding the essential need to eliminate dietary sodium intake.         "

## 2018-01-31 ENCOUNTER — TREATMENT (OUTPATIENT)
Dept: CARDIAC REHAB | Facility: HOSPITAL | Age: 83
End: 2018-01-31

## 2018-01-31 DIAGNOSIS — I21.4 NSTEMI (NON-ST ELEVATED MYOCARDIAL INFARCTION) (HCC): Primary | ICD-10-CM

## 2018-01-31 DIAGNOSIS — Z95.5 STENTED CORONARY ARTERY: ICD-10-CM

## 2018-01-31 PROCEDURE — 93798 PHYS/QHP OP CAR RHAB W/ECG: CPT

## 2018-01-31 NOTE — PROGRESS NOTES
Pt was seen today in CR for a Phase 2 visit.  Vital signs and session notes recorded in [employer] and will be scanned into Epic by HIM.

## 2018-02-02 ENCOUNTER — TREATMENT (OUTPATIENT)
Dept: CARDIAC REHAB | Facility: HOSPITAL | Age: 83
End: 2018-02-02

## 2018-02-02 DIAGNOSIS — I21.4 NSTEMI (NON-ST ELEVATED MYOCARDIAL INFARCTION) (HCC): Primary | ICD-10-CM

## 2018-02-02 PROCEDURE — 93798 PHYS/QHP OP CAR RHAB W/ECG: CPT

## 2018-02-05 ENCOUNTER — TREATMENT (OUTPATIENT)
Dept: CARDIAC REHAB | Facility: HOSPITAL | Age: 83
End: 2018-02-05

## 2018-02-05 DIAGNOSIS — I21.4 NSTEMI (NON-ST ELEVATED MYOCARDIAL INFARCTION) (HCC): Primary | ICD-10-CM

## 2018-02-05 DIAGNOSIS — Z95.5 STENTED CORONARY ARTERY: ICD-10-CM

## 2018-02-05 PROCEDURE — 93798 PHYS/QHP OP CAR RHAB W/ECG: CPT

## 2018-02-05 NOTE — PROGRESS NOTES
Pt was seen today in CR for a Phase 2 visit.  Vital signs and session notes recorded in [employer] and will be scanned into Epic by HIM.

## 2018-02-07 ENCOUNTER — TREATMENT (OUTPATIENT)
Dept: CARDIAC REHAB | Facility: HOSPITAL | Age: 83
End: 2018-02-07

## 2018-02-07 DIAGNOSIS — Z95.5 STENTED CORONARY ARTERY: ICD-10-CM

## 2018-02-07 DIAGNOSIS — I21.4 NSTEMI (NON-ST ELEVATED MYOCARDIAL INFARCTION) (HCC): Primary | ICD-10-CM

## 2018-02-07 PROCEDURE — 93798 PHYS/QHP OP CAR RHAB W/ECG: CPT

## 2018-02-07 NOTE — PROGRESS NOTES
Pt was seen today in CR for a Phase 2 visit.  Vital signs and session notes recorded in [employer] and will be scanned into Epic by HIM.

## 2018-02-09 ENCOUNTER — TREATMENT (OUTPATIENT)
Dept: CARDIAC REHAB | Facility: HOSPITAL | Age: 83
End: 2018-02-09

## 2018-02-09 DIAGNOSIS — I21.4 NSTEMI (NON-ST ELEVATED MYOCARDIAL INFARCTION) (HCC): Primary | ICD-10-CM

## 2018-02-09 DIAGNOSIS — Z95.5 STENTED CORONARY ARTERY: ICD-10-CM

## 2018-02-09 PROCEDURE — 93798 PHYS/QHP OP CAR RHAB W/ECG: CPT

## 2018-02-09 NOTE — PROGRESS NOTES
Pt was seen today in CR for a Phase 2 visit.  Vital signs and session notes recorded in [employer] and will be scanned into Epic by HIM.

## 2018-02-12 ENCOUNTER — OFFICE VISIT (OUTPATIENT)
Dept: CARDIAC REHAB | Facility: HOSPITAL | Age: 83
End: 2018-02-12

## 2018-02-12 ENCOUNTER — TRANSCRIBE ORDERS (OUTPATIENT)
Dept: CARDIAC REHAB | Facility: HOSPITAL | Age: 83
End: 2018-02-12

## 2018-02-12 DIAGNOSIS — I21.4 NON-ST ELEVATION MI (NSTEMI) (HCC): Primary | ICD-10-CM

## 2018-02-12 DIAGNOSIS — Z95.5 STENTED CORONARY ARTERY: ICD-10-CM

## 2018-02-12 DIAGNOSIS — I21.4 NSTEMI (NON-ST ELEVATED MYOCARDIAL INFARCTION) (HCC): Primary | ICD-10-CM

## 2018-02-12 DIAGNOSIS — Z95.5 S/P CORONARY ARTERY STENT PLACEMENT: ICD-10-CM

## 2018-02-14 ENCOUNTER — OFFICE VISIT (OUTPATIENT)
Dept: CARDIAC REHAB | Facility: HOSPITAL | Age: 83
End: 2018-02-14

## 2018-02-14 DIAGNOSIS — I21.4 NSTEMI (NON-ST ELEVATED MYOCARDIAL INFARCTION) (HCC): Primary | ICD-10-CM

## 2018-02-14 DIAGNOSIS — Z95.5 S/P PRIMARY ANGIOPLASTY WITH CORONARY STENT: ICD-10-CM

## 2018-02-16 ENCOUNTER — OFFICE VISIT (OUTPATIENT)
Dept: CARDIAC REHAB | Facility: HOSPITAL | Age: 83
End: 2018-02-16

## 2018-02-16 DIAGNOSIS — I21.4 NSTEMI (NON-ST ELEVATED MYOCARDIAL INFARCTION) (HCC): Primary | ICD-10-CM

## 2018-02-19 ENCOUNTER — OFFICE VISIT (OUTPATIENT)
Dept: CARDIAC REHAB | Facility: HOSPITAL | Age: 83
End: 2018-02-19

## 2018-02-19 DIAGNOSIS — I21.4 NSTEMI (NON-ST ELEVATED MYOCARDIAL INFARCTION) (HCC): Primary | ICD-10-CM

## 2018-02-21 ENCOUNTER — OFFICE VISIT (OUTPATIENT)
Dept: CARDIAC REHAB | Facility: HOSPITAL | Age: 83
End: 2018-02-21

## 2018-02-21 DIAGNOSIS — Z95.5 S/P PRIMARY ANGIOPLASTY WITH CORONARY STENT: ICD-10-CM

## 2018-02-21 DIAGNOSIS — I21.4 NSTEMI (NON-ST ELEVATED MYOCARDIAL INFARCTION) (HCC): Primary | ICD-10-CM

## 2018-02-23 ENCOUNTER — OFFICE VISIT (OUTPATIENT)
Dept: CARDIAC REHAB | Facility: HOSPITAL | Age: 83
End: 2018-02-23

## 2018-02-23 DIAGNOSIS — I21.4 NSTEMI (NON-ST ELEVATED MYOCARDIAL INFARCTION) (HCC): Primary | ICD-10-CM

## 2018-02-26 ENCOUNTER — OFFICE VISIT (OUTPATIENT)
Dept: CARDIAC REHAB | Facility: HOSPITAL | Age: 83
End: 2018-02-26

## 2018-02-26 DIAGNOSIS — I21.4 NSTEMI (NON-ST ELEVATED MYOCARDIAL INFARCTION) (HCC): Primary | ICD-10-CM

## 2018-02-28 ENCOUNTER — OFFICE VISIT (OUTPATIENT)
Dept: CARDIAC REHAB | Facility: HOSPITAL | Age: 83
End: 2018-02-28

## 2018-02-28 DIAGNOSIS — I21.4 NSTEMI (NON-ST ELEVATED MYOCARDIAL INFARCTION) (HCC): Primary | ICD-10-CM

## 2018-02-28 DIAGNOSIS — Z95.5 S/P PRIMARY ANGIOPLASTY WITH CORONARY STENT: ICD-10-CM

## 2018-03-02 ENCOUNTER — APPOINTMENT (OUTPATIENT)
Dept: CARDIAC REHAB | Facility: HOSPITAL | Age: 83
End: 2018-03-02

## 2018-03-05 ENCOUNTER — OFFICE VISIT (OUTPATIENT)
Dept: CARDIAC REHAB | Facility: HOSPITAL | Age: 83
End: 2018-03-05

## 2018-03-05 DIAGNOSIS — I21.4 NSTEMI (NON-ST ELEVATED MYOCARDIAL INFARCTION) (HCC): Primary | ICD-10-CM

## 2018-03-07 ENCOUNTER — OFFICE VISIT (OUTPATIENT)
Dept: CARDIAC REHAB | Facility: HOSPITAL | Age: 83
End: 2018-03-07

## 2018-03-07 ENCOUNTER — TELEPHONE (OUTPATIENT)
Dept: CARDIOLOGY | Facility: CLINIC | Age: 83
End: 2018-03-07

## 2018-03-07 DIAGNOSIS — I21.4 NSTEMI (NON-ST ELEVATED MYOCARDIAL INFARCTION) (HCC): Primary | ICD-10-CM

## 2018-03-07 NOTE — TELEPHONE ENCOUNTER
Patient states that he needs a release letter for [facility]. Letter needs to know if patient has any restrictions for therapy at [facility].

## 2018-03-09 ENCOUNTER — OFFICE VISIT (OUTPATIENT)
Dept: CARDIAC REHAB | Facility: HOSPITAL | Age: 83
End: 2018-03-09

## 2018-03-09 DIAGNOSIS — I21.4 NSTEMI (NON-ST ELEVATED MYOCARDIAL INFARCTION) (HCC): Primary | ICD-10-CM

## 2018-03-12 ENCOUNTER — APPOINTMENT (OUTPATIENT)
Dept: CARDIAC REHAB | Facility: HOSPITAL | Age: 83
End: 2018-03-12

## 2018-03-14 ENCOUNTER — OFFICE VISIT (OUTPATIENT)
Dept: CARDIAC REHAB | Facility: HOSPITAL | Age: 83
End: 2018-03-14

## 2018-03-14 DIAGNOSIS — I21.4 NSTEMI (NON-ST ELEVATED MYOCARDIAL INFARCTION) (HCC): Primary | ICD-10-CM

## 2018-03-16 ENCOUNTER — OFFICE VISIT (OUTPATIENT)
Dept: CARDIAC REHAB | Facility: HOSPITAL | Age: 83
End: 2018-03-16

## 2018-03-16 DIAGNOSIS — I21.4 NSTEMI (NON-ST ELEVATED MYOCARDIAL INFARCTION) (HCC): Primary | ICD-10-CM

## 2018-03-19 ENCOUNTER — OFFICE VISIT (OUTPATIENT)
Dept: CARDIAC REHAB | Facility: HOSPITAL | Age: 83
End: 2018-03-19

## 2018-03-19 DIAGNOSIS — I21.4 NSTEMI (NON-ST ELEVATED MYOCARDIAL INFARCTION) (HCC): Primary | ICD-10-CM

## 2018-03-21 ENCOUNTER — APPOINTMENT (OUTPATIENT)
Dept: CARDIAC REHAB | Facility: HOSPITAL | Age: 83
End: 2018-03-21

## 2018-03-23 ENCOUNTER — OFFICE VISIT (OUTPATIENT)
Dept: CARDIAC REHAB | Facility: HOSPITAL | Age: 83
End: 2018-03-23

## 2018-03-23 DIAGNOSIS — I21.4 NSTEMI (NON-ST ELEVATED MYOCARDIAL INFARCTION) (HCC): Primary | ICD-10-CM

## 2018-03-26 ENCOUNTER — OFFICE VISIT (OUTPATIENT)
Dept: CARDIAC REHAB | Facility: HOSPITAL | Age: 83
End: 2018-03-26

## 2018-03-26 DIAGNOSIS — I21.4 NSTEMI (NON-ST ELEVATED MYOCARDIAL INFARCTION) (HCC): Primary | ICD-10-CM

## 2018-03-28 ENCOUNTER — OFFICE VISIT (OUTPATIENT)
Dept: CARDIAC REHAB | Facility: HOSPITAL | Age: 83
End: 2018-03-28

## 2018-03-28 DIAGNOSIS — I21.4 NSTEMI (NON-ST ELEVATED MYOCARDIAL INFARCTION) (HCC): Primary | ICD-10-CM

## 2018-03-30 ENCOUNTER — OFFICE VISIT (OUTPATIENT)
Dept: CARDIAC REHAB | Facility: HOSPITAL | Age: 83
End: 2018-03-30

## 2018-03-30 DIAGNOSIS — I21.4 NSTEMI (NON-ST ELEVATED MYOCARDIAL INFARCTION) (HCC): Primary | ICD-10-CM

## 2018-04-02 ENCOUNTER — OFFICE VISIT (OUTPATIENT)
Dept: CARDIAC REHAB | Facility: HOSPITAL | Age: 83
End: 2018-04-02

## 2018-04-02 DIAGNOSIS — I21.4 NSTEMI (NON-ST ELEVATED MYOCARDIAL INFARCTION) (HCC): Primary | ICD-10-CM

## 2018-04-04 ENCOUNTER — OFFICE VISIT (OUTPATIENT)
Dept: CARDIAC REHAB | Facility: HOSPITAL | Age: 83
End: 2018-04-04

## 2018-04-04 DIAGNOSIS — I21.4 NSTEMI (NON-ST ELEVATED MYOCARDIAL INFARCTION) (HCC): Primary | ICD-10-CM

## 2018-04-06 ENCOUNTER — OFFICE VISIT (OUTPATIENT)
Dept: CARDIAC REHAB | Facility: HOSPITAL | Age: 83
End: 2018-04-06

## 2018-04-06 DIAGNOSIS — I21.4 NSTEMI (NON-ST ELEVATED MYOCARDIAL INFARCTION) (HCC): Primary | ICD-10-CM

## 2018-04-09 ENCOUNTER — OFFICE VISIT (OUTPATIENT)
Dept: CARDIAC REHAB | Facility: HOSPITAL | Age: 83
End: 2018-04-09

## 2018-04-09 DIAGNOSIS — I21.4 NSTEMI (NON-ST ELEVATED MYOCARDIAL INFARCTION) (HCC): Primary | ICD-10-CM

## 2018-04-11 ENCOUNTER — OFFICE VISIT (OUTPATIENT)
Dept: CARDIAC REHAB | Facility: HOSPITAL | Age: 83
End: 2018-04-11

## 2018-04-11 DIAGNOSIS — I21.4 NSTEMI (NON-ST ELEVATED MYOCARDIAL INFARCTION) (HCC): Primary | ICD-10-CM

## 2018-04-13 ENCOUNTER — OFFICE VISIT (OUTPATIENT)
Dept: CARDIAC REHAB | Facility: HOSPITAL | Age: 83
End: 2018-04-13

## 2018-04-13 DIAGNOSIS — I21.4 NSTEMI (NON-ST ELEVATED MYOCARDIAL INFARCTION) (HCC): Primary | ICD-10-CM

## 2018-04-16 ENCOUNTER — APPOINTMENT (OUTPATIENT)
Dept: CARDIAC REHAB | Facility: HOSPITAL | Age: 83
End: 2018-04-16

## 2018-04-18 ENCOUNTER — OFFICE VISIT (OUTPATIENT)
Dept: CARDIAC REHAB | Facility: HOSPITAL | Age: 83
End: 2018-04-18

## 2018-04-18 DIAGNOSIS — I21.4 NSTEMI (NON-ST ELEVATED MYOCARDIAL INFARCTION) (HCC): Primary | ICD-10-CM

## 2018-04-20 ENCOUNTER — APPOINTMENT (OUTPATIENT)
Dept: CARDIAC REHAB | Facility: HOSPITAL | Age: 83
End: 2018-04-20

## 2018-04-23 ENCOUNTER — APPOINTMENT (OUTPATIENT)
Dept: CARDIAC REHAB | Facility: HOSPITAL | Age: 83
End: 2018-04-23

## 2018-04-25 ENCOUNTER — APPOINTMENT (OUTPATIENT)
Dept: CARDIAC REHAB | Facility: HOSPITAL | Age: 83
End: 2018-04-25

## 2018-04-27 ENCOUNTER — APPOINTMENT (OUTPATIENT)
Dept: CARDIAC REHAB | Facility: HOSPITAL | Age: 83
End: 2018-04-27

## 2018-04-30 ENCOUNTER — APPOINTMENT (OUTPATIENT)
Dept: CARDIAC REHAB | Facility: HOSPITAL | Age: 83
End: 2018-04-30

## 2018-05-02 ENCOUNTER — APPOINTMENT (OUTPATIENT)
Dept: CARDIAC REHAB | Facility: HOSPITAL | Age: 83
End: 2018-05-02

## 2018-05-04 ENCOUNTER — APPOINTMENT (OUTPATIENT)
Dept: CARDIAC REHAB | Facility: HOSPITAL | Age: 83
End: 2018-05-04

## 2018-05-07 ENCOUNTER — APPOINTMENT (OUTPATIENT)
Dept: CARDIAC REHAB | Facility: HOSPITAL | Age: 83
End: 2018-05-07

## 2018-05-09 ENCOUNTER — APPOINTMENT (OUTPATIENT)
Dept: CARDIAC REHAB | Facility: HOSPITAL | Age: 83
End: 2018-05-09

## 2018-05-11 ENCOUNTER — APPOINTMENT (OUTPATIENT)
Dept: CARDIAC REHAB | Facility: HOSPITAL | Age: 83
End: 2018-05-11

## 2018-05-14 ENCOUNTER — APPOINTMENT (OUTPATIENT)
Dept: CARDIAC REHAB | Facility: HOSPITAL | Age: 83
End: 2018-05-14

## 2018-05-16 ENCOUNTER — APPOINTMENT (OUTPATIENT)
Dept: CARDIAC REHAB | Facility: HOSPITAL | Age: 83
End: 2018-05-16

## 2018-05-18 ENCOUNTER — APPOINTMENT (OUTPATIENT)
Dept: CARDIAC REHAB | Facility: HOSPITAL | Age: 83
End: 2018-05-18

## 2018-05-21 ENCOUNTER — APPOINTMENT (OUTPATIENT)
Dept: CARDIAC REHAB | Facility: HOSPITAL | Age: 83
End: 2018-05-21

## 2018-05-23 ENCOUNTER — APPOINTMENT (OUTPATIENT)
Dept: CARDIAC REHAB | Facility: HOSPITAL | Age: 83
End: 2018-05-23

## 2018-05-25 ENCOUNTER — APPOINTMENT (OUTPATIENT)
Dept: CARDIAC REHAB | Facility: HOSPITAL | Age: 83
End: 2018-05-25

## 2018-05-30 ENCOUNTER — APPOINTMENT (OUTPATIENT)
Dept: CARDIAC REHAB | Facility: HOSPITAL | Age: 83
End: 2018-05-30

## 2018-06-01 ENCOUNTER — APPOINTMENT (OUTPATIENT)
Dept: CARDIAC REHAB | Facility: HOSPITAL | Age: 83
End: 2018-06-01

## 2018-06-04 ENCOUNTER — APPOINTMENT (OUTPATIENT)
Dept: CARDIAC REHAB | Facility: HOSPITAL | Age: 83
End: 2018-06-04

## 2018-06-06 ENCOUNTER — APPOINTMENT (OUTPATIENT)
Dept: CARDIAC REHAB | Facility: HOSPITAL | Age: 83
End: 2018-06-06

## 2018-06-08 ENCOUNTER — APPOINTMENT (OUTPATIENT)
Dept: CARDIAC REHAB | Facility: HOSPITAL | Age: 83
End: 2018-06-08

## 2018-06-11 ENCOUNTER — APPOINTMENT (OUTPATIENT)
Dept: CARDIAC REHAB | Facility: HOSPITAL | Age: 83
End: 2018-06-11

## 2018-06-13 ENCOUNTER — APPOINTMENT (OUTPATIENT)
Dept: CARDIAC REHAB | Facility: HOSPITAL | Age: 83
End: 2018-06-13

## 2018-06-15 ENCOUNTER — APPOINTMENT (OUTPATIENT)
Dept: CARDIAC REHAB | Facility: HOSPITAL | Age: 83
End: 2018-06-15

## 2018-06-18 ENCOUNTER — APPOINTMENT (OUTPATIENT)
Dept: CARDIAC REHAB | Facility: HOSPITAL | Age: 83
End: 2018-06-18

## 2018-06-20 ENCOUNTER — APPOINTMENT (OUTPATIENT)
Dept: CARDIAC REHAB | Facility: HOSPITAL | Age: 83
End: 2018-06-20

## 2018-06-22 ENCOUNTER — APPOINTMENT (OUTPATIENT)
Dept: CARDIAC REHAB | Facility: HOSPITAL | Age: 83
End: 2018-06-22

## 2018-06-25 ENCOUNTER — APPOINTMENT (OUTPATIENT)
Dept: CARDIAC REHAB | Facility: HOSPITAL | Age: 83
End: 2018-06-25

## 2018-06-27 ENCOUNTER — APPOINTMENT (OUTPATIENT)
Dept: CARDIAC REHAB | Facility: HOSPITAL | Age: 83
End: 2018-06-27

## 2018-06-29 ENCOUNTER — APPOINTMENT (OUTPATIENT)
Dept: CARDIAC REHAB | Facility: HOSPITAL | Age: 83
End: 2018-06-29

## 2018-07-02 ENCOUNTER — OFFICE VISIT (OUTPATIENT)
Dept: CARDIAC REHAB | Facility: HOSPITAL | Age: 83
End: 2018-07-02

## 2018-07-02 DIAGNOSIS — I21.4 NSTEMI (NON-ST ELEVATED MYOCARDIAL INFARCTION) (HCC): Primary | ICD-10-CM

## 2018-07-05 ENCOUNTER — OFFICE VISIT (OUTPATIENT)
Dept: CARDIAC REHAB | Facility: HOSPITAL | Age: 83
End: 2018-07-05

## 2018-07-05 DIAGNOSIS — I21.4 NSTEMI (NON-ST ELEVATED MYOCARDIAL INFARCTION) (HCC): Primary | ICD-10-CM

## 2018-07-06 ENCOUNTER — OFFICE VISIT (OUTPATIENT)
Dept: CARDIAC REHAB | Facility: HOSPITAL | Age: 83
End: 2018-07-06

## 2018-07-06 DIAGNOSIS — I21.4 NSTEMI (NON-ST ELEVATED MYOCARDIAL INFARCTION) (HCC): Primary | ICD-10-CM

## 2018-07-09 ENCOUNTER — APPOINTMENT (OUTPATIENT)
Dept: CARDIAC REHAB | Facility: HOSPITAL | Age: 83
End: 2018-07-09

## 2018-07-11 ENCOUNTER — APPOINTMENT (OUTPATIENT)
Dept: CARDIAC REHAB | Facility: HOSPITAL | Age: 83
End: 2018-07-11

## 2018-07-13 ENCOUNTER — APPOINTMENT (OUTPATIENT)
Dept: CARDIAC REHAB | Facility: HOSPITAL | Age: 83
End: 2018-07-13

## 2018-07-16 ENCOUNTER — OFFICE VISIT (OUTPATIENT)
Dept: CARDIAC REHAB | Facility: HOSPITAL | Age: 83
End: 2018-07-16

## 2018-07-16 DIAGNOSIS — I21.4 NSTEMI (NON-ST ELEVATED MYOCARDIAL INFARCTION) (HCC): Primary | ICD-10-CM

## 2018-07-18 ENCOUNTER — OFFICE VISIT (OUTPATIENT)
Dept: CARDIAC REHAB | Facility: HOSPITAL | Age: 83
End: 2018-07-18

## 2018-07-18 DIAGNOSIS — I21.4 NSTEMI (NON-ST ELEVATED MYOCARDIAL INFARCTION) (HCC): Primary | ICD-10-CM

## 2018-07-20 ENCOUNTER — APPOINTMENT (OUTPATIENT)
Dept: CARDIAC REHAB | Facility: HOSPITAL | Age: 83
End: 2018-07-20

## 2018-07-23 ENCOUNTER — APPOINTMENT (OUTPATIENT)
Dept: CARDIAC REHAB | Facility: HOSPITAL | Age: 83
End: 2018-07-23

## 2018-07-25 ENCOUNTER — OFFICE VISIT (OUTPATIENT)
Dept: CARDIAC REHAB | Facility: HOSPITAL | Age: 83
End: 2018-07-25

## 2018-07-25 DIAGNOSIS — I21.4 NSTEMI (NON-ST ELEVATED MYOCARDIAL INFARCTION) (HCC): Primary | ICD-10-CM

## 2018-07-27 ENCOUNTER — APPOINTMENT (OUTPATIENT)
Dept: CARDIAC REHAB | Facility: HOSPITAL | Age: 83
End: 2018-07-27

## 2018-07-30 ENCOUNTER — OFFICE VISIT (OUTPATIENT)
Dept: CARDIAC REHAB | Facility: HOSPITAL | Age: 83
End: 2018-07-30

## 2018-07-30 DIAGNOSIS — I21.4 NSTEMI (NON-ST ELEVATED MYOCARDIAL INFARCTION) (HCC): Primary | ICD-10-CM

## 2018-07-31 ENCOUNTER — OFFICE VISIT (OUTPATIENT)
Dept: CARDIOLOGY | Facility: CLINIC | Age: 83
End: 2018-07-31

## 2018-07-31 VITALS
HEIGHT: 66 IN | BODY MASS INDEX: 23.14 KG/M2 | DIASTOLIC BLOOD PRESSURE: 58 MMHG | SYSTOLIC BLOOD PRESSURE: 110 MMHG | OXYGEN SATURATION: 100 % | HEART RATE: 61 BPM | WEIGHT: 144 LBS

## 2018-07-31 DIAGNOSIS — I25.10 CORONARY ARTERY DISEASE INVOLVING NATIVE CORONARY ARTERY OF NATIVE HEART WITHOUT ANGINA PECTORIS: Primary | ICD-10-CM

## 2018-07-31 DIAGNOSIS — I10 ESSENTIAL HYPERTENSION: ICD-10-CM

## 2018-07-31 DIAGNOSIS — Z95.0 CARDIAC PACEMAKER IN SITU: ICD-10-CM

## 2018-07-31 DIAGNOSIS — R00.1 BRADYCARDIA: Chronic | ICD-10-CM

## 2018-07-31 PROCEDURE — 93288 INTERROG EVL PM/LDLS PM IP: CPT | Performed by: INTERNAL MEDICINE

## 2018-07-31 PROCEDURE — 99213 OFFICE O/P EST LOW 20 MIN: CPT | Performed by: INTERNAL MEDICINE

## 2018-07-31 RX ORDER — GLIPIZIDE 5 MG/1
5 TABLET, FILM COATED, EXTENDED RELEASE ORAL DAILY
Status: ON HOLD | COMMUNITY
End: 2020-01-13

## 2018-07-31 NOTE — PROGRESS NOTES
Subjective:     Encounter Date:07/31/2018      Patient ID: Ba Singh is a 82 y.o. male.    Chief Complaint:Coronary artery disease  HPI  This is an 82-year-old male patient who presents to cardiology clinic for routine follow-up and pacemaker interrogation.  The patient has a [employer] dual-chamber rate responsive pacemaker. Model 2210 DDDr-60/110.  The generator longevity is approximately 6 years.  The atrial lead impedance is 390 ohms.  The right ventricular lead impedance is 610 ohms.  The P-wave sensing is 0.5 mV and R-wave sensing is 12.0 mV.  The right atrial pacing threshold is 1.0 V at 0.5 ms in the right ventricular pacing threshold is 0.875 V at 0.5 ms.  The patient is paced 99% of the time in the right atrium and 9.3% of the time in the right ventricle.  There is been no mode switches or high heart rate events.  Patient indicates he is doing well and remains active [place] his [place].  The patient has no chest discomfort at rest or with activity.  There is no exertional chest arm neck jaw shoulder or back discomfort.  There is no orthopnea PND or lower extremity edema.  There is no dizziness palpitations or syncope.  He remains a nonsmoker.  The following portions of the patient's history were reviewed and updated as appropriate: allergies, current medications, past family history, past medical history, past social history, past surgical history and problem  Review of Systems   Constitution: Negative for chills, diaphoresis, fever, weakness, malaise/fatigue, weight gain and weight loss.   HENT: Negative for ear discharge, hearing loss, hoarse voice and nosebleeds.    Eyes: Negative for discharge, double vision, pain and photophobia.   Cardiovascular: Negative for chest pain, claudication, cyanosis, dyspnea on exertion, irregular heartbeat, leg swelling, near-syncope, orthopnea, palpitations, paroxysmal nocturnal dyspnea and syncope.   Respiratory: Negative for cough, hemoptysis, shortness  of breath, sputum production and wheezing.    Endocrine: Negative for cold intolerance, heat intolerance, polydipsia, polyphagia and polyuria.   Hematologic/Lymphatic: Negative for adenopathy and bleeding problem. Does not bruise/bleed easily.   Skin: Negative for color change, flushing, itching and rash.   Musculoskeletal: Negative for muscle cramps, muscle weakness, myalgias and stiffness.   Gastrointestinal: Negative for abdominal pain, diarrhea, hematemesis, hematochezia, nausea and vomiting.   Genitourinary: Negative for dysuria, frequency and nocturia.   Neurological: Negative for focal weakness, loss of balance, numbness, paresthesias and seizures.   Psychiatric/Behavioral: Negative for altered mental status, hallucinations and suicidal ideas.   Allergic/Immunologic: Negative for HIV exposure, hives and persistent infections.           Current Outpatient Prescriptions:   •  amLODIPine (NORVASC) 5 MG tablet, Take 1 tablet by mouth Daily., Disp: 30 tablet, Rfl: 0  •  aspirin 81 MG chewable tablet, Chew 1 tablet Daily., Disp: 30 tablet, Rfl: 0  •  carvedilol (COREG) 25 MG tablet, Take 1 tablet by mouth 2 (Two) Times a Day., Disp: 60 tablet, Rfl: 0  •  glipiZIDE (GLUCOTROL XL) 5 MG ER tablet, Take 5 mg by mouth Daily., Disp: , Rfl:   •  nitroglycerin (NITROSTAT) 0.4 MG SL tablet, Place 1 tablet under the tongue Every 5 (Five) Minutes As Needed for Chest Pain. Take no more than 3 doses in 15 minutes., Disp: 30 tablet, Rfl: 12  •  ticagrelor (BRILINTA) 90 MG tablet tablet, Take 1 tablet by mouth 2 (Two) Times a Day., Disp: 60 tablet, Rfl: 0     Objective:     Physical Exam   Constitutional: He is oriented to person, place, and time. He appears well-developed and well-nourished.   HENT:   Head: Normocephalic and atraumatic.   Mouth/Throat: Oropharynx is clear and moist.   Eyes: Pupils are equal, round, and reactive to light. Conjunctivae and EOM are normal. No scleral icterus.   Neck: Normal range of motion. Neck  "supple. No JVD present. No tracheal deviation present. No thyromegaly present.   Cardiovascular: Normal rate, regular rhythm, S1 normal, S2 normal, normal heart sounds, intact distal pulses and normal pulses.  PMI is not displaced.  Exam reveals no gallop and no friction rub.    No murmur heard.  Pulmonary/Chest: Effort normal and breath sounds normal. No respiratory distress. He has no wheezes. He has no rales.   Abdominal: Soft. Bowel sounds are normal. He exhibits no distension and no mass. There is no tenderness. There is no rebound and no guarding.   Musculoskeletal: Normal range of motion. He exhibits no edema or deformity.   Neurological: He is alert and oriented to person, place, and time. He displays normal reflexes. No cranial nerve deficit. Coordination normal.   Skin: Skin is warm and dry. No rash noted. No erythema.   Psychiatric: He has a normal mood and affect. His behavior is normal. Thought content normal.     Blood pressure 110/58, pulse 61, height 167.6 cm (65.98\"), weight 65.3 kg (144 lb), SpO2 100 %.   Lab Review:       Assessment:         1. Coronary artery disease involving native coronary artery of native heart without angina pectoris  Stable and angina free.    2. Bradycardia  Normal pacemaker function    3. Essential hypertension  Excellent blood pressure control    Procedures     Plan:       No changes in his medication therapy have been made at today's visit.  No additional cardiovascular testing is warranted.  The patient is encouraged to maintain his active lifestyle.         "

## 2018-08-01 ENCOUNTER — APPOINTMENT (OUTPATIENT)
Dept: CARDIAC REHAB | Facility: HOSPITAL | Age: 83
End: 2018-08-01

## 2018-08-03 ENCOUNTER — APPOINTMENT (OUTPATIENT)
Dept: CARDIAC REHAB | Facility: HOSPITAL | Age: 83
End: 2018-08-03

## 2018-08-06 ENCOUNTER — APPOINTMENT (OUTPATIENT)
Dept: CARDIAC REHAB | Facility: HOSPITAL | Age: 83
End: 2018-08-06

## 2018-08-08 ENCOUNTER — APPOINTMENT (OUTPATIENT)
Dept: CARDIAC REHAB | Facility: HOSPITAL | Age: 83
End: 2018-08-08

## 2018-08-10 ENCOUNTER — APPOINTMENT (OUTPATIENT)
Dept: CARDIAC REHAB | Facility: HOSPITAL | Age: 83
End: 2018-08-10

## 2018-08-13 ENCOUNTER — APPOINTMENT (OUTPATIENT)
Dept: CARDIAC REHAB | Facility: HOSPITAL | Age: 83
End: 2018-08-13

## 2018-08-15 ENCOUNTER — APPOINTMENT (OUTPATIENT)
Dept: CARDIAC REHAB | Facility: HOSPITAL | Age: 83
End: 2018-08-15

## 2018-08-17 ENCOUNTER — APPOINTMENT (OUTPATIENT)
Dept: CARDIAC REHAB | Facility: HOSPITAL | Age: 83
End: 2018-08-17

## 2018-08-20 ENCOUNTER — APPOINTMENT (OUTPATIENT)
Dept: CARDIAC REHAB | Facility: HOSPITAL | Age: 83
End: 2018-08-20

## 2018-08-22 ENCOUNTER — APPOINTMENT (OUTPATIENT)
Dept: CARDIAC REHAB | Facility: HOSPITAL | Age: 83
End: 2018-08-22

## 2018-08-24 ENCOUNTER — APPOINTMENT (OUTPATIENT)
Dept: CARDIAC REHAB | Facility: HOSPITAL | Age: 83
End: 2018-08-24

## 2018-08-27 ENCOUNTER — APPOINTMENT (OUTPATIENT)
Dept: CARDIAC REHAB | Facility: HOSPITAL | Age: 83
End: 2018-08-27

## 2018-08-29 ENCOUNTER — APPOINTMENT (OUTPATIENT)
Dept: CARDIAC REHAB | Facility: HOSPITAL | Age: 83
End: 2018-08-29

## 2018-08-31 ENCOUNTER — APPOINTMENT (OUTPATIENT)
Dept: CARDIAC REHAB | Facility: HOSPITAL | Age: 83
End: 2018-08-31

## 2018-09-05 ENCOUNTER — APPOINTMENT (OUTPATIENT)
Dept: CARDIAC REHAB | Facility: HOSPITAL | Age: 83
End: 2018-09-05

## 2018-09-07 ENCOUNTER — APPOINTMENT (OUTPATIENT)
Dept: CARDIAC REHAB | Facility: HOSPITAL | Age: 83
End: 2018-09-07

## 2018-09-10 ENCOUNTER — APPOINTMENT (OUTPATIENT)
Dept: CARDIAC REHAB | Facility: HOSPITAL | Age: 83
End: 2018-09-10

## 2018-09-12 ENCOUNTER — APPOINTMENT (OUTPATIENT)
Dept: CARDIAC REHAB | Facility: HOSPITAL | Age: 83
End: 2018-09-12

## 2018-09-14 ENCOUNTER — APPOINTMENT (OUTPATIENT)
Dept: CARDIAC REHAB | Facility: HOSPITAL | Age: 83
End: 2018-09-14

## 2018-09-17 ENCOUNTER — APPOINTMENT (OUTPATIENT)
Dept: CARDIAC REHAB | Facility: HOSPITAL | Age: 83
End: 2018-09-17

## 2018-09-19 ENCOUNTER — APPOINTMENT (OUTPATIENT)
Dept: CARDIAC REHAB | Facility: HOSPITAL | Age: 83
End: 2018-09-19

## 2018-09-21 ENCOUNTER — APPOINTMENT (OUTPATIENT)
Dept: CARDIAC REHAB | Facility: HOSPITAL | Age: 83
End: 2018-09-21

## 2018-09-24 ENCOUNTER — APPOINTMENT (OUTPATIENT)
Dept: CARDIAC REHAB | Facility: HOSPITAL | Age: 83
End: 2018-09-24

## 2018-09-26 ENCOUNTER — APPOINTMENT (OUTPATIENT)
Dept: CARDIAC REHAB | Facility: HOSPITAL | Age: 83
End: 2018-09-26

## 2018-09-28 ENCOUNTER — APPOINTMENT (OUTPATIENT)
Dept: CARDIAC REHAB | Facility: HOSPITAL | Age: 83
End: 2018-09-28

## 2018-10-01 ENCOUNTER — APPOINTMENT (OUTPATIENT)
Dept: CARDIAC REHAB | Facility: HOSPITAL | Age: 83
End: 2018-10-01

## 2018-10-03 ENCOUNTER — APPOINTMENT (OUTPATIENT)
Dept: CARDIAC REHAB | Facility: HOSPITAL | Age: 83
End: 2018-10-03

## 2018-10-05 ENCOUNTER — APPOINTMENT (OUTPATIENT)
Dept: CARDIAC REHAB | Facility: HOSPITAL | Age: 83
End: 2018-10-05

## 2018-10-08 ENCOUNTER — APPOINTMENT (OUTPATIENT)
Dept: CARDIAC REHAB | Facility: HOSPITAL | Age: 83
End: 2018-10-08

## 2018-10-10 ENCOUNTER — APPOINTMENT (OUTPATIENT)
Dept: CARDIAC REHAB | Facility: HOSPITAL | Age: 83
End: 2018-10-10

## 2018-10-12 ENCOUNTER — APPOINTMENT (OUTPATIENT)
Dept: CARDIAC REHAB | Facility: HOSPITAL | Age: 83
End: 2018-10-12

## 2018-10-15 ENCOUNTER — APPOINTMENT (OUTPATIENT)
Dept: CARDIAC REHAB | Facility: HOSPITAL | Age: 83
End: 2018-10-15

## 2018-10-17 ENCOUNTER — APPOINTMENT (OUTPATIENT)
Dept: CARDIAC REHAB | Facility: HOSPITAL | Age: 83
End: 2018-10-17

## 2018-10-19 ENCOUNTER — APPOINTMENT (OUTPATIENT)
Dept: CARDIAC REHAB | Facility: HOSPITAL | Age: 83
End: 2018-10-19

## 2018-10-22 ENCOUNTER — APPOINTMENT (OUTPATIENT)
Dept: CARDIAC REHAB | Facility: HOSPITAL | Age: 83
End: 2018-10-22

## 2018-10-24 ENCOUNTER — APPOINTMENT (OUTPATIENT)
Dept: CARDIAC REHAB | Facility: HOSPITAL | Age: 83
End: 2018-10-24

## 2018-10-26 ENCOUNTER — APPOINTMENT (OUTPATIENT)
Dept: CARDIAC REHAB | Facility: HOSPITAL | Age: 83
End: 2018-10-26

## 2018-10-29 ENCOUNTER — APPOINTMENT (OUTPATIENT)
Dept: CARDIAC REHAB | Facility: HOSPITAL | Age: 83
End: 2018-10-29

## 2018-10-31 ENCOUNTER — APPOINTMENT (OUTPATIENT)
Dept: CARDIAC REHAB | Facility: HOSPITAL | Age: 83
End: 2018-10-31

## 2018-11-02 ENCOUNTER — APPOINTMENT (OUTPATIENT)
Dept: CARDIAC REHAB | Facility: HOSPITAL | Age: 83
End: 2018-11-02

## 2018-11-05 ENCOUNTER — APPOINTMENT (OUTPATIENT)
Dept: CARDIAC REHAB | Facility: HOSPITAL | Age: 83
End: 2018-11-05

## 2018-11-07 ENCOUNTER — APPOINTMENT (OUTPATIENT)
Dept: CARDIAC REHAB | Facility: HOSPITAL | Age: 83
End: 2018-11-07

## 2018-11-09 ENCOUNTER — APPOINTMENT (OUTPATIENT)
Dept: CARDIAC REHAB | Facility: HOSPITAL | Age: 83
End: 2018-11-09

## 2018-11-12 ENCOUNTER — APPOINTMENT (OUTPATIENT)
Dept: CARDIAC REHAB | Facility: HOSPITAL | Age: 83
End: 2018-11-12

## 2018-11-14 ENCOUNTER — APPOINTMENT (OUTPATIENT)
Dept: CARDIAC REHAB | Facility: HOSPITAL | Age: 83
End: 2018-11-14

## 2018-11-16 ENCOUNTER — APPOINTMENT (OUTPATIENT)
Dept: CARDIAC REHAB | Facility: HOSPITAL | Age: 83
End: 2018-11-16

## 2018-11-19 ENCOUNTER — APPOINTMENT (OUTPATIENT)
Dept: CARDIAC REHAB | Facility: HOSPITAL | Age: 83
End: 2018-11-19

## 2018-11-21 ENCOUNTER — APPOINTMENT (OUTPATIENT)
Dept: CARDIAC REHAB | Facility: HOSPITAL | Age: 83
End: 2018-11-21

## 2018-11-23 ENCOUNTER — APPOINTMENT (OUTPATIENT)
Dept: CARDIAC REHAB | Facility: HOSPITAL | Age: 83
End: 2018-11-23

## 2018-11-26 ENCOUNTER — APPOINTMENT (OUTPATIENT)
Dept: CARDIAC REHAB | Facility: HOSPITAL | Age: 83
End: 2018-11-26

## 2018-11-28 ENCOUNTER — APPOINTMENT (OUTPATIENT)
Dept: CARDIAC REHAB | Facility: HOSPITAL | Age: 83
End: 2018-11-28

## 2018-11-30 ENCOUNTER — APPOINTMENT (OUTPATIENT)
Dept: CARDIAC REHAB | Facility: HOSPITAL | Age: 83
End: 2018-11-30

## 2018-12-03 ENCOUNTER — APPOINTMENT (OUTPATIENT)
Dept: CARDIAC REHAB | Facility: HOSPITAL | Age: 83
End: 2018-12-03

## 2018-12-05 ENCOUNTER — APPOINTMENT (OUTPATIENT)
Dept: CARDIAC REHAB | Facility: HOSPITAL | Age: 83
End: 2018-12-05

## 2018-12-07 ENCOUNTER — APPOINTMENT (OUTPATIENT)
Dept: CARDIAC REHAB | Facility: HOSPITAL | Age: 83
End: 2018-12-07

## 2018-12-10 ENCOUNTER — APPOINTMENT (OUTPATIENT)
Dept: CARDIAC REHAB | Facility: HOSPITAL | Age: 83
End: 2018-12-10

## 2018-12-12 ENCOUNTER — APPOINTMENT (OUTPATIENT)
Dept: CARDIAC REHAB | Facility: HOSPITAL | Age: 83
End: 2018-12-12

## 2018-12-14 ENCOUNTER — APPOINTMENT (OUTPATIENT)
Dept: CARDIAC REHAB | Facility: HOSPITAL | Age: 83
End: 2018-12-14

## 2018-12-17 ENCOUNTER — APPOINTMENT (OUTPATIENT)
Dept: CARDIAC REHAB | Facility: HOSPITAL | Age: 83
End: 2018-12-17

## 2018-12-19 ENCOUNTER — APPOINTMENT (OUTPATIENT)
Dept: CARDIAC REHAB | Facility: HOSPITAL | Age: 83
End: 2018-12-19

## 2018-12-21 ENCOUNTER — APPOINTMENT (OUTPATIENT)
Dept: CARDIAC REHAB | Facility: HOSPITAL | Age: 83
End: 2018-12-21

## 2018-12-24 ENCOUNTER — APPOINTMENT (OUTPATIENT)
Dept: CARDIAC REHAB | Facility: HOSPITAL | Age: 83
End: 2018-12-24

## 2018-12-26 ENCOUNTER — APPOINTMENT (OUTPATIENT)
Dept: CARDIAC REHAB | Facility: HOSPITAL | Age: 83
End: 2018-12-26

## 2018-12-28 ENCOUNTER — APPOINTMENT (OUTPATIENT)
Dept: CARDIAC REHAB | Facility: HOSPITAL | Age: 83
End: 2018-12-28

## 2018-12-31 ENCOUNTER — APPOINTMENT (OUTPATIENT)
Dept: CARDIAC REHAB | Facility: HOSPITAL | Age: 83
End: 2018-12-31

## 2019-01-02 ENCOUNTER — APPOINTMENT (OUTPATIENT)
Dept: CARDIAC REHAB | Facility: HOSPITAL | Age: 84
End: 2019-01-02

## 2019-01-04 ENCOUNTER — APPOINTMENT (OUTPATIENT)
Dept: CARDIAC REHAB | Facility: HOSPITAL | Age: 84
End: 2019-01-04

## 2019-01-07 ENCOUNTER — APPOINTMENT (OUTPATIENT)
Dept: CARDIAC REHAB | Facility: HOSPITAL | Age: 84
End: 2019-01-07

## 2019-01-09 ENCOUNTER — APPOINTMENT (OUTPATIENT)
Dept: CARDIAC REHAB | Facility: HOSPITAL | Age: 84
End: 2019-01-09

## 2019-01-11 ENCOUNTER — APPOINTMENT (OUTPATIENT)
Dept: CARDIAC REHAB | Facility: HOSPITAL | Age: 84
End: 2019-01-11

## 2019-01-14 ENCOUNTER — APPOINTMENT (OUTPATIENT)
Dept: CARDIAC REHAB | Facility: HOSPITAL | Age: 84
End: 2019-01-14

## 2019-01-16 ENCOUNTER — APPOINTMENT (OUTPATIENT)
Dept: CARDIAC REHAB | Facility: HOSPITAL | Age: 84
End: 2019-01-16

## 2019-01-18 ENCOUNTER — APPOINTMENT (OUTPATIENT)
Dept: CARDIAC REHAB | Facility: HOSPITAL | Age: 84
End: 2019-01-18

## 2019-01-21 ENCOUNTER — APPOINTMENT (OUTPATIENT)
Dept: CARDIAC REHAB | Facility: HOSPITAL | Age: 84
End: 2019-01-21

## 2019-01-23 ENCOUNTER — APPOINTMENT (OUTPATIENT)
Dept: CARDIAC REHAB | Facility: HOSPITAL | Age: 84
End: 2019-01-23

## 2019-01-25 ENCOUNTER — APPOINTMENT (OUTPATIENT)
Dept: CARDIAC REHAB | Facility: HOSPITAL | Age: 84
End: 2019-01-25

## 2019-01-28 ENCOUNTER — APPOINTMENT (OUTPATIENT)
Dept: CARDIAC REHAB | Facility: HOSPITAL | Age: 84
End: 2019-01-28

## 2019-01-30 ENCOUNTER — APPOINTMENT (OUTPATIENT)
Dept: CARDIAC REHAB | Facility: HOSPITAL | Age: 84
End: 2019-01-30

## 2019-02-01 ENCOUNTER — APPOINTMENT (OUTPATIENT)
Dept: CARDIAC REHAB | Facility: HOSPITAL | Age: 84
End: 2019-02-01

## 2019-02-04 ENCOUNTER — APPOINTMENT (OUTPATIENT)
Dept: CARDIAC REHAB | Facility: HOSPITAL | Age: 84
End: 2019-02-04

## 2019-02-06 ENCOUNTER — APPOINTMENT (OUTPATIENT)
Dept: CARDIAC REHAB | Facility: HOSPITAL | Age: 84
End: 2019-02-06

## 2019-02-08 ENCOUNTER — APPOINTMENT (OUTPATIENT)
Dept: CARDIAC REHAB | Facility: HOSPITAL | Age: 84
End: 2019-02-08

## 2019-02-11 ENCOUNTER — APPOINTMENT (OUTPATIENT)
Dept: CARDIAC REHAB | Facility: HOSPITAL | Age: 84
End: 2019-02-11

## 2019-02-13 ENCOUNTER — APPOINTMENT (OUTPATIENT)
Dept: CARDIAC REHAB | Facility: HOSPITAL | Age: 84
End: 2019-02-13

## 2019-02-15 ENCOUNTER — APPOINTMENT (OUTPATIENT)
Dept: CARDIAC REHAB | Facility: HOSPITAL | Age: 84
End: 2019-02-15

## 2019-02-18 ENCOUNTER — APPOINTMENT (OUTPATIENT)
Dept: CARDIAC REHAB | Facility: HOSPITAL | Age: 84
End: 2019-02-18

## 2019-02-20 ENCOUNTER — APPOINTMENT (OUTPATIENT)
Dept: CARDIAC REHAB | Facility: HOSPITAL | Age: 84
End: 2019-02-20

## 2019-02-22 ENCOUNTER — APPOINTMENT (OUTPATIENT)
Dept: CARDIAC REHAB | Facility: HOSPITAL | Age: 84
End: 2019-02-22

## 2019-02-25 ENCOUNTER — APPOINTMENT (OUTPATIENT)
Dept: CARDIAC REHAB | Facility: HOSPITAL | Age: 84
End: 2019-02-25

## 2019-02-27 ENCOUNTER — APPOINTMENT (OUTPATIENT)
Dept: CARDIAC REHAB | Facility: HOSPITAL | Age: 84
End: 2019-02-27

## 2019-03-01 ENCOUNTER — APPOINTMENT (OUTPATIENT)
Dept: CARDIAC REHAB | Facility: HOSPITAL | Age: 84
End: 2019-03-01

## 2019-03-04 ENCOUNTER — APPOINTMENT (OUTPATIENT)
Dept: CARDIAC REHAB | Facility: HOSPITAL | Age: 84
End: 2019-03-04

## 2019-03-06 ENCOUNTER — APPOINTMENT (OUTPATIENT)
Dept: CARDIAC REHAB | Facility: HOSPITAL | Age: 84
End: 2019-03-06

## 2019-03-08 ENCOUNTER — APPOINTMENT (OUTPATIENT)
Dept: CARDIAC REHAB | Facility: HOSPITAL | Age: 84
End: 2019-03-08

## 2019-03-11 ENCOUNTER — APPOINTMENT (OUTPATIENT)
Dept: CARDIAC REHAB | Facility: HOSPITAL | Age: 84
End: 2019-03-11

## 2019-03-13 ENCOUNTER — APPOINTMENT (OUTPATIENT)
Dept: CARDIAC REHAB | Facility: HOSPITAL | Age: 84
End: 2019-03-13

## 2019-03-15 ENCOUNTER — APPOINTMENT (OUTPATIENT)
Dept: CARDIAC REHAB | Facility: HOSPITAL | Age: 84
End: 2019-03-15

## 2019-03-18 ENCOUNTER — APPOINTMENT (OUTPATIENT)
Dept: CARDIAC REHAB | Facility: HOSPITAL | Age: 84
End: 2019-03-18

## 2019-03-20 ENCOUNTER — APPOINTMENT (OUTPATIENT)
Dept: CARDIAC REHAB | Facility: HOSPITAL | Age: 84
End: 2019-03-20

## 2019-03-22 ENCOUNTER — APPOINTMENT (OUTPATIENT)
Dept: CARDIAC REHAB | Facility: HOSPITAL | Age: 84
End: 2019-03-22

## 2019-03-25 ENCOUNTER — APPOINTMENT (OUTPATIENT)
Dept: CARDIAC REHAB | Facility: HOSPITAL | Age: 84
End: 2019-03-25

## 2019-03-27 ENCOUNTER — APPOINTMENT (OUTPATIENT)
Dept: CARDIAC REHAB | Facility: HOSPITAL | Age: 84
End: 2019-03-27

## 2019-03-29 ENCOUNTER — APPOINTMENT (OUTPATIENT)
Dept: CARDIAC REHAB | Facility: HOSPITAL | Age: 84
End: 2019-03-29

## 2020-01-10 ENCOUNTER — APPOINTMENT (OUTPATIENT)
Dept: GENERAL RADIOLOGY | Facility: HOSPITAL | Age: 85
End: 2020-01-10

## 2020-01-10 ENCOUNTER — HOSPITAL ENCOUNTER (INPATIENT)
Facility: HOSPITAL | Age: 85
LOS: 4 days | Discharge: HOME | End: 2020-01-14
Attending: EMERGENCY MEDICINE | Admitting: FAMILY MEDICINE

## 2020-01-10 ENCOUNTER — APPOINTMENT (OUTPATIENT)
Dept: CT IMAGING | Facility: HOSPITAL | Age: 85
End: 2020-01-10

## 2020-01-10 DIAGNOSIS — Z78.9 IMPAIRED MOBILITY AND ADLS: ICD-10-CM

## 2020-01-10 DIAGNOSIS — Z74.09 IMPAIRED MOBILITY AND ADLS: ICD-10-CM

## 2020-01-10 DIAGNOSIS — K81.0 ACUTE CHOLECYSTITIS: Primary | ICD-10-CM

## 2020-01-10 LAB
ALBUMIN SERPL-MCNC: 3.9 G/DL (ref 3.5–5.2)
ALBUMIN/GLOB SERPL: 1.1 G/DL
ALP SERPL-CCNC: 181 U/L (ref 39–117)
ALT SERPL W P-5'-P-CCNC: 15 U/L (ref 1–41)
ANION GAP SERPL CALCULATED.3IONS-SCNC: 14.6 MMOL/L (ref 5–15)
AST SERPL-CCNC: 26 U/L (ref 1–40)
BACTERIA UR QL AUTO: ABNORMAL /HPF
BASOPHILS # BLD AUTO: 0.06 10*3/MM3 (ref 0–0.2)
BASOPHILS NFR BLD AUTO: 0.2 % (ref 0–1.5)
BILIRUB SERPL-MCNC: 0.6 MG/DL (ref 0.2–1.2)
BILIRUB UR QL STRIP: NEGATIVE
BUN BLD-MCNC: 23 MG/DL (ref 8–23)
BUN/CREAT SERPL: 13.3 (ref 7–25)
CALCIUM SPEC-SCNC: 8.9 MG/DL (ref 8.6–10.5)
CHLORIDE SERPL-SCNC: 96 MMOL/L (ref 98–107)
CHOLEST SERPL-MCNC: 176 MG/DL (ref 0–200)
CLARITY UR: CLEAR
CO2 SERPL-SCNC: 24.4 MMOL/L (ref 22–29)
COLOR UR: YELLOW
CREAT BLD-MCNC: 1.73 MG/DL (ref 0.76–1.27)
D-LACTATE SERPL-SCNC: 2.2 MMOL/L (ref 0.5–2)
DEPRECATED RDW RBC AUTO: 39.1 FL (ref 37–54)
EOSINOPHIL # BLD AUTO: 0 10*3/MM3 (ref 0–0.4)
EOSINOPHIL NFR BLD AUTO: 0 % (ref 0.3–6.2)
ERYTHROCYTE [DISTWIDTH] IN BLOOD BY AUTOMATED COUNT: 11.6 % (ref 12.3–15.4)
GFR SERPL CREATININE-BSD FRML MDRD: 38 ML/MIN/1.73
GLOBULIN UR ELPH-MCNC: 3.7 GM/DL
GLUCOSE BLD-MCNC: 247 MG/DL (ref 65–99)
GLUCOSE BLDC GLUCOMTR-MCNC: 230 MG/DL (ref 70–130)
GLUCOSE UR STRIP-MCNC: ABNORMAL MG/DL
HBA1C MFR BLD: 7.6 % (ref 4.8–5.6)
HCT VFR BLD AUTO: 38.5 % (ref 37.5–51)
HDLC SERPL-MCNC: 67 MG/DL (ref 40–60)
HGB BLD-MCNC: 13.3 G/DL (ref 13–17.7)
HGB UR QL STRIP.AUTO: ABNORMAL
HOLD SPECIMEN: NORMAL
HOLD SPECIMEN: NORMAL
HYALINE CASTS UR QL AUTO: ABNORMAL /LPF
IMM GRANULOCYTES # BLD AUTO: 0.28 10*3/MM3 (ref 0–0.05)
IMM GRANULOCYTES NFR BLD AUTO: 0.9 % (ref 0–0.5)
INR PPP: 1.06 (ref 0.9–1.1)
KETONES UR QL STRIP: ABNORMAL
LDLC SERPL CALC-MCNC: 96 MG/DL (ref 0–100)
LDLC/HDLC SERPL: 1.43 {RATIO}
LEUKOCYTE ESTERASE UR QL STRIP.AUTO: NEGATIVE
LIPASE SERPL-CCNC: 8 U/L (ref 13–60)
LYMPHOCYTES # BLD AUTO: 1.16 10*3/MM3 (ref 0.7–3.1)
LYMPHOCYTES NFR BLD AUTO: 3.8 % (ref 19.6–45.3)
MAGNESIUM SERPL-MCNC: 2 MG/DL (ref 1.6–2.4)
MCH RBC QN AUTO: 32.1 PG (ref 26.6–33)
MCHC RBC AUTO-ENTMCNC: 34.5 G/DL (ref 31.5–35.7)
MCV RBC AUTO: 93 FL (ref 79–97)
MONOCYTES # BLD AUTO: 2.23 10*3/MM3 (ref 0.1–0.9)
MONOCYTES NFR BLD AUTO: 7.3 % (ref 5–12)
NEUTROPHILS # BLD AUTO: 26.69 10*3/MM3 (ref 1.7–7)
NEUTROPHILS NFR BLD AUTO: 87.8 % (ref 42.7–76)
NITRITE UR QL STRIP: NEGATIVE
NRBC BLD AUTO-RTO: 0 /100 WBC (ref 0–0.2)
PH UR STRIP.AUTO: 5.5 [PH] (ref 5–8)
PLATELET # BLD AUTO: 415 10*3/MM3 (ref 140–450)
PMV BLD AUTO: 10.6 FL (ref 6–12)
POTASSIUM BLD-SCNC: 4.7 MMOL/L (ref 3.5–5.2)
PROT SERPL-MCNC: 7.6 G/DL (ref 6–8.5)
PROT UR QL STRIP: ABNORMAL
PROTHROMBIN TIME: 14.1 SECONDS (ref 12–15.1)
RBC # BLD AUTO: 4.14 10*6/MM3 (ref 4.14–5.8)
RBC # UR: ABNORMAL /HPF
RBC MORPH BLD: NORMAL
REF LAB TEST METHOD: ABNORMAL
SMALL PLATELETS BLD QL SMEAR: ADEQUATE
SODIUM BLD-SCNC: 135 MMOL/L (ref 136–145)
SP GR UR STRIP: 1.02 (ref 1–1.03)
SQUAMOUS #/AREA URNS HPF: ABNORMAL /HPF
TRIGL SERPL-MCNC: 67 MG/DL (ref 0–150)
TROPONIN T SERPL-MCNC: 0.01 NG/ML (ref 0–0.03)
UROBILINOGEN UR QL STRIP: ABNORMAL
VLDLC SERPL-MCNC: 13.4 MG/DL
WBC MORPH BLD: NORMAL
WBC NRBC COR # BLD: 30.42 10*3/MM3 (ref 3.4–10.8)
WBC UR QL AUTO: ABNORMAL /HPF
WHOLE BLOOD HOLD SPECIMEN: NORMAL
WHOLE BLOOD HOLD SPECIMEN: NORMAL

## 2020-01-10 PROCEDURE — 85007 BL SMEAR W/DIFF WBC COUNT: CPT | Performed by: EMERGENCY MEDICINE

## 2020-01-10 PROCEDURE — 25010000002 MORPHINE PER 10 MG: Performed by: FAMILY MEDICINE

## 2020-01-10 PROCEDURE — 83690 ASSAY OF LIPASE: CPT | Performed by: EMERGENCY MEDICINE

## 2020-01-10 PROCEDURE — 74176 CT ABD & PELVIS W/O CONTRAST: CPT

## 2020-01-10 PROCEDURE — 82962 GLUCOSE BLOOD TEST: CPT

## 2020-01-10 PROCEDURE — 80061 LIPID PANEL: CPT | Performed by: FAMILY MEDICINE

## 2020-01-10 PROCEDURE — 85610 PROTHROMBIN TIME: CPT | Performed by: FAMILY MEDICINE

## 2020-01-10 PROCEDURE — 25010000002 MORPHINE PER 10 MG: Performed by: EMERGENCY MEDICINE

## 2020-01-10 PROCEDURE — 83605 ASSAY OF LACTIC ACID: CPT | Performed by: EMERGENCY MEDICINE

## 2020-01-10 PROCEDURE — 84484 ASSAY OF TROPONIN QUANT: CPT | Performed by: FAMILY MEDICINE

## 2020-01-10 PROCEDURE — 83735 ASSAY OF MAGNESIUM: CPT | Performed by: FAMILY MEDICINE

## 2020-01-10 PROCEDURE — 99222 1ST HOSP IP/OBS MODERATE 55: CPT | Performed by: FAMILY MEDICINE

## 2020-01-10 PROCEDURE — 71045 X-RAY EXAM CHEST 1 VIEW: CPT

## 2020-01-10 PROCEDURE — 93005 ELECTROCARDIOGRAM TRACING: CPT | Performed by: EMERGENCY MEDICINE

## 2020-01-10 PROCEDURE — 80053 COMPREHEN METABOLIC PANEL: CPT | Performed by: EMERGENCY MEDICINE

## 2020-01-10 PROCEDURE — 99284 EMERGENCY DEPT VISIT MOD MDM: CPT

## 2020-01-10 PROCEDURE — 25010000002 ONDANSETRON PER 1 MG: Performed by: EMERGENCY MEDICINE

## 2020-01-10 PROCEDURE — 81001 URINALYSIS AUTO W/SCOPE: CPT | Performed by: EMERGENCY MEDICINE

## 2020-01-10 PROCEDURE — 85025 COMPLETE CBC W/AUTO DIFF WBC: CPT | Performed by: EMERGENCY MEDICINE

## 2020-01-10 PROCEDURE — 83036 HEMOGLOBIN GLYCOSYLATED A1C: CPT | Performed by: FAMILY MEDICINE

## 2020-01-10 PROCEDURE — 25010000002 PIPERACILLIN SOD-TAZOBACTAM PER 1 G: Performed by: EMERGENCY MEDICINE

## 2020-01-10 RX ORDER — SODIUM CHLORIDE 0.9 % (FLUSH) 0.9 %
10 SYRINGE (ML) INJECTION EVERY 12 HOURS SCHEDULED
Status: DISCONTINUED | OUTPATIENT
Start: 2020-01-11 | End: 2020-01-14 | Stop reason: HOSPADM

## 2020-01-10 RX ORDER — ACETAMINOPHEN 160 MG/5ML
650 SOLUTION ORAL EVERY 4 HOURS PRN
Status: DISCONTINUED | OUTPATIENT
Start: 2020-01-10 | End: 2020-01-14 | Stop reason: HOSPADM

## 2020-01-10 RX ORDER — SODIUM CHLORIDE 0.9 % (FLUSH) 0.9 %
10 SYRINGE (ML) INJECTION AS NEEDED
Status: DISCONTINUED | OUTPATIENT
Start: 2020-01-10 | End: 2020-01-14 | Stop reason: HOSPADM

## 2020-01-10 RX ORDER — CHOLECALCIFEROL (VITAMIN D3) 125 MCG
5 CAPSULE ORAL NIGHTLY PRN
Status: DISCONTINUED | OUTPATIENT
Start: 2020-01-10 | End: 2020-01-14 | Stop reason: HOSPADM

## 2020-01-10 RX ORDER — SODIUM CHLORIDE 9 MG/ML
125 INJECTION, SOLUTION INTRAVENOUS CONTINUOUS
Status: DISCONTINUED | OUTPATIENT
Start: 2020-01-10 | End: 2020-01-13

## 2020-01-10 RX ORDER — SODIUM CHLORIDE, SODIUM LACTATE, POTASSIUM CHLORIDE, CALCIUM CHLORIDE 600; 310; 30; 20 MG/100ML; MG/100ML; MG/100ML; MG/100ML
100 INJECTION, SOLUTION INTRAVENOUS CONTINUOUS
Status: DISCONTINUED | OUTPATIENT
Start: 2020-01-11 | End: 2020-01-11

## 2020-01-10 RX ORDER — CARVEDILOL 25 MG/1
25 TABLET ORAL EVERY 12 HOURS SCHEDULED
Status: DISCONTINUED | OUTPATIENT
Start: 2020-01-11 | End: 2020-01-14 | Stop reason: HOSPADM

## 2020-01-10 RX ORDER — NICOTINE POLACRILEX 4 MG
1 LOZENGE BUCCAL
Status: DISCONTINUED | OUTPATIENT
Start: 2020-01-10 | End: 2020-01-14 | Stop reason: HOSPADM

## 2020-01-10 RX ORDER — MORPHINE SULFATE 4 MG/ML
4 INJECTION, SOLUTION INTRAMUSCULAR; INTRAVENOUS
Status: DISCONTINUED | OUTPATIENT
Start: 2020-01-10 | End: 2020-01-13

## 2020-01-10 RX ORDER — AMLODIPINE BESYLATE 5 MG/1
5 TABLET ORAL DAILY
Status: DISCONTINUED | OUTPATIENT
Start: 2020-01-11 | End: 2020-01-14

## 2020-01-10 RX ORDER — ACETAMINOPHEN 650 MG/1
650 SUPPOSITORY RECTAL EVERY 4 HOURS PRN
Status: DISCONTINUED | OUTPATIENT
Start: 2020-01-10 | End: 2020-01-14 | Stop reason: HOSPADM

## 2020-01-10 RX ORDER — MORPHINE SULFATE 4 MG/ML
4 INJECTION, SOLUTION INTRAMUSCULAR; INTRAVENOUS ONCE
Status: COMPLETED | OUTPATIENT
Start: 2020-01-10 | End: 2020-01-10

## 2020-01-10 RX ORDER — DEXTROSE MONOHYDRATE 25 G/50ML
25 INJECTION, SOLUTION INTRAVENOUS
Status: DISCONTINUED | OUTPATIENT
Start: 2020-01-10 | End: 2020-01-14 | Stop reason: HOSPADM

## 2020-01-10 RX ORDER — FAMOTIDINE 10 MG/ML
20 INJECTION, SOLUTION INTRAVENOUS DAILY
Status: DISCONTINUED | OUTPATIENT
Start: 2020-01-11 | End: 2020-01-14 | Stop reason: HOSPADM

## 2020-01-10 RX ORDER — ACETAMINOPHEN 325 MG/1
650 TABLET ORAL EVERY 4 HOURS PRN
Status: DISCONTINUED | OUTPATIENT
Start: 2020-01-10 | End: 2020-01-14 | Stop reason: HOSPADM

## 2020-01-10 RX ORDER — ONDANSETRON 2 MG/ML
4 INJECTION INTRAMUSCULAR; INTRAVENOUS EVERY 6 HOURS PRN
Status: DISCONTINUED | OUTPATIENT
Start: 2020-01-10 | End: 2020-01-14 | Stop reason: HOSPADM

## 2020-01-10 RX ORDER — BISACODYL 10 MG
10 SUPPOSITORY, RECTAL RECTAL DAILY PRN
Status: DISCONTINUED | OUTPATIENT
Start: 2020-01-10 | End: 2020-01-14 | Stop reason: HOSPADM

## 2020-01-10 RX ORDER — ONDANSETRON 2 MG/ML
4 INJECTION INTRAMUSCULAR; INTRAVENOUS ONCE
Status: COMPLETED | OUTPATIENT
Start: 2020-01-10 | End: 2020-01-10

## 2020-01-10 RX ORDER — CARVEDILOL 6.25 MG/1
TABLET ORAL
Status: DISCONTINUED
Start: 2020-01-10 | End: 2020-01-10 | Stop reason: WASHOUT

## 2020-01-10 RX ADMIN — MORPHINE SULFATE 4 MG: 4 INJECTION INTRAVENOUS at 19:35

## 2020-01-10 RX ADMIN — MORPHINE SULFATE 4 MG: 4 INJECTION INTRAVENOUS at 23:35

## 2020-01-10 RX ADMIN — SODIUM CHLORIDE, POTASSIUM CHLORIDE, SODIUM LACTATE AND CALCIUM CHLORIDE 100 ML/HR: 600; 310; 30; 20 INJECTION, SOLUTION INTRAVENOUS at 23:41

## 2020-01-10 RX ADMIN — MORPHINE SULFATE 4 MG: 4 INJECTION INTRAVENOUS at 20:55

## 2020-01-10 RX ADMIN — PIPERACILLIN SODIUM AND TAZOBACTAM SODIUM 3.38 G: 3; .375 INJECTION, POWDER, FOR SOLUTION INTRAVENOUS at 22:04

## 2020-01-10 RX ADMIN — CARVEDILOL 25 MG: 25 TABLET, FILM COATED ORAL at 23:33

## 2020-01-10 RX ADMIN — SODIUM CHLORIDE 125 ML/HR: 9 INJECTION, SOLUTION INTRAVENOUS at 21:58

## 2020-01-10 RX ADMIN — MELATONIN TAB 5 MG 5 MG: 5 TAB at 23:56

## 2020-01-10 RX ADMIN — ONDANSETRON 4 MG: 2 INJECTION INTRAMUSCULAR; INTRAVENOUS at 19:33

## 2020-01-10 RX ADMIN — HUMAN INSULIN 3 UNITS: 100 INJECTION, SOLUTION SUBCUTANEOUS at 23:56

## 2020-01-10 RX ADMIN — ONDANSETRON 4 MG: 2 INJECTION INTRAMUSCULAR; INTRAVENOUS at 20:53

## 2020-01-11 ENCOUNTER — APPOINTMENT (OUTPATIENT)
Dept: ULTRASOUND IMAGING | Facility: HOSPITAL | Age: 85
End: 2020-01-11

## 2020-01-11 PROBLEM — N17.9 SEPSIS WITH ACUTE RENAL FAILURE WITHOUT SEPTIC SHOCK (HCC): Status: ACTIVE | Noted: 2020-01-11

## 2020-01-11 PROBLEM — I49.5 SICK SINUS SYNDROME (HCC): Status: ACTIVE | Noted: 2020-01-11

## 2020-01-11 PROBLEM — E11.21 TYPE 2 DIABETES MELLITUS WITH NEPHROPATHY (HCC): Status: ACTIVE | Noted: 2020-01-11

## 2020-01-11 PROBLEM — A41.9 SEPSIS WITH ACUTE RENAL FAILURE WITHOUT SEPTIC SHOCK (HCC): Status: ACTIVE | Noted: 2020-01-11

## 2020-01-11 PROBLEM — N18.2 CHRONIC KIDNEY DISEASE, STAGE II (MILD): Status: ACTIVE | Noted: 2020-01-11

## 2020-01-11 PROBLEM — R65.20 SEPSIS WITH ACUTE RENAL FAILURE WITHOUT SEPTIC SHOCK (HCC): Status: ACTIVE | Noted: 2020-01-11

## 2020-01-11 LAB
ANION GAP SERPL CALCULATED.3IONS-SCNC: 13.6 MMOL/L (ref 5–15)
BASOPHILS # BLD AUTO: 0.04 10*3/MM3 (ref 0–0.2)
BASOPHILS NFR BLD AUTO: 0.2 % (ref 0–1.5)
BUN BLD-MCNC: 24 MG/DL (ref 8–23)
BUN/CREAT SERPL: 14.3 (ref 7–25)
CALCIUM SPEC-SCNC: 8.2 MG/DL (ref 8.6–10.5)
CHLORIDE SERPL-SCNC: 99 MMOL/L (ref 98–107)
CO2 SERPL-SCNC: 25.4 MMOL/L (ref 22–29)
CREAT BLD-MCNC: 1.68 MG/DL (ref 0.76–1.27)
D-LACTATE SERPL-SCNC: 1.9 MMOL/L (ref 0.5–2)
DEPRECATED RDW RBC AUTO: 40.7 FL (ref 37–54)
EOSINOPHIL # BLD AUTO: 0.01 10*3/MM3 (ref 0–0.4)
EOSINOPHIL NFR BLD AUTO: 0 % (ref 0.3–6.2)
ERYTHROCYTE [DISTWIDTH] IN BLOOD BY AUTOMATED COUNT: 11.6 % (ref 12.3–15.4)
GFR SERPL CREATININE-BSD FRML MDRD: 39 ML/MIN/1.73
GLUCOSE BLD-MCNC: 130 MG/DL (ref 65–99)
GLUCOSE BLDC GLUCOMTR-MCNC: 118 MG/DL (ref 70–130)
GLUCOSE BLDC GLUCOMTR-MCNC: 134 MG/DL (ref 70–130)
GLUCOSE BLDC GLUCOMTR-MCNC: 145 MG/DL (ref 70–130)
GLUCOSE BLDC GLUCOMTR-MCNC: 204 MG/DL (ref 70–130)
HCT VFR BLD AUTO: 37.9 % (ref 37.5–51)
HGB BLD-MCNC: 12.3 G/DL (ref 13–17.7)
HOLD SPECIMEN: NORMAL
IMM GRANULOCYTES # BLD AUTO: 0.19 10*3/MM3 (ref 0–0.05)
IMM GRANULOCYTES NFR BLD AUTO: 0.8 % (ref 0–0.5)
LYMPHOCYTES # BLD AUTO: 1.89 10*3/MM3 (ref 0.7–3.1)
LYMPHOCYTES NFR BLD AUTO: 7.5 % (ref 19.6–45.3)
MCH RBC QN AUTO: 31.1 PG (ref 26.6–33)
MCHC RBC AUTO-ENTMCNC: 32.5 G/DL (ref 31.5–35.7)
MCV RBC AUTO: 95.7 FL (ref 79–97)
MONOCYTES # BLD AUTO: 2.05 10*3/MM3 (ref 0.1–0.9)
MONOCYTES NFR BLD AUTO: 8.1 % (ref 5–12)
NEUTROPHILS # BLD AUTO: 21.05 10*3/MM3 (ref 1.7–7)
NEUTROPHILS NFR BLD AUTO: 83.4 % (ref 42.7–76)
NRBC BLD AUTO-RTO: 0 /100 WBC (ref 0–0.2)
PLATELET # BLD AUTO: 253 10*3/MM3 (ref 140–450)
PMV BLD AUTO: 11.5 FL (ref 6–12)
POTASSIUM BLD-SCNC: 4.5 MMOL/L (ref 3.5–5.2)
RBC # BLD AUTO: 3.96 10*6/MM3 (ref 4.14–5.8)
SODIUM BLD-SCNC: 138 MMOL/L (ref 136–145)
WBC NRBC COR # BLD: 25.23 10*3/MM3 (ref 3.4–10.8)

## 2020-01-11 PROCEDURE — 82962 GLUCOSE BLOOD TEST: CPT

## 2020-01-11 PROCEDURE — 99222 1ST HOSP IP/OBS MODERATE 55: CPT | Performed by: INTERNAL MEDICINE

## 2020-01-11 PROCEDURE — 76705 ECHO EXAM OF ABDOMEN: CPT

## 2020-01-11 PROCEDURE — 94799 UNLISTED PULMONARY SVC/PX: CPT

## 2020-01-11 PROCEDURE — 25010000002 MORPHINE PER 10 MG: Performed by: FAMILY MEDICINE

## 2020-01-11 PROCEDURE — 25010000002 ONDANSETRON PER 1 MG: Performed by: FAMILY MEDICINE

## 2020-01-11 PROCEDURE — 25010000002 HYDRALAZINE PER 20 MG: Performed by: FAMILY MEDICINE

## 2020-01-11 PROCEDURE — 85025 COMPLETE CBC W/AUTO DIFF WBC: CPT | Performed by: FAMILY MEDICINE

## 2020-01-11 PROCEDURE — 25010000002 ENOXAPARIN PER 10 MG: Performed by: FAMILY MEDICINE

## 2020-01-11 PROCEDURE — 63710000001 INSULIN REGULAR HUMAN PER 5 UNITS: Performed by: FAMILY MEDICINE

## 2020-01-11 PROCEDURE — 80048 BASIC METABOLIC PNL TOTAL CA: CPT | Performed by: FAMILY MEDICINE

## 2020-01-11 PROCEDURE — 25010000002 PIPERACILLIN SOD-TAZOBACTAM PER 1 G: Performed by: FAMILY MEDICINE

## 2020-01-11 PROCEDURE — 99222 1ST HOSP IP/OBS MODERATE 55: CPT | Performed by: SURGERY

## 2020-01-11 PROCEDURE — 99233 SBSQ HOSP IP/OBS HIGH 50: CPT | Performed by: INTERNAL MEDICINE

## 2020-01-11 PROCEDURE — 83605 ASSAY OF LACTIC ACID: CPT | Performed by: EMERGENCY MEDICINE

## 2020-01-11 RX ORDER — ASPIRIN 81 MG/1
81 TABLET ORAL DAILY
Status: DISCONTINUED | OUTPATIENT
Start: 2020-01-12 | End: 2020-01-14 | Stop reason: HOSPADM

## 2020-01-11 RX ORDER — SODIUM CHLORIDE 0.9 % (FLUSH) 0.9 %
10 SYRINGE (ML) INJECTION AS NEEDED
Status: CANCELLED | OUTPATIENT
Start: 2020-01-11

## 2020-01-11 RX ORDER — SODIUM CHLORIDE 0.9 % (FLUSH) 0.9 %
3 SYRINGE (ML) INJECTION EVERY 12 HOURS SCHEDULED
Status: CANCELLED | OUTPATIENT
Start: 2020-01-11

## 2020-01-11 RX ORDER — MULTIPLE VITAMINS W/ MINERALS TAB 9MG-400MCG
1 TAB ORAL DAILY
COMMUNITY

## 2020-01-11 RX ORDER — HYDRALAZINE HYDROCHLORIDE 20 MG/ML
20 INJECTION INTRAMUSCULAR; INTRAVENOUS EVERY 6 HOURS PRN
Status: DISCONTINUED | OUTPATIENT
Start: 2020-01-11 | End: 2020-01-14 | Stop reason: HOSPADM

## 2020-01-11 RX ADMIN — HUMAN INSULIN 3 UNITS: 100 INJECTION, SOLUTION SUBCUTANEOUS at 18:15

## 2020-01-11 RX ADMIN — ONDANSETRON 4 MG: 2 INJECTION INTRAMUSCULAR; INTRAVENOUS at 16:59

## 2020-01-11 RX ADMIN — MORPHINE SULFATE 4 MG: 4 INJECTION INTRAVENOUS at 05:46

## 2020-01-11 RX ADMIN — CARVEDILOL 25 MG: 25 TABLET, FILM COATED ORAL at 09:03

## 2020-01-11 RX ADMIN — PIPERACILLIN SODIUM AND TAZOBACTAM SODIUM 3.38 G: 3; .375 INJECTION, POWDER, FOR SOLUTION INTRAVENOUS at 11:59

## 2020-01-11 RX ADMIN — CARVEDILOL 25 MG: 25 TABLET, FILM COATED ORAL at 20:28

## 2020-01-11 RX ADMIN — MORPHINE SULFATE 4 MG: 4 INJECTION INTRAVENOUS at 20:28

## 2020-01-11 RX ADMIN — SODIUM CHLORIDE, PRESERVATIVE FREE 10 ML: 5 INJECTION INTRAVENOUS at 20:29

## 2020-01-11 RX ADMIN — SODIUM CHLORIDE 125 ML/HR: 9 INJECTION, SOLUTION INTRAVENOUS at 11:59

## 2020-01-11 RX ADMIN — SODIUM CHLORIDE 125 ML/HR: 9 INJECTION, SOLUTION INTRAVENOUS at 18:56

## 2020-01-11 RX ADMIN — SODIUM CHLORIDE, PRESERVATIVE FREE 10 ML: 5 INJECTION INTRAVENOUS at 09:04

## 2020-01-11 RX ADMIN — ONDANSETRON 4 MG: 2 INJECTION INTRAMUSCULAR; INTRAVENOUS at 05:46

## 2020-01-11 RX ADMIN — SODIUM CHLORIDE, PRESERVATIVE FREE 10 ML: 5 INJECTION INTRAVENOUS at 00:04

## 2020-01-11 RX ADMIN — AMLODIPINE BESYLATE 5 MG: 5 TABLET ORAL at 09:03

## 2020-01-11 RX ADMIN — SODIUM CHLORIDE 125 ML/HR: 9 INJECTION, SOLUTION INTRAVENOUS at 03:44

## 2020-01-11 RX ADMIN — FAMOTIDINE 20 MG: 10 INJECTION, SOLUTION INTRAVENOUS at 09:04

## 2020-01-11 RX ADMIN — MELATONIN TAB 5 MG 5 MG: 5 TAB at 20:28

## 2020-01-11 RX ADMIN — MORPHINE SULFATE 4 MG: 4 INJECTION INTRAVENOUS at 09:34

## 2020-01-11 RX ADMIN — PIPERACILLIN SODIUM AND TAZOBACTAM SODIUM 3.38 G: 3; .375 INJECTION, POWDER, FOR SOLUTION INTRAVENOUS at 20:28

## 2020-01-11 RX ADMIN — MORPHINE SULFATE 4 MG: 4 INJECTION INTRAVENOUS at 17:00

## 2020-01-11 RX ADMIN — PIPERACILLIN SODIUM AND TAZOBACTAM SODIUM 3.38 G: 3; .375 INJECTION, POWDER, FOR SOLUTION INTRAVENOUS at 03:43

## 2020-01-11 RX ADMIN — HYDRALAZINE HYDROCHLORIDE 20 MG: 20 INJECTION INTRAMUSCULAR; INTRAVENOUS at 03:35

## 2020-01-11 RX ADMIN — ACETAMINOPHEN 650 MG: 325 TABLET, FILM COATED ORAL at 09:03

## 2020-01-11 RX ADMIN — ENOXAPARIN SODIUM 40 MG: 40 INJECTION SUBCUTANEOUS at 05:47

## 2020-01-11 NOTE — PLAN OF CARE
Patient npo before midnight for possible procedure for acute cholecystitis , waiting for MD eisenberg to assess in the AM. Patient has RUQ pain, given morphine Q2 when requested/needed. Pt slept well throughout the night. Pt came from ED hypertensive. Po meds ordered and given. Continue POC.

## 2020-01-11 NOTE — ED NOTES
Per AOD at [facility], they have no beds available and do not have a pending admission list to place patient's name on.  She advised me to call back tomorrow.  Updated Dr. Concepcion, who requests to speak with Dr. Persaud.     Shanae Aguilar  01/10/20 [id number]

## 2020-01-11 NOTE — H&P
[facility]      Name:  Ba Singh   Age:  84 y.o.  Sex:  male  :  1935  MRN:  [id number]   Visit Number:  [id number]  Admission Date:  1/10/2020  Date Of Service:  01/10/20  Primary Care Physician:  Timmy Burrows MD at [facility] primary care; Dr Hicks cardiology     Chief Complaint: Abdominal pain        History Of Presenting Illness: The patient is an 84-year-old gentleman with past medical history of coronary artery disease on Brilinta, bradycardia post pacemaker placement, who presents to the emergency room today for abdominal pain.  He states he has a history of abdominal pain in the past which got better at home on its own.  The patient reports having nausea and vomiting with diarrhea for several days prior to evaluation.  He states that the vomiting and diarrhea have resolved but he has persistent nausea and now acute onset abdominal pain since earlier this morning.  He states the abdominal pain is epigastric/right upper quadrant, progressively worsening in severity, 10 out of 10 severe pain, persistent.  He has been given multiple doses of morphine in the ER without improvement.  His lab work showed white blood cell count was elevated at 30.  Lactic acid was elevated. Fortunately, his blood pressure was elevated as he hadn't been able to take all of his home medications. His T-max was 99.3, pulse 60s, respirations 13, blood pressure was elevated at 180s systolic. CT scan showed acute cholecystitis.  He reports trying an ice pack at home on his abdomen without improvement. He was started on IV normal saline at 125 cc per hour. He was also ordered zosyn. He was diagnosed with sepsis with acute cholecystitis without shock. Dr Baez was consulted for general surgery and hospitalist service called for admission.         Review Of Systems:     General ROS: Positive subjective fevers, without chills or loss of consciousness.  Complains of generalized weakness.   Psychological ROS: Denies any hallucinations and delusions.  Ophthalmic ROS: Denies any diplopia or transient loss of vision.  ENT ROS: Denies sore throat, nasal congestion or ear pain.   Allergy and Immunology ROS: Denies rash or itching.  Hematological and Lymphatic ROS: Denies neck swelling or easy bleeding.  Endocrine ROS: Denies any recent unintentional weight gain or loss.  Breast ROS: Denies any pain or swelling.  Respiratory ROS: Denies cough or shortness of breath.   Cardiovascular ROS: Denies chest pain or palpitations. No history of exertional chest pain.  Gastrointestinal ROS: Positive nausea and vomiting. Positive RUQ abdominal pain. Positive resolving prior to admission diarrhea.  Genito-Urinary ROS: Denies dysuria or hematuria.  Musculoskeletal ROS: Complains of chronic back pain. No muscle pain. No calf pain.   Neurological ROS: Denies any focal weakness. No loss of consciousness. Denies any numbness. Denies neck pain.   Dermatological ROS: Denies any redness or pruritis.       Past Medical History:reviewed today    Past Medical History:   Diagnosis Date   • Bradycardia    • Colon polyp    • Diabetes mellitus (CMS/HCC)    • Disease of thyroid gland    • History of stomach ulcers    • Hypertension    • Myocardial infarction        Past Surgical history:reviewed today    Past Surgical History:   Procedure Laterality Date   • CARDIAC CATHETERIZATION N/A 10/22/2017    Procedure: Coronary angiography;  Surgeon: Siddhartha Hicks MD;  Location: [facility] [place] INVASIVE LOCATION;  Service:    • CARDIAC CATHETERIZATION N/A 10/22/2017    Procedure: Left Heart Cath;  Surgeon: Siddhartha Hicks MD;  Location: [facility] [place] INVASIVE LOCATION;  Service:    • CARDIAC CATHETERIZATION N/A 10/22/2017    Procedure: Angioplasty-coronary;  Surgeon: Siddhartha Hicks MD;  Location: [facility] [place] INVASIVE LOCATION;  Service:    • ORTHOPEDIC SURGERY     • PACEMAKER IMPLANTATION     • OK RT/LT  HEART CATHETERS N/A 10/22/2017    Procedure: Percutaneous Coronary Intervention;  Surgeon: Siddhartha Hicks MD;  Location: [facility] CATH INVASIVE LOCATION;  Service: Cardiovascular   • ROTATOR CUFF REPAIR Left 2015   • STOMACH SURGERY      ulcer repair and vagus nerve cut       Social History:denies smoking, alcohol, drug use.   Pediatric History   Patient Guardian Status   • Not on file     Other Topics Concern   • Not on file   Social History Narrative   • Not on file       Family History:    Family History   Problem Relation Age of Onset   • Heart attack Mother    • Arrhythmia Mother         PACEMAKER   • Hyperlipidemia Mother    • Hypertension Mother    • Thyroid disease Mother    • Stroke Mother    • Obesity Mother        Allergies:      Patient has no known allergies.    Home Medications:    Prior to Admission Medications     Prescriptions Last Dose Informant Patient Reported? Taking?    amLODIPine (NORVASC) 5 MG tablet   No No    Take 1 tablet by mouth Daily.    aspirin 81 MG chewable tablet   No No    Chew 1 tablet Daily.    carvedilol (COREG) 25 MG tablet   No No    Take 1 tablet by mouth 2 (Two) Times a Day.    glipiZIDE (GLUCOTROL XL) 5 MG ER tablet   Yes No    Take 5 mg by mouth Daily.    nitroglycerin (NITROSTAT) 0.4 MG SL tablet   No No    Place 1 tablet under the tongue Every 5 (Five) Minutes As Needed for Chest Pain. Take no more than 3 doses in 15 minutes.    ticagrelor (BRILINTA) 90 MG tablet tablet   No No    Take 1 tablet by mouth 2 (Two) Times a Day.             ED Medications:    Medications   sodium chloride 0.9 % flush 10 mL (has no administration in time range)   sodium chloride 0.9 % infusion (125 mL/hr Intravenous New Bag 1/10/20 2158)   Morphine sulfate (PF) injection 4 mg (has no administration in time range)   Morphine sulfate (PF) injection 4 mg (4 mg Intravenous Given 1/10/20 1935)   ondansetron (ZOFRAN) injection 4 mg (4 mg Intravenous Given 1/10/20 1933)   Morphine sulfate (PF)  injection 4 mg (4 mg Intravenous Given 1/10/20 2055)   ondansetron (ZOFRAN) injection 4 mg (4 mg Intravenous Given 1/10/20 2053)   piperacillin-tazobactam (ZOSYN) 3.375 g in sodium chloride 0.9 % 100 mL IVPB (3.375 g Intravenous New Bag 1/10/20 2204)       Vital Signs:    Temp:  [97.4 °F (36.3 °C)-99.3 °F (37.4 °C)] 99.3 °F (37.4 °C)  Heart Rate:  [60-63] 63  Resp:  [16-18] 16  BP: (171-180)/(62-63) 171/62    No intake or output data in the 24 hours ending 01/10/20 2304        01/10/20  1822   Weight: 64.4 kg (142 lb)       Body mass index is 22.92 kg/m².    Physical Exam:      General Appearance:    Elderly man. Awake and Alert and cooperative, no acute distress, oriented x 3   Head:    Atraumatic and normocephalic, without obvious defect.   Eyes:            PERRLA, conjunctivae and sclerae normal, no icterus. No pallor. Extraocular movements are within normal limits.   Ears:    Ears appear intact with no abnormalities noted.   Throat:   No oral lesions, no thrush, oral mucosa moist.   Neck:   Supple, trachea midline, no thyromegaly   Back:     No kyphoscoliosis present. No tenderness to palpation,   range of motion normal.   Lungs:     Chest shape is normal. Breath sounds heard bilaterally equally.  No crackles or wheezing. No Pleural rub or bronchial breathing.      Heart:    Normal S1 and S2, positive systolic murmur, no gallop, no rub. No JVD   Abdomen:     Normal bowel sounds, no masses, no organomegaly. Soft        Positive right upper quadrant tender, non-distended, no guarding, positive right upper quadrant rebound    Extremities:   Moves all extremities well,trace  edema, no cyanosis, no             clubbing   Pulses:   Pulses palpable and equal bilaterally   Skin:   No bleeding, bruising or rash   Lymph nodes:   No palpable adenopathy   Neurologic:   Cranial nerves 2 - 12 grossly intact, sensation intact, Motor power is normal and equal bilaterally.       EKG:    Pacing/sinus 60   Labs:    Lab Results  (last 24 hours)     Procedure Component Value Units Date/Time    Lactic Acid, Plasma [[id number]]  (Abnormal) Collected:  01/10/20 1934    Specimen:  Blood Updated:  01/10/20 2118     Lactate 2.2 mmol/L     Lactic Acid, Reflex Timer (This will reflex a repeat order 3-3:15 hours after ordered.) [[id number]] Collected:  01/10/20 1934    Specimen:  Blood Updated:  01/10/20 2118    [place] Draw [[id number]] Collected:  01/10/20 1934    Specimen:  Blood Updated:  01/10/20 2045    Narrative:       The following orders were created for panel order [place] Draw.  Procedure                               Abnormality         Status                     ---------                               -----------         ------                     Light Blue Top[[id number]]                                   Final result               Green Top (Gel)[[id number]]                                  Final result               Lavender Top[[id number]]                                     Final result               Gold Top - SST[[id number]]                                   Final result               Green Top (No Gel)[[id number]]                               In process                   Please view results for these tests on the individual orders.    Light Blue Top [[id number]] Collected:  01/10/20 1934    Specimen:  Blood Updated:  01/10/20 2045     Extra Tube hold for add-on     Comment: Auto resulted       Green Top (Gel) [[id number]] Collected:  01/10/20 1934    Specimen:  Blood Updated:  01/10/20 2045     Extra Tube Hold for add-ons.     Comment: Auto resulted.       Lavender Top [[id number]] Collected:  01/10/20 1934    Specimen:  Blood Updated:  01/10/20 2045     Extra Tube hold for add-on     Comment: Auto resulted       Gold Top - SST [[id number]] Collected:  01/10/20 1934    Specimen:  Blood Updated:  01/10/20 2045     Extra Tube Hold for add-ons.     Comment: Auto resulted.       CBC & Differential [[id number]] Collected:  01/10/20 1934     Specimen:  Blood Updated:  01/10/20 2007    Narrative:       The following orders were created for panel order CBC & Differential.  Procedure                               Abnormality         Status                     ---------                               -----------         ------                     CBC Auto Differential[[id number]]        Abnormal            Final result                 Please view results for these tests on the individual orders.    CBC Auto Differential [[id number]]  (Abnormal) Collected:  01/10/20 1934    Specimen:  Blood Updated:  01/10/20 2007     WBC 30.42 10*3/mm3      RBC 4.14 10*6/mm3      Hemoglobin 13.3 g/dL      Hematocrit 38.5 %      MCV 93.0 fL      MCH 32.1 pg      MCHC 34.5 g/dL      RDW 11.6 %      RDW-SD 39.1 fl      MPV 10.6 fL      Platelets 415 10*3/mm3      Neutrophil % 87.8 %      Lymphocyte % 3.8 %      Monocyte % 7.3 %      Eosinophil % 0.0 %      Basophil % 0.2 %      Immature Grans % 0.9 %      Neutrophils, Absolute 26.69 10*3/mm3      Lymphocytes, Absolute 1.16 10*3/mm3      Monocytes, Absolute 2.23 10*3/mm3      Eosinophils, Absolute 0.00 10*3/mm3      Basophils, Absolute 0.06 10*3/mm3      Immature Grans, Absolute 0.28 10*3/mm3      nRBC 0.0 /100 WBC     Scan Slide [[id number]] Collected:  01/10/20 1934    Specimen:  Blood Updated:  01/10/20 2007     RBC Morphology Normal     WBC Morphology Normal     Platelet Estimate Adequate    Lipase [[id number]]  (Abnormal) Collected:  01/10/20 1934    Specimen:  Blood Updated:  01/10/20 2006     Lipase 8 U/L     Comprehensive Metabolic Panel [[id number]]  (Abnormal) Collected:  01/10/20 1934    Specimen:  Blood Updated:  01/10/20 2006     Glucose 247 mg/dL      Comment: Glucose >180, Hemoglobin A1C recommended.        BUN 23 mg/dL      Creatinine 1.73 mg/dL      Sodium 135 mmol/L      Potassium 4.7 mmol/L      Chloride 96 mmol/L      CO2 24.4 mmol/L      Calcium 8.9 mg/dL      Total Protein 7.6 g/dL      Albumin 3.90 g/dL       ALT (SGPT) 15 U/L      AST (SGOT) 26 U/L      Alkaline Phosphatase 181 U/L      Total Bilirubin 0.6 mg/dL      eGFR Non African Amer 38 mL/min/1.73      Globulin 3.7 gm/dL      A/G Ratio 1.1 g/dL      BUN/Creatinine Ratio 13.3     Anion Gap 14.6 mmol/L     Narrative:       GFR Normal >60  Chronic Kidney Disease <60  Kidney Failure <15      Urinalysis, Microscopic Only - Urine, Clean Catch [[id number]]  (Abnormal) Collected:  01/10/20 1929    Specimen:  Urine, Clean Catch Updated:  01/10/20 1955     RBC, UA 6-12 /HPF      WBC, UA 0-2 /HPF      Bacteria, UA Trace /HPF      Squamous Epithelial Cells, UA 0-2 /HPF      Hyaline Casts, UA None Seen /LPF      Methodology Manual Light Microscopy    Urinalysis With Microscopic If Indicated (No Culture) - Urine, Clean Catch [[id number]]  (Abnormal) Collected:  01/10/20 1929    Specimen:  Urine, Clean Catch Updated:  01/10/20 1954     Color, UA Yellow     Appearance, UA Clear     pH, UA 5.5     Specific Gravity, UA 1.022     Glucose,  mg/dL (Trace)     Ketones, UA Trace     Bilirubin, UA Negative     Blood, UA Trace     Protein, UA 30 mg/dL (1+)     Leuk Esterase, UA Negative     Nitrite, UA Negative     Urobilinogen, UA 0.2 E.U./dL    Green Top (No Gel) [[id number]] Collected:  01/10/20 1934    Specimen:  Blood Updated:  01/10/20 1940          Radiology:    Imaging Results (Last 72 Hours)     Procedure Component Value Units Date/Time    XR Chest 1 View [[id number]] Resulted:  01/10/20 2228     Updated:  01/10/20 2229    CT Abdomen Pelvis Without Contrast [[id number]] Collected:  01/10/20 2038     Updated:  01/10/20 2040    Narrative:       FINAL REPORT    TECHNIQUE:  Multiple contiguous transaxial slices through the abdomen and  pelvis were obtained without the intravenous administration of  contrast.    CLINICAL HISTORY:  ruq pain    FINDINGS:  There is no renal or ureteral stone or hydronephrosis. The  bladder is normal in contour.  There is bilateral lower  lobe  atelectasis.  The gallbladder is dilated with wall thickening  and stranding of the adjacent fat.  There are no stones by CT.  The liver, spleen, pancreas and adrenal glands appear normal.  The bowel gas pattern is nonobstructive.  There is scattered  diverticulosis without diverticulitis.  There is no evidence for  appendicitis.  The aorta and iliac arteries are calcified.  There are patchy areas of sclerosis and lucency throughout the  visualized bones which may represent a bone marrow replacement  process.  Recommend appropriate clinical follow-up.  Bone scan  may be helpful for further characterization.      Impression:       Findings worrisome for cholecystitis.  Recommend ultrasound and  hepatobiliary scan  nonspecific bony findings    Authenticated by Blanka Lemus MD on 01/10/2020 08:38:44 PM          Assessment:    Acute cholecystitis    1. Acute cholecystitis with sepsis, prior to admission, uncertain organism  2. Acute kidney injury, with dehydration, and #1 with sepsis  3. CAD  4. Bradycardia post pacemaker  5. Diabetes mellitus type 2 uncontrolled, noninsulin dependent  6. Hypothyroidism      Plan:    Admit to hospitalist service and Dr Baez will follow in consultation. Continue zosyn, iv fluid, antiemetics, and pain control. Keep him NPO for now, except for medications.   Continue coreg, but hold his aspirin and brilinta with possible need for surgery soon. Repeat labs in AM and adjust accordingly. I also restarted his home norvasc.     For diabetes, he will be placed on sliding scale insulin and fingerstick monitoring. Check A1c pending.     Preliminary chest xray showed chronic changes. Troponin was negative. Dr Luque, on call for Dr Hicks, will be consulted for preop clearance.   He normally doctors at the [facility], but there were no beds available.   Medication risks and benefits were discussed in detail. Patient reported being satisfied with current treatment plan.    Michelle PHAM  DO Cori  01/10/20  11:04 PM

## 2020-01-11 NOTE — CONSULTS
General Surgery Consult     Name:Ba Singh  Age: 84 y.o.  Gender: male  : 1935  MRN: [id number]  Visit Number: [id number]  Admit Date: 1/10/2020  Date of Service: 20    Patient Care Team:  Timmy Burrows MD as PCP - General (Family Medicine)  Fabiola, Siddhartha LEYVA MD as PCP - Claims Attributed    Reason for Consultation: Acute cholecystitis    Chief complaint : RUQ pain      History of Present Illness:     Ba Singh is a 84 y.o. male patient with past medical history significant for severe single-vessel coronary artery disease status post NSTEMI in 2017 which was treated with a single drug-eluting stent in the proximal left circumflex, now on chronic Brilinta therapy, sick sinus syndrome status post placement of [employer] dual-chamber pacemaker, hypertension, type 2 diabetes mellitus, CKD stage II, and prior peptic ulcer disease requiring surgery, who presented to this facility last evening complaining of the acute onset of right upper quadrant abdominal pain beginning Thursday at 4 PM.  His pain persisted throughout the evening on Thursday and continued into Friday.  He had reported nausea, vomiting, and diarrhea for several days prior to coming to the emergency department.  He reports having similar symptoms in the past but reports that they were never as severe as the current episode.  He reports that by the time he came to the emergency department he was experiencing 10 out of 10 pain in the right upper quadrant.  Multiple doses of morphine in the emergency department did not improve his pain.  He then underwent a full laboratory work-up which revealed a markedly elevated white blood cell count at 30,000.  His lactic acid was mildly elevated at 2.2.  He was hypertensive, and non-tachycardic.  He was started on IV fluids, and a CT scan of the abdomen pelvis was obtained.  This revealed a dilated gallbladder with associated wall thickening and stranding of the  adjacent fat.  No stones were identified by CT exam.  He was noted to have calcific disease of the aorta and iliac arteries.  Note was made of patchy sclerosis throughout the visualized bones with concern for representation of a bone marrow replacement process.  It was felt that this likely represented cholecystitis, however ultrasound and hepatobiliary scan were recommended.  The patient was subsequently admitted by the hospitalist service for further management.  Surgical consultation was requested.    The patient reports that his pain has improved since being admitted.  He denies further nausea or vomiting.  He has had no diarrhea.  He was seen by cardiology a short while ago, and it was felt from their standpoint, the patient could proceed with surgery without any additional cardiac testing.    Of note, the patient reports that he receives all of his medical care through the [facility] in [facility].  Apparently they were contacted regarding transfer the patient last evening in the emergency department.  [facility]  Upon further discussion with the patient today regarding the potential need for surgery, he reports that he is comfortable with proceeding locally.      Patient Active Problem List   Diagnosis   • NSTEMI (non-ST elevated myocardial infarction) (CMS/HCC)   • Essential hypertension   • Bradycardia   • SOB (shortness of breath)   • Coronary artery disease involving native coronary artery of native heart without angina pectoris   • Acute cholecystitis   • Sepsis with acute renal failure without septic shock (CMS/HCC)   • Sick sinus syndrome (CMS/HCC)   • Type 2 diabetes mellitus with nephropathy (CMS/HCC)   • Chronic kidney disease, stage II (mild)         Past Medical History:   Diagnosis Date   • Bradycardia    • Colon polyp    • Diabetes mellitus (CMS/HCC)    • Disease of thyroid gland    • History of stomach ulcers    • Hypertension    • Myocardial infarction  (CMS/[facility], located within [facility])        Past Surgical History:   Procedure Laterality Date   • CARDIAC CATHETERIZATION N/A 10/22/2017    Procedure: Coronary angiography;  Surgeon: Siddhartha Hicks MD;  Location: [facility] CATH INVASIVE LOCATION;  Service:    • CARDIAC CATHETERIZATION N/A 10/22/2017    Procedure: Left Heart Cath;  Surgeon: Siddhartha Hicks MD;  Location: [facility] CATH INVASIVE LOCATION;  Service:    • CARDIAC CATHETERIZATION N/A 10/22/2017    Procedure: Angioplasty-coronary;  Surgeon: Siddhartha Hicks MD;  Location: [facility] CATH INVASIVE LOCATION;  Service:    • ORTHOPEDIC SURGERY     • PACEMAKER IMPLANTATION     • TX RT/LT HEART CATHETERS N/A 10/22/2017    Procedure: Percutaneous Coronary Intervention;  Surgeon: Siddhartha Hicks MD;  Location: [facility] CATH INVASIVE LOCATION;  Service: Cardiovascular   • ROTATOR CUFF REPAIR Left 2015   • VAGOTOMY AND PYLOROPLASTY  1970    Ex-lap with ulcer repair (suspected Berto patch) with vagotomy and pyloroplasty for perforated peptic ulcer       Family History   Problem Relation Age of Onset   • Heart attack Mother    • Arrhythmia Mother         PACEMAKER   • Hyperlipidemia Mother    • Hypertension Mother    • Thyroid disease Mother    • Stroke Mother    • Obesity Mother        Social History     Socioeconomic History   • Marital status:      Spouse name: Not on file   • Number of children: Not on file   • Years of education: Not on file   • Highest education level: Not on file   Tobacco Use   • Smoking status: Former Smoker     Last attempt to quit: 1974     Years since quittin.0   • Smokeless tobacco: Never Used   Substance and Sexual Activity   • Alcohol use: No   • Drug use: No         Current Facility-Administered Medications:   •  acetaminophen (TYLENOL) tablet 650 mg, 650 mg, Oral, Q4H PRN, 650 mg at 20 0903 **OR** acetaminophen (TYLENOL) 160 MG/5ML solution 650 mg, 650 mg, Oral, Q4H PRN **OR** acetaminophen (TYLENOL) suppository 650 mg, 650 mg, Rectal, Q4H PRN,  Michelle Persaud DO  •  amLODIPine (NORVASC) tablet 5 mg, 5 mg, Oral, Daily, Michelle Persaud DO, 5 mg at 01/11/20 0903  •  [START ON 1/12/2020] aspirin EC tablet 81 mg, 81 mg, Oral, Daily, Alberto Rodriguez MD  •  bisacodyl (DULCOLAX) suppository 10 mg, 10 mg, Rectal, Daily PRN, Michelle Persaud DO  •  carvedilol (COREG) tablet 25 mg, 25 mg, Oral, Q12H, Michelle Persaud, DO, 25 mg at 01/11/20 0903  •  dextrose (D50W) 25 g/ 50mL Intravenous Solution 25 g, 25 g, Intravenous, Q15 Min PRN, Michelle Persaud DO  •  dextrose (GLUTOSE) oral gel 1 tube, 1 tube, Oral, Q15 Min PRN, Michelle Persaud DO  •  enoxaparin (LOVENOX) syringe 40 mg, 40 mg, Subcutaneous, Q24H, Michelle Persaud DO, 40 mg at 01/11/20 0547  •  famotidine (PEPCID) injection 20 mg, 20 mg, Intravenous, Daily, Michelle Persaud DO, 20 mg at 01/11/20 0904  •  glucagon (human recombinant) (GLUCAGEN DIAGNOSTIC) injection 1 mg, 1 mg, Subcutaneous, PRN, Michelle Persaud, DO  •  hydrALAZINE (APRESOLINE) injection 20 mg, 20 mg, Intravenous, Q6H PRN, Michelle Persaud DO, 20 mg at 01/11/20 0335  •  insulin regular (humuLIN R,novoLIN R) injection 0-7 Units, 0-7 Units, Subcutaneous, Q6H, Michelle Persaud DO, 3 Units at 01/10/20 2356  •  melatonin tablet 5 mg, 5 mg, Oral, Nightly PRN, Michelle Persaud, DO, 5 mg at 01/10/20 2356  •  Morphine sulfate (PF) injection 4 mg, 4 mg, Intravenous, Q2H PRN, Michelle Persaud DO, 4 mg at 01/11/20 0934  •  ondansetron (ZOFRAN) injection 4 mg, 4 mg, Intravenous, Q6H PRN, Michelle Persaud DO, 4 mg at 01/11/20 0546  •  Pharmacy to Dose enoxaparin (LOVENOX), , Does not apply, Continuous PRN, Michelle Persaud,   •  Pharmacy to Dose Zosyn, , Does not apply, Continuous PRN, Michelle Persaud DO  •  piperacillin-tazobactam (ZOSYN) 3.375 g in sodium chloride 0.9 % 100 mL IVPB, 3.375 g, Intravenous, Q8H, Michelle Persaud DO, 3.375 g at 01/11/20 1159  •  promethazine (PHENERGAN) 12.5 mg in sodium chloride 0.9 % 50 mL, 12.5 mg, Intravenous, Q6H PRN,  Michelle Persaud, DO  •  sodium chloride 0.9 % flush 10 mL, 10 mL, Intravenous, PRN, Michelle Persaud, DO  •  sodium chloride 0.9 % flush 10 mL, 10 mL, Intravenous, Q12H, Michelle Persaud, DO, 10 mL at 01/11/20 0904  •  sodium chloride 0.9 % flush 10 mL, 10 mL, Intravenous, PRN, Michelle Persaud, DO  •  sodium chloride 0.9 % infusion, 125 mL/hr, Intravenous, Continuous, Michelle Persaud, DO, Last Rate: 125 mL/hr at 01/11/20 1159, 125 mL/hr at 01/11/20 1159    Medications Prior to Admission   Medication Sig Dispense Refill Last Dose   • amLODIPine (NORVASC) 5 MG tablet Take 1 tablet by mouth Daily. 30 tablet 0 Past Week at Unknown time   • aspirin 81 MG chewable tablet Chew 1 tablet Daily. 30 tablet 0 1/10/2020 at Unknown time   • carvedilol (COREG) 25 MG tablet Take 1 tablet by mouth 2 (Two) Times a Day. 60 tablet 0 Past Week at Unknown time   • glipiZIDE (GLUCOTROL XL) 5 MG ER tablet Take 5 mg by mouth Daily.   Past Week at Unknown time   • Multiple Vitamins-Minerals (MULTIVITAMIN WITH MINERALS) tablet tablet Take 1 tablet by mouth Daily.      • nitroglycerin (NITROSTAT) 0.4 MG SL tablet Place 1 tablet under the tongue Every 5 (Five) Minutes As Needed for Chest Pain. Take no more than 3 doses in 15 minutes. 30 tablet 12 More than a month at Unknown time       No Known Allergies    Review of Systems   Constitutional: Negative.    HENT: Negative.    Eyes: Negative.    Respiratory: Negative.    Cardiovascular: Negative.    Gastrointestinal: Positive for abdominal pain, diarrhea, nausea and vomiting.   Endocrine: Negative.    Genitourinary: Negative.    Musculoskeletal: Negative.    Skin: Negative.    Allergic/Immunologic: Negative.    Neurological: Negative.    Hematological: Negative.    Psychiatric/Behavioral: Negative.        OBJECTIVE:     Vital Signs  Temp:  [97.4 °F (36.3 °C)-99.7 °F (37.6 °C)] 99 °F (37.2 °C)  Heart Rate:  [60-78] 61  Resp:  [13-18] 13  BP: (113-204)/(42-72) 152/56    No intake/output data  recorded.  I/O last 3 completed shifts:  In: 827 [I.V.:827]  Out: 100 [Urine:100]      Physical Exam:   General Appearance alert, appears stated age and cooperative  Head normocephalic, without obvious abnormality and atraumatic  Eyes lids and lashes normal, conjunctivae and sclerae normal, no icterus, no pallor, corneas clear and PERRLA  Lungs respirations regular, respirations even and respirations unlabored  Heart regular rhythm & normal rate  Abdomen soft, exquisite ttp in RUQ, mild ttp in epigastrium.    Extremities moves extremities well, no edema, no cyanosis and no redness  Lymph Nodes no palpable adenopathy  Neurologic Mental Status orientated to person, place, time and situation, Cranial Nerves cranial nerves 2 - 12 grossly intact as examined    Results Review:  I have reviewed the entirety of the patient's clinical lab results.  I have also personally reviewed the patient's imaging    Ct Abdomen Pelvis Without Contrast    Result Date: 1/10/2020  FINAL REPORT TECHNIQUE: Multiple contiguous transaxial slices through the abdomen and pelvis were obtained without the intravenous administration of contrast. CLINICAL HISTORY: ruq pain FINDINGS: There is no renal or ureteral stone or hydronephrosis. The bladder is normal in contour.  There is bilateral lower lobe atelectasis.  The gallbladder is dilated with wall thickening and stranding of the adjacent fat.  There are no stones by CT. The liver, spleen, pancreas and adrenal glands appear normal. The bowel gas pattern is nonobstructive.  There is scattered diverticulosis without diverticulitis.  There is no evidence for appendicitis.  The aorta and iliac arteries are calcified. There are patchy areas of sclerosis and lucency throughout the visualized bones which may represent a bone marrow replacement process.  Recommend appropriate clinical follow-up.  Bone scan may be helpful for further characterization.     Findings worrisome for cholecystitis.  Recommend  ultrasound and hepatobiliary scan  nonspecific bony findings Authenticated by Blanka Lemus MD on 01/10/2020 08:38:44 PM    Xr Chest 1 View    Result Date: 1/11/2020  PORTABLE CHEST    1/10/2020 10:28 PM  HISTORY: Preop.  COMPARISON: 2017.  FINDINGS: Normal heart size. Slight left basilar atelectasis. No pneumonia or edema. No effusion or pneumothorax. Dual-lead left pacer is stable. Surgical clips in the central upper abdomen.      No acute cardiopulmonary process .  This report was finalized on 1/11/2020 7:49 AM by Dr Mele Reeder DO.    Lab Results (last 72 hours)     Procedure Component Value Units Date/Time    POC Glucose Once [[id number]]  (Normal) Collected:  01/11/20 1125    Specimen:  Blood Updated:  01/11/20 1148     Glucose 118 mg/dL      Comment: Serial Number: [id number]:  [id number]       Basic Metabolic Panel [[id number]]  (Abnormal) Collected:  01/11/20 0435    Specimen:  Blood Updated:  01/11/20 0552     Glucose 130 mg/dL      BUN 24 mg/dL      Creatinine 1.68 mg/dL      Sodium 138 mmol/L      Potassium 4.5 mmol/L      Chloride 99 mmol/L      CO2 25.4 mmol/L      Calcium 8.2 mg/dL      eGFR Non African Amer 39 mL/min/1.73      BUN/Creatinine Ratio 14.3     Anion Gap 13.6 mmol/L     Narrative:       GFR Normal >60  Chronic Kidney Disease <60  Kidney Failure <15      POC Glucose Once [[id number]]  (Abnormal) Collected:  01/11/20 0534    Specimen:  Blood Updated:  01/11/20 0550     Glucose 134 mg/dL      Comment: Serial Number: [id number]:  [id number]       CBC Auto Differential [[id number]]  (Abnormal) Collected:  01/11/20 0435    Specimen:  Blood Updated:  01/11/20 0540     WBC 25.23 10*3/mm3      RBC 3.96 10*6/mm3      Hemoglobin 12.3 g/dL      Hematocrit 37.9 %      MCV 95.7 fL      MCH 31.1 pg      MCHC 32.5 g/dL      RDW 11.6 %      RDW-SD 40.7 fl      MPV 11.5 fL      Platelets 253 10*3/mm3      Neutrophil % 83.4 %      Lymphocyte % 7.5 %      Monocyte % 8.1 %      Eosinophil % 0.0 %       Basophil % 0.2 %      Immature Grans % 0.8 %      Neutrophils, Absolute 21.05 10*3/mm3      Lymphocytes, Absolute 1.89 10*3/mm3      Monocytes, Absolute 2.05 10*3/mm3      Eosinophils, Absolute 0.01 10*3/mm3      Basophils, Absolute 0.04 10*3/mm3      Immature Grans, Absolute 0.19 10*3/mm3      nRBC 0.0 /100 WBC     Lactic Acid, Reflex [[id number]]  (Normal) Collected:  01/11/20 0043    Specimen:  Blood Updated:  01/11/20 0109     Lactate 1.9 mmol/L     Lactic Acid, Reflex Timer (This will reflex a repeat order 3-3:15 hours after ordered.) [[id number]] Collected:  01/10/20 1934    Specimen:  Blood Updated:  01/11/20 0030     Extra Tube Hold for add-ons.     Comment: Auto resulted.       Hemoglobin A1c [[id number]]  (Abnormal) Collected:  01/10/20 1934    Specimen:  Blood Updated:  01/10/20 2355     Hemoglobin A1C 7.60 %     Narrative:       Hemoglobin A1C Ranges:    Increased Risk for Diabetes  5.7% to 6.4%  Diabetes                     >= 6.5%  Diabetic Goal                < 7.0%    Troponin [[id number]]  (Normal) Collected:  01/10/20 1934    Specimen:  Blood Updated:  01/10/20 2354     Troponin T 0.014 ng/mL     Narrative:       Troponin T Reference Range:  <= 0.03 ng/mL-   Negative for AMI  >0.03 ng/mL-     Abnormal for myocardial necrosis.  Clinicians would have to utilize clinical acumen, EKG, Troponin and serial changes to determine if it is an Acute Myocardial Infarction or myocardial injury due to an underlying chronic condition.       Results may be falsely decreased if patient taking Biotin.      Lipid Panel [[id number]]  (Abnormal) Collected:  01/10/20 1934    Specimen:  Blood Updated:  01/10/20 2352     Total Cholesterol 176 mg/dL      Triglycerides 67 mg/dL      HDL Cholesterol 67 mg/dL      LDL Cholesterol  96 mg/dL      VLDL Cholesterol 13.4 mg/dL      LDL/HDL Ratio 1.43    Narrative:       Cholesterol Reference Ranges  (U.S. Department of Health and Human Services ATP III Classifications)    Desirable           <200 mg/dL  Borderline High    200-239 mg/dL  High Risk          >240 mg/dL      Triglyceride Reference Ranges  (U.S. Department of Health and Human Services ATP III Classifications)    Normal           <150 mg/dL  Borderline High  150-199 mg/dL  High             200-499 mg/dL  Very High        >500 mg/dL    HDL Reference Ranges  (U.S. Department of Health and Human Services ATP III Classifcations)    Low     <40 mg/dl (major risk factor for CHD)  High    >60 mg/dl ('negative' risk factor for CHD)        LDL Reference Ranges  (U.S. Department of Health and Human Services ATP III Classifcations)    Optimal          <100 mg/dL  Near Optimal     100-129 mg/dL  Borderline High  130-159 mg/dL  High             160-189 mg/dL  Very High        >189 mg/dL    Magnesium [[id number]]  (Normal) Collected:  01/10/20 1934    Specimen:  Blood Updated:  01/10/20 2352     Magnesium 2.0 mg/dL     Protime-INR [[id number]]  (Normal) Collected:  01/10/20 1934    Specimen:  Blood Updated:  01/10/20 2340     Protime 14.1 Seconds      INR 1.06    Narrative:       Suggested INR therapeutic range for stable oral anticoagulant therapy:    Low Intensity therapy:   1.5-2.0  Moderate Intensity therapy:   2.0-3.0  High Intensity therapy:   2.5-4.0    POC Glucose Once [[id number]]  (Abnormal) Collected:  01/10/20 2320    Specimen:  Blood Updated:  01/10/20 2330     Glucose 230 mg/dL      Comment: Serial Number: [id number]:  [id number]       Lactic Acid, Plasma [[id number]]  (Abnormal) Collected:  01/10/20 1934    Specimen:  Blood Updated:  01/10/20 2118     Lactate 2.2 mmol/L     [place] Draw [[id number]] Collected:  01/10/20 1934    Specimen:  Blood Updated:  01/10/20 2045    Narrative:       The following orders were created for panel order [place] Draw.  Procedure                               Abnormality         Status                     ---------                               -----------         ------                     Light Blue  Top[[id number]]                                   Final result               Green Top (Gel)[[id number]]                                  Final result               Lavender Top[[id number]]                                     Final result               Gold Top - SST[[id number]]                                   Final result               Green Top (No Gel)[[id number]]                               In process                   Please view results for these tests on the individual orders.    Light Blue Top [[id number]] Collected:  01/10/20 1934    Specimen:  Blood Updated:  01/10/20 2045     Extra Tube hold for add-on     Comment: Auto resulted       Green Top (Gel) [[id number]] Collected:  01/10/20 1934    Specimen:  Blood Updated:  01/10/20 2045     Extra Tube Hold for add-ons.     Comment: Auto resulted.       Lavender Top [[id number]] Collected:  01/10/20 1934    Specimen:  Blood Updated:  01/10/20 2045     Extra Tube hold for add-on     Comment: Auto resulted       Gold Top - SST [[id number]] Collected:  01/10/20 1934    Specimen:  Blood Updated:  01/10/20 2045     Extra Tube Hold for add-ons.     Comment: Auto resulted.       CBC & Differential [[id number]] Collected:  01/10/20 1934    Specimen:  Blood Updated:  01/10/20 2007    Narrative:       The following orders were created for panel order CBC & Differential.  Procedure                               Abnormality         Status                     ---------                               -----------         ------                     CBC Auto Differential[[id number]]        Abnormal            Final result                 Please view results for these tests on the individual orders.    CBC Auto Differential [[id number]]  (Abnormal) Collected:  01/10/20 1934    Specimen:  Blood Updated:  01/10/20 2007     WBC 30.42 10*3/mm3      RBC 4.14 10*6/mm3      Hemoglobin 13.3 g/dL      Hematocrit 38.5 %      MCV 93.0 fL      MCH 32.1 pg      MCHC 34.5 g/dL      RDW 11.6 %       RDW-SD 39.1 fl      MPV 10.6 fL      Platelets 415 10*3/mm3      Neutrophil % 87.8 %      Lymphocyte % 3.8 %      Monocyte % 7.3 %      Eosinophil % 0.0 %      Basophil % 0.2 %      Immature Grans % 0.9 %      Neutrophils, Absolute 26.69 10*3/mm3      Lymphocytes, Absolute 1.16 10*3/mm3      Monocytes, Absolute 2.23 10*3/mm3      Eosinophils, Absolute 0.00 10*3/mm3      Basophils, Absolute 0.06 10*3/mm3      Immature Grans, Absolute 0.28 10*3/mm3      nRBC 0.0 /100 WBC     Scan Slide [[id number]] Collected:  01/10/20 1934    Specimen:  Blood Updated:  01/10/20 2007     RBC Morphology Normal     WBC Morphology Normal     Platelet Estimate Adequate    Lipase [[id number]]  (Abnormal) Collected:  01/10/20 1934    Specimen:  Blood Updated:  01/10/20 2006     Lipase 8 U/L     Comprehensive Metabolic Panel [[id number]]  (Abnormal) Collected:  01/10/20 1934    Specimen:  Blood Updated:  01/10/20 2006     Glucose 247 mg/dL      Comment: Glucose >180, Hemoglobin A1C recommended.        BUN 23 mg/dL      Creatinine 1.73 mg/dL      Sodium 135 mmol/L      Potassium 4.7 mmol/L      Chloride 96 mmol/L      CO2 24.4 mmol/L      Calcium 8.9 mg/dL      Total Protein 7.6 g/dL      Albumin 3.90 g/dL      ALT (SGPT) 15 U/L      AST (SGOT) 26 U/L      Alkaline Phosphatase 181 U/L      Total Bilirubin 0.6 mg/dL      eGFR Non African Amer 38 mL/min/1.73      Globulin 3.7 gm/dL      A/G Ratio 1.1 g/dL      BUN/Creatinine Ratio 13.3     Anion Gap 14.6 mmol/L     Narrative:       GFR Normal >60  Chronic Kidney Disease <60  Kidney Failure <15      Urinalysis, Microscopic Only - Urine, Clean Catch [[id number]]  (Abnormal) Collected:  01/10/20 1929    Specimen:  Urine, Clean Catch Updated:  01/10/20 1955     RBC, UA 6-12 /HPF      WBC, UA 0-2 /HPF      Bacteria, UA Trace /HPF      Squamous Epithelial Cells, UA 0-2 /HPF      Hyaline Casts, UA None Seen /LPF      Methodology Manual Light Microscopy    Urinalysis With Microscopic If Indicated (No  Culture) - Urine, Clean Catch [[id number]]  (Abnormal) Collected:  01/10/20 1929    Specimen:  Urine, Clean Catch Updated:  01/10/20 1954     Color, UA Yellow     Appearance, UA Clear     pH, UA 5.5     Specific Gravity, UA 1.022     Glucose,  mg/dL (Trace)     Ketones, UA Trace     Bilirubin, UA Negative     Blood, UA Trace     Protein, UA 30 mg/dL (1+)     Leuk Esterase, UA Negative     Nitrite, UA Negative     Urobilinogen, UA 0.2 E.U./dL    Green Top (No Gel) [[id number]] Collected:  01/10/20 1934    Specimen:  Blood Updated:  01/10/20 1940                          ASSESSMENT/PLAN:      Acute cholecystitis    Essential hypertension    Coronary artery disease involving native coronary artery of native heart without angina pectoris    Sepsis with acute renal failure without septic shock (CMS/HCC)    Sick sinus syndrome (CMS/HCC)    Type 2 diabetes mellitus with nephropathy (CMS/HCC)    Chronic kidney disease, stage II (mild)    Mr. Perkins is an 85 yo male with a past medical history as detailed above, currently admitted with apparent acute cholecystitis.  Fortunately, it was felt by cardiology that he could proceed with cholecystectomy without additional cardiac testing or intervention.  Appreciate their evaluation recommendations.  Agree with continuing beta-blockade and low-dose aspirin.  Based on my review of the patient CT scan, I would like to get a right upper quadrant ultrasound today to better delineate anatomy of the gallbladder and the biliary tree.  This is somewhat difficult to fully evaluate on CT scan.  Additionally, I would like to evaluate for the presence or absence of stones.  I have ordered a stat right upper quadrant ultrasound.  I discussed with patient that he will in fact need to undergo cholecystectomy for definitive management of his issue.  I would like to proceed laparoscopically with plans for an intraoperative cholangiogram.  However, his prior laparotomy for perforated peptic  ulcer certainly increases his risk for conversion to an open procedure, as does the severity of his cholecystitis.  For this reason, I would like to give him 1 additional day of IV antibiotic therapy before proceeding.  I am tentatively planning to proceed with surgery tomorrow, 1/12/2020.  For today, he may be started on a clear liquid diet and will be n.p.o. after midnight.  I did discuss with the patient the risks, benefits, and alternatives of surgical procedure.  He understands that he is fairly high risk for conversion to an open procedure.  He also understands the perioperative cardiac risk posed by his prior history.  We also discussed the usual risks including postoperative infection, bleeding, bile leak, common bile duct injury, need for additional procedures, and the risk generally related to anesthesia.  He understands these and verbalizes his willingness to proceed.  From my standpoint, he does not require further ICU care and may be transferred to the floor.    I discussed the patients findings and my recommendations with patient.    Perla Baez MD  01/11/20  2:06 PM

## 2020-01-11 NOTE — PLAN OF CARE
Patient was cleared by Cardiology today for a lap cholycystectomy tomorrow.  Pain and nausea are both controlled with prn meds. VSS. Will continue to monitor.

## 2020-01-11 NOTE — PHARMACY RECOMMENDATION
"Pharmacy to dose Piperacillin-tazobactam    Ba Singh is a 84 y.o. male  167.6 cm (66\") 64.4 kg (142 lb)    Indication for use: intra-abdominal infection    Results from last 7 days   Lab Units 01/10/20  1934   WBC 10*3/mm3 30.42*   CREATININE mg/dL 1.73*      Estimated Creatinine Clearance: 30 mL/min (A) (by C-G formula based on SCr of 1.73 mg/dL (H)).  Temp Readings from Last 1 Encounters:   01/11/20 98.1 °F (36.7 °C) (Oral)       Culture results  Microbiology Results (last 10 days)       ** No results found for the last 240 hours. **            Other Antimicrobials  N/A    Assessment/Plan  Piperacillin-tazobactam 3.375 g IVPB every 8 hours, extended infusion.  Pharmacy will monitor renal function and adjust dose accordingly.    Khloe Bruner RPH  01/11/20 12:50 AM    "

## 2020-01-11 NOTE — PHARMACY RECOMMENDATION
"Pharmacy to dose Enoxaparin    Pharmacy consult to dose enoxaparin for prophylaxis venous thromboembolism in   Ba Singh, 84 year old male     [Ht: 167.6 cm (66\"); Wt: 64.4 kg (142 lb)]  Body mass index is 22.92 kg/m².    Estimated Creatinine Clearance: 30 mL/min (A) (by C-G formula based on SCr of 1.73 mg/dL (H)).    Results from last 7 days   Lab Units 01/10/20  1934   HEMOGLOBIN g/dL 13.3   HEMATOCRIT % 38.5   PLATELETS 10*3/mm3 415   CREATININE mg/dL 1.73*       Enoxaparin 40 mg subcutaneously once every 24 hours for VTE prophylaxis is appropriate.      Khloe Bruner, Pharm D  1/11/2020          01:06 AM    "

## 2020-01-11 NOTE — PROGRESS NOTES
Discharge Planning Assessment   [facility]     Patient Name: Ba Singh  MRN: [id number]  Today's Date: 1/11/2020    Admit Date: 1/10/2020    Discharge Needs Assessment     Row Name 01/11/20 1503       Living Environment    Lives With  spouse    Name(s) of Who Lives With Patient  Marisol Singh, [phone] and [phone]     Current Living Arrangements  home/apartment/condo    Duration at Residence  49 years      Primary Care Provided by  self    Provides Primary Care For  no one    Family Caregiver if Needed  spouse    Quality of Family Relationships  supportive    Able to Return to Prior Arrangements  yes       Resource/Environmental Concerns    Resource/Environmental Concerns  none       Transition Planning    Transportation Anticipated  family or friend will provide       Discharge Needs Assessment    Readmission Within the Last 30 Days  no previous admission in last 30 days    Equipment Currently Used at Home  glucometer        Discharge Plan     Row Name 01/11/20 1506       Plan    Plan  Spoke with pt about discharge plans  Confirmed current address and contacts  Marisol Singh, spouse [phone] and [phone]   No POA  Pt states he can perform ADL good   PCP Dr Timmy Burrows  DME  glucometer  Pt states he used HH services 50 years ago and thinks it was [facility]   Will continue to assess pt for any further needs prior to being discharged        Destination      Coordination has not been started for this encounter.      Durable Medical Equipment      Coordination has not been started for this encounter.      Dialysis/Infusion      Coordination has not been started for this encounter.      Home Medical Care      Coordination has not been started for this encounter.      Therapy      Coordination has not been started for this encounter.      Community Resources      Coordination has not been started for this encounter.        Expected Discharge Date and Time     Expected Discharge Date Expected  Discharge Time    Jan 14, 2020         Demographic Summary     Row Name 01/11/20 1501       General Information    Admission Type  inpatient    Arrived From  emergency department    Referral Source  admission list    Reason for Consult  discharge planning    Preferred Language  English       Contact Information    Permission Granted to Share Info With      Contact Information Obtained for          Functional Status     Row Name 01/11/20 1502       Functional Status    Usual Activity Tolerance  good       Functional Status, IADL    Medications  independent    Meal Preparation  independent    Housekeeping  independent    Laundry  independent    Shopping  independent    IADL Comments  Pt can perform ADL good       Employment/    Employment Status  retired        Psychosocial    No documentation.       Abuse/Neglect    No documentation.       Legal    No documentation.       Substance Abuse    No documentation.       Patient Forms    No documentation.           Meredith Cotter RN

## 2020-01-11 NOTE — PROGRESS NOTES
[facility]    Name:  Ba Singh   Age:  84 y.o.  Sex:  male  :  1935  MRN:  [id number]   Visit Number:  [id number]  Admission Date:  1/10/2020  Date Of Service:  20  Primary Care Physician:  Timmy Burrows MD     LOS: 1 day :  Patient Care Team:  Timmy Burrows MD as PCP - General (Family Medicine)  Siddhartha Hicks MD as PCP - Claims Attributed:    Chief Complaint:      Right upper quadrant and epigastric pain.    Subjective / Interval History:     Mr. Singh is currently lying down on the bed and is comfortable except for his right upper quadrant pain.  He was admitted from the emergency room last night after his wife brought him with abdominal pain symptoms.  He was noted to have acute cholecystitis on CT of the abdomen associated with elevated white count of 30,000.  Patient was given IV fluids and was started on IV antibiotic therapy with Zosyn.  A consult was called from surgical services.  He was subsequently admitted to the ICU as he had mild hypotension on presentation.  Patient does have history of coronary artery disease and had a stent in the past.  He was seen by Dr. Luque this morning for preoperative clearance and he has cleared him for surgery.  He did have a low-grade temperature of 99.7 through the night but otherwise has been hemodynamically stable.  Previous physician documentation, laboratory and imaging data have been reviewed.    Review of Systems:     General ROS: Patient denies any fevers, chills or loss of consciousness.  Generalized weakness.  Respiratory ROS: Denies cough or shortness of breath.  Cardiovascular ROS: Denies chest pain or palpitations. No history of exertional chest pain.  Gastrointestinal ROS: Abdominal pain and nausea.  Neurological ROS: Denies any focal weakness. No loss of consciousness. Denies any numbness.  Dermatological ROS: Denies any redness or pruritis.    Vital  Signs:    Temp:  [97.4 °F (36.3 °C)-99.7 °F (37.6 °C)] 99 °F (37.2 °C)  Heart Rate:  [60-78] 61  Resp:  [13-18] 13  BP: (113-204)/(42-72) 152/56    Intake and output:    I/O last 3 completed shifts:  In: 827 [I.V.:827]  Out: 100 [Urine:100]  No intake/output data recorded.    Physical Examination:    General Appearance:  Alert and cooperative, not in any acute distress.   Head:  Atraumatic and normocephalic, without obvious abnormality.   Eyes:          PERRLA, conjunctivae and sclerae normal, no Icterus. No pallor. Extraocular movements are within normal limits.   Neck: Supple, trachea midline, no thyromegaly, no carotid bruit.   Lungs:   Chest shape is normal. Breath sounds heard bilaterally equally.  No crackles or wheezing. No Pleural rub or bronchial breathing.   Heart:  Normal S1 and S2, no murmur, no gallop, no rub. No JVD.  Pacemaker is palpated on the left upper chest.   Abdomen:   Normal bowel sounds, no masses, no organomegaly. Soft, tenderness noted in the right upper quadrant and epigastric region on minimal palpation, non-distended, minimal guarding, no rebound tenderness.   Extremities: Moves all extremities well, no edema, no cyanosis, no clubbing.   Skin: No bleeding, bruising or rash.   Neurologic: Awake, alert and oriented times 3. Moves all 4 extremities equally.     Laboratory results:    Results from last 7 days   Lab Units 01/11/20  0435 01/10/20  1934   SODIUM mmol/L 138 135*   POTASSIUM mmol/L 4.5 4.7   CHLORIDE mmol/L 99 96*   CO2 mmol/L 25.4 24.4   BUN mg/dL 24* 23   CREATININE mg/dL 1.68* 1.73*   CALCIUM mg/dL 8.2* 8.9   BILIRUBIN mg/dL  --  0.6   ALK PHOS U/L  --  181*   ALT (SGPT) U/L  --  15   AST (SGOT) U/L  --  26   GLUCOSE mg/dL 130* 247*     Results from last 7 days   Lab Units 01/11/20  0435 01/10/20  1934   WBC 10*3/mm3 25.23* 30.42*   HEMOGLOBIN g/dL 12.3* 13.3   HEMATOCRIT % 37.9 38.5   PLATELETS 10*3/mm3 253 415     Results from last 7 days   Lab Units 01/10/20  1934   INR   1.06     Results from last 7 days   Lab Units 01/10/20  1934   TROPONIN T ng/mL 0.014           I have reviewed the patient's laboratory results.    Radiology results:    Imaging Results (Last 24 Hours)     Procedure Component Value Units Date/Time    XR Chest 1 View [[id number]] Collected:  01/11/20 0748     Updated:  01/11/20 0751    Narrative:       PORTABLE CHEST    1/10/2020 10:28 PM      HISTORY: Preop.     COMPARISON: 2017.     FINDINGS: Normal heart size. Slight left basilar atelectasis. No  pneumonia or edema. No effusion or pneumothorax. Dual-lead left pacer is  stable. Surgical clips in the central upper abdomen.       Impression:       No acute cardiopulmonary process .     This report was finalized on 1/11/2020 7:49 AM by Dr Mele Reeder DO.    CT Abdomen Pelvis Without Contrast [[id number]] Collected:  01/10/20 2038     Updated:  01/10/20 2040    Narrative:       FINAL REPORT    TECHNIQUE:  Multiple contiguous transaxial slices through the abdomen and  pelvis were obtained without the intravenous administration of  contrast.    CLINICAL HISTORY:  ruq pain    FINDINGS:  There is no renal or ureteral stone or hydronephrosis. The  bladder is normal in contour.  There is bilateral lower lobe  atelectasis.  The gallbladder is dilated with wall thickening  and stranding of the adjacent fat.  There are no stones by CT.  The liver, spleen, pancreas and adrenal glands appear normal.  The bowel gas pattern is nonobstructive.  There is scattered  diverticulosis without diverticulitis.  There is no evidence for  appendicitis.  The aorta and iliac arteries are calcified.  There are patchy areas of sclerosis and lucency throughout the  visualized bones which may represent a bone marrow replacement  process.  Recommend appropriate clinical follow-up.  Bone scan  may be helpful for further characterization.      Impression:       Findings worrisome for cholecystitis.  Recommend ultrasound and  hepatobiliary scan   nonspecific bony findings    Authenticated by Blanka Lemus MD on 01/10/2020 08:38:44 PM        I have reviewed the patient's radiology reports.    Medication Review:     I have reviewed the patients active and prn medications.       Acute cholecystitis    Assessment:    1.  Sepsis secondary to acute cholecystitis, present on admission.  2.  Acute renal failure, present on admission secondary to sepsis.  3.  Coronary artery disease status post stent in the past.  4.  Sick sinus syndrome status post pacemaker.  5.  Diabetes mellitus type 2 with nephropathy.  6.  Chronic kidney disease stage II.  7.  Acquired hypothyroidism.    Plan:    Mr. Smith is currently hemodynamically stable and awaiting gallbladder surgery.  He will be continued on IV fluids and IV antibiotic therapy with Zosyn.  He will also be placed on lactobacillus supplements.  He was seen by Dr. Luque from cardiology this morning for preoperative clearance.  Patient does not have any chest pain and has had good effort tolerance.  He has cleared the patient for surgery.  We will continue carvedilol and aspirin therapy at this time.    Patient will be continued on subcutaneous insulin protocol.  He does have acute on chronic kidney disease which should be improving with IV hydration.  He will be continued on Lovenox for DVT prophylaxis.  Further recommendations depend upon his clinical course.    Alberto Rodriguez MD  01/11/20  12:19 PM    Dictated utilizing Dragon dictation.

## 2020-01-11 NOTE — CONSULTS
[facility] Cardiology Consult Note    Ba Singh  1935  [id number]  01/11/20    Requesting Physician: Sahil Concepcion DO    Chief Complaint: Abdominal pain    History of Present Illness:   Mr. Ba Singh is a 84 y.o. male who is being seen by Cardiology for reoperative cardiac risk assessment.  The patient has a past medical history significant for hypertension and type 2 diabetes mellitus.  His past cardiac history significant for an NSTEMI in 10/17, for which the patient underwent coronary angiography revealing severe one-vessel disease as the culprit lesion.  He had placement of 1 drug-eluting stent in the proximal LCx at that time.  He also has a cardiac history significant for bradycardia, with placement of a [employer] dual-chamber pacemaker.  The patient follows with Dr. Hicks in the cardiology clinic.  He presented to the [facility] emergency department yesterday for evaluation abdominal pain.  He reports developing abdominal pain on approximately Thursday, with acute onset.  The pain is located in his epigastric area and right upper quadrant.  He does note associated nausea/vomiting as well as diarrhea.  Due to his persistent abdominal pain, he presented to the emergency department for evaluation.  Upon initial evaluation, the patient was found to have a significant leukocytosis.  A subsequent CT scan revealed acute cholecystitis as etiology for his abdominal pain and leukocytosis.  He was subsequently admitted to the ICU and started on antibiotic therapy.  Surgery has been consulted, for consideration of cholecystectomy.  Cardiology has been consulted for preoperative cardiac risk assessment.  On further questioning, the patient denies any recent or recurrent episodes of chest pain or exertional chest discomfort.  He denies exertional dyspnea.  No episodes of dizziness, lightheadedness, or syncope.  He denies orthopnea, PND, or significant lower  extremity edema.  He reports his initial anginal symptom at the time of his NSTEMI in 2017 was chest discomfort, and he has not had any recurrent episodes since undergoing PCI.  Other than abdominal pain, he has no other complaints at this time.    Prior Cardiac Testin. Last Coronary Angio: 10/22/2017    1.  Severe one-vessel coronary artery disease involving the proximal LCx   2.  Successful revascularization of the proximal LCx with placement of 1 LINDSEY  2. Prior Stress Testing: None  3. Last Echo: 10/23/2017   1.  Normal left ventricular systolic function, LVEF 56%   2.  Grade 1 diastolic dysfunction   3.  Borderline left atrial dilation.   4.  Mild calcification of the aortic valve without significant stenosis   5.  Mild aortic regurgitation   6.  Mild mitral regurgitation  4. Prior Holter Monitor: None    Review of Systems:   Review of Systems   Constitutional: Positive for appetite change. Negative for activity change, chills, diaphoresis, fatigue, fever, unexpected weight gain and unexpected weight loss.   Respiratory: Negative for cough, chest tightness, shortness of breath and wheezing.    Cardiovascular: Negative for chest pain, palpitations and leg swelling.   Gastrointestinal: Positive for abdominal pain, diarrhea and nausea. Negative for anal bleeding, blood in stool and GERD.   Endocrine: Negative for cold intolerance and heat intolerance.   Genitourinary: Negative for hematuria.   Neurological: Negative for dizziness, syncope, weakness and light-headedness.   Hematological: Does not bruise/bleed easily.   Psychiatric/Behavioral: Negative for depressed mood and stress. The patient is not nervous/anxious.        Past Medical History:   Past Medical History:   Diagnosis Date   • Bradycardia    • Colon polyp    • Diabetes mellitus (CMS/HCC)    • Disease of thyroid gland    • History of stomach ulcers    • Hypertension    • Myocardial infarction        Past Surgical History:   Past Surgical History:    Procedure Laterality Date   • CARDIAC CATHETERIZATION N/A 10/22/2017    Procedure: Coronary angiography;  Surgeon: Siddhartha Hicks MD;  Location: [facility] CATH INVASIVE LOCATION;  Service:    • CARDIAC CATHETERIZATION N/A 10/22/2017    Procedure: Left Heart Cath;  Surgeon: Siddhartha Hicks MD;  Location: [facility] CATH INVASIVE LOCATION;  Service:    • CARDIAC CATHETERIZATION N/A 10/22/2017    Procedure: Angioplasty-coronary;  Surgeon: Siddhartha Hicks MD;  Location: [facility] CATH INVASIVE LOCATION;  Service:    • ORTHOPEDIC SURGERY     • PACEMAKER IMPLANTATION     • NV RT/LT HEART CATHETERS N/A 10/22/2017    Procedure: Percutaneous Coronary Intervention;  Surgeon: Siddhartha Hicks MD;  Location: [facility] CATH INVASIVE LOCATION;  Service: Cardiovascular   • ROTATOR CUFF REPAIR Left 2015   • STOMACH SURGERY      ulcer repair and vagus nerve cut       Family History:   Family History   Problem Relation Age of Onset   • Heart attack Mother    • Arrhythmia Mother         PACEMAKER   • Hyperlipidemia Mother    • Hypertension Mother    • Thyroid disease Mother    • Stroke Mother    • Obesity Mother        Social History:   Social History     Socioeconomic History   • Marital status:      Spouse name: Not on file   • Number of children: Not on file   • Years of education: Not on file   • Highest education level: Not on file   Tobacco Use   • Smoking status: Former Smoker     Last attempt to quit: 1974     Years since quittin.0   • Smokeless tobacco: Never Used   Substance and Sexual Activity   • Alcohol use: No   • Drug use: No       Medications:     Current Facility-Administered Medications:   •  acetaminophen (TYLENOL) tablet 650 mg, 650 mg, Oral, Q4H PRN, 650 mg at 20 0903 **OR** acetaminophen (TYLENOL) 160 MG/5ML solution 650 mg, 650 mg, Oral, Q4H PRN **OR** acetaminophen (TYLENOL) suppository 650 mg, 650 mg, Rectal, Q4H PRN, Michelle Persaud DO  •  amLODIPine (NORVASC) tablet 5 mg, 5 mg, Oral, Daily,  Michelle Persaud, DO, 5 mg at 01/11/20 0903  •  bisacodyl (DULCOLAX) suppository 10 mg, 10 mg, Rectal, Daily PRN, Michelle Persaud, DO  •  carvedilol (COREG) tablet 25 mg, 25 mg, Oral, Q12H, Michelle Persaud, DO, 25 mg at 01/11/20 0903  •  dextrose (D50W) 25 g/ 50mL Intravenous Solution 25 g, 25 g, Intravenous, Q15 Min PRN, Michelle Persaud, DO  •  dextrose (GLUTOSE) oral gel 1 tube, 1 tube, Oral, Q15 Min PRN, Michelle Persaud, DO  •  enoxaparin (LOVENOX) syringe 40 mg, 40 mg, Subcutaneous, Q24H, Michelle Persaud, DO, 40 mg at 01/11/20 0547  •  famotidine (PEPCID) injection 20 mg, 20 mg, Intravenous, Daily, Michelle Persaud, DO, 20 mg at 01/11/20 0904  •  glucagon (human recombinant) (GLUCAGEN DIAGNOSTIC) injection 1 mg, 1 mg, Subcutaneous, PRN, Michelle Persaud, DO  •  hydrALAZINE (APRESOLINE) injection 20 mg, 20 mg, Intravenous, Q6H PRN, Michelle Persaud, DO, 20 mg at 01/11/20 0335  •  insulin regular (humuLIN R,novoLIN R) injection 0-7 Units, 0-7 Units, Subcutaneous, Q6H, Michelle Persaud, DO, 3 Units at 01/10/20 2356  •  melatonin tablet 5 mg, 5 mg, Oral, Nightly PRN, Michelle Persaud, DO, 5 mg at 01/10/20 2356  •  Morphine sulfate (PF) injection 4 mg, 4 mg, Intravenous, Q2H PRN, Michelle Persaud, DO, 4 mg at 01/11/20 0934  •  ondansetron (ZOFRAN) injection 4 mg, 4 mg, Intravenous, Q6H PRN, Michelle Persaud, DO, 4 mg at 01/11/20 0546  •  Pharmacy to Dose enoxaparin (LOVENOX), , Does not apply, Continuous PRN, Michelle Persaud, DO  •  Pharmacy to Dose Zosyn, , Does not apply, Continuous PRN, Michelle Persaud, DO  •  piperacillin-tazobactam (ZOSYN) 3.375 g in sodium chloride 0.9 % 100 mL IVPB, 3.375 g, Intravenous, Q8H, Michelle Persaud, DO, 3.375 g at 01/11/20 0343  •  promethazine (PHENERGAN) 12.5 mg in sodium chloride 0.9 % 50 mL, 12.5 mg, Intravenous, Q6H PRN, Michelle Persaud, DO  •  sodium chloride 0.9 % flush 10 mL, 10 mL, Intravenous, PRN, Michelle Persaud, DO  •  sodium chloride 0.9 % flush 10 mL, 10 mL, Intravenous, Q12H,  Michelle Persaud DO, 10 mL at 01/11/20 0904  •  sodium chloride 0.9 % flush 10 mL, 10 mL, Intravenous, PRN, Michelle Persaud DO  •  sodium chloride 0.9 % infusion, 125 mL/hr, Intravenous, Continuous, Michelle Persaud DO, Last Rate: 125 mL/hr at 01/11/20 0344, 125 mL/hr at 01/11/20 0344    Allergies:   No Known Allergies    Physical Exam:  Vital Signs:   Vitals:    01/11/20 0700 01/11/20 0800 01/11/20 0900 01/11/20 0903   BP: 121/45 130/43 152/56 152/56   BP Location:       Patient Position:       Pulse: 63 60 61    Resp:       Temp:  99.5 °F (37.5 °C)     TempSrc:  Oral     SpO2: 97% 98% 96%    Weight:       Height:           Physical Exam   Constitutional: He is oriented to person, place, and time. He appears well-developed and well-nourished. No distress.   HENT:   Head: Normocephalic and atraumatic.   Moist Mucous Membranes.    Eyes: Pupils are equal, round, and reactive to light. EOM are normal. No scleral icterus.   Neck: No tracheal deviation present.   Cardiovascular: Normal rate, regular rhythm and intact distal pulses. Exam reveals no gallop and no friction rub.   Murmur heard.  Normal JVD.  2/6 systolic murmur over the left sternal border.     Pulmonary/Chest: Effort normal and breath sounds normal. No stridor. No respiratory distress. He has no wheezes. He has no rales. He exhibits no tenderness.   Abdominal: Soft. Bowel sounds are normal. He exhibits no distension. There is tenderness. There is no rebound and no guarding.   Musculoskeletal: Normal range of motion. He exhibits no edema.   Lymphadenopathy:     He has no cervical adenopathy.   Neurological: He is alert and oriented to person, place, and time.   Skin: Skin is warm and dry. He is not diaphoretic. No erythema.   Psychiatric: He has a normal mood and affect. His behavior is normal.   Nursing note and vitals reviewed.      Results Review:   Results from last 7 days   Lab Units 01/11/20  0435 01/10/20  1934   SODIUM mmol/L 138 135*   POTASSIUM  mmol/L 4.5 4.7   CHLORIDE mmol/L 99 96*   CO2 mmol/L 25.4 24.4   BUN mg/dL 24* 23   CREATININE mg/dL 1.68* 1.73*   CALCIUM mg/dL 8.2* 8.9   BILIRUBIN mg/dL  --  0.6   ALK PHOS U/L  --  181*   ALT (SGPT) U/L  --  15   AST (SGOT) U/L  --  26   GLUCOSE mg/dL 130* 247*     Results from last 7 days   Lab Units 01/10/20  1934   TROPONIN T ng/mL 0.014     Results from last 7 days   Lab Units 01/11/20  0435 01/10/20  1934   WBC 10*3/mm3 25.23* 30.42*   HEMOGLOBIN g/dL 12.3* 13.3   HEMATOCRIT % 37.9 38.5   PLATELETS 10*3/mm3 253 415     Results from last 7 days   Lab Units 01/10/20  1934   INR  1.06     Results from last 7 days   Lab Units 01/10/20  1934   MAGNESIUM mg/dL 2.0     Results from last 7 days   Lab Units 01/10/20  1934   CHOLESTEROL mg/dL 176   TRIGLYCERIDES mg/dL 67   HDL CHOL mg/dL 67*   LDL CHOL mg/dL 96       I personally viewed and interpreted the patient's EKG/Telemetry data     ECG 1/10/2020: Atrial paced rhythm at 60 bpm.  Normal axis.  QTc 412 ms.    Assessment / Plan:     1.  Preoperative cardiac risk assessment  --The patient has a cardiac history significant for severe single-vessel coronary artery disease with PCI in 2017, and bradycardia with a [employer] Miquel's dual-chamber pacemaker in place  --Currently presents with acute cholecystitis, potentially requiring cholecystectomy  --ECG shows an atrial paced rhythm with no ischemic changes  --The patient currently denies chest pain or exertional anginal symptoms  --No signs/symptoms to suggest decompensated valvulopathy, arrhythmia, or CHF  --Currently the patient is at low risk for perioperative cardiac events, with a low risk revised cardiac risk index of 0.9%.  Given his functional status is >4 METS without anginal symptoms, it is reasonable to proceed with the proposed surgery without further cardiac work-up or interventions  --Recommend continuation of aspirin 81 mg p.o. daily in the perioperative setting  --Continue home dose of Coreg  --Cardiology  will be available as needed over the weekend    2.  Coronary artery disease  --Known history of single-vessel CAD, PCI to the proximal LCx in 2017  --Current ECG is without ischemic changes  --Continue aspirin 81 mg daily    3.  Symptomatic bradycardia  --Has dual-chamber [employer] pacemaker in place  --Last interrogation showed adequate battery life, normal functioning pacemaker    4.  Hypertension  --Continue home antihypertensives    5.  Acute cholecystitis  --On antibiotic therapy  --Surgery to evaluate for cholecystectomy    6.  Acute kidney injury  --Likely prerenal secondary to dehydration in the setting of cholecystitis  --Possible underlying component of CKD given history of poorly controlled type 2 diabetes mellitus    7.  Lactic acidosis  --Secondary to cholecystitis      Thank you for allowing me to participate in the care of your patient. Please do not hesitate to contact me with additional questions or concerns.     MARIA ESTHER Luque MD  Interventional Cardiology   01/11/20  10:19 AM

## 2020-01-11 NOTE — ED PROVIDER NOTES
Subjective   History of Present Illness    Chief Complaint: Abdominal pain  History of Present Illness: 84-year-old male with history of diabetes hypertension heart disease on Brilinta, remote laparotomy for perforated peptic ulcer in the 1970s, presents with right upper quadrant pain x1 day  Onset: 1 day  Duration: Persistent  Exacerbating / Alleviating factors: Attempted p.o. intake  Associated symptoms: Nausea      Nurses Notes reviewed and agree, including vitals, allergies, social history and prior medical history.     REVIEW OF SYSTEMS: All systems reviewed and not pertinent unless noted.    Positive for: Sharp right upper quadrant abdominal pain with nausea    Negative for: Fever, GI bleeding, syncope  Review of Systems    Past Medical History:   Diagnosis Date   • Bradycardia    • Colon polyp    • Diabetes mellitus (CMS/HCC)    • Disease of thyroid gland    • History of stomach ulcers    • Hypertension    • Myocardial infarction        No Known Allergies    Past Surgical History:   Procedure Laterality Date   • CARDIAC CATHETERIZATION N/A 10/22/2017    Procedure: Coronary angiography;  Surgeon: Siddhartha Hicks MD;  Location: [facility] CATH INVASIVE LOCATION;  Service:    • CARDIAC CATHETERIZATION N/A 10/22/2017    Procedure: Left Heart Cath;  Surgeon: Siddhartha Hicks MD;  Location: [facility] INVASIVE LOCATION;  Service:    • CARDIAC CATHETERIZATION N/A 10/22/2017    Procedure: Angioplasty-coronary;  Surgeon: Siddhartha Hicks MD;  Location: [facility] CATH INVASIVE LOCATION;  Service:    • ORTHOPEDIC SURGERY     • PACEMAKER IMPLANTATION     • OK RT/LT HEART CATHETERS N/A 10/22/2017    Procedure: Percutaneous Coronary Intervention;  Surgeon: Siddhartha Hicks MD;  Location: [facility] CATH INVASIVE LOCATION;  Service: Cardiovascular   • ROTATOR CUFF REPAIR Left 2015   • STOMACH SURGERY      ulcer repair and vagus nerve cut       Family History   Problem Relation Age of Onset   • Heart attack Mother    • Arrhythmia  Mother         PACEMAKER   • Hyperlipidemia Mother    • Hypertension Mother    • Thyroid disease Mother    • Stroke Mother    • Obesity Mother        Social History     Socioeconomic History   • Marital status:      Spouse name: Not on file   • Number of children: Not on file   • Years of education: Not on file   • Highest education level: Not on file   Tobacco Use   • Smoking status: Former Smoker     Last attempt to quit: 1974     Years since quittin.0   • Smokeless tobacco: Never Used   Substance and Sexual Activity   • Alcohol use: No   • Drug use: No           Objective   Physical Exam      GENERAL APPEARANCE: Well developed, well nourished, elderly nontoxic 84-year-old white male in pain   VITAL SIGNS: per nursing, reviewed and noted  SKIN: no rashes, ulcerations or petechiae.  Head: Normocephalic, atraumatic.   EYES: perrla. EOMI.  ENT:  TM clear, posterior pharynx patent.  LUNGS:  normal breath sounds. No retractions.   CARDIOVASCULAR:  regular rate and rhythm, no murmurs.  Good Peripheral pulses.  ABDOMEN: Soft, right upper quadrant tenderness to palpation with mild guarding, normal bowel sounds. No hernia. No ascites.  MUSCULOSKELETAL:  No tenderness. Full ROM. Strength and tone normal.  NEUROLOGIC: Alert, oriented x 3. No gross deficits.   NECK: Supple, symmetric. No tenderness, no masses. Full ROM  Back: full rom, no paraspinal spasm. No CVA tenderness.   PSYCH: appropriate affect,  : no bladder tenderness or distention, no CVA tenderness      Procedures     No attending provider procedures were performed      ED Course  ED Course as of Elmer 10 2237   Fri Elmer 10, 2020   2008 Lipase(!): 8 [PF]    Glucose(!): 247 [PF]   2008 BUN: 23 [PF]    Creatinine(!): 1.73 [PF]    Sodium(!): 135 [PF]   2008 Potassium: 4.7 [PF]   2008 Chloride(!): 96 [PF]   2008 CO2: 24.4 [PF]   2008 Calcium: 8.9 [PF]   2008 Total Protein: 7.6 [PF]   2008 Albumin: 3.90 [PF]   2008 ALT (SGPT): 15 [PF]   2008 AST  (SGOT): 26 [PF]   2008 Alkaline Phosphatase(!): 181 [PF]   2008 Total Bilirubin: 0.6 [PF]   2008 WBC(!!): 30.42 [PF]   2008 RBC: 4.14 [PF]   2008 Hemoglobin: 13.3 [PF]   2008 Hematocrit: 38.5 [PF]   2009 MCV: 93.0 [PF]   2009 MCH: 32.1 [PF]   2009 MCHC: 34.5 [PF]   2009 RDW(!): 11.6 [PF]   2009 Platelets: 415 [PF]   2009 Neutrophil Rel %(!): 87.8 [PF]   2009 Lymphocyte Rel %(!): 3.8 [PF]   2009 Urobilinogen, UA: 0.2 E.U./dL [PF]   2009 Nitrite, UA: Negative [PF]   2009 Leukocytes, UA: Negative [PF]   2009 Ketones, UA(!): Trace [PF]   2009 Glucose(!): 100 mg/dL (Trace) [PF]   2009 Specific Gravity, UA: 1.022 [PF]   2009 pH, UA: 5.5 [PF]   2009 RBC, UA(!): 6-12 [PF]   2009 WBC, UA(!): 0-2 [PF]   2009 Bacteria, UA(!): Trace [PF]   2009 Squamous Epithelial Cells, UA: 0-2 [PF]      ED Course User Index  [PF] Sahil Concepcion, DO      FINDINGS:  There is no renal or ureteral stone or hydronephrosis. The  bladder is normal in contour.  There is bilateral lower lobe  atelectasis.  The gallbladder is dilated with wall thickening  and stranding of the adjacent fat.  There are no stones by CT.  The liver, spleen, pancreas and adrenal glands appear normal.  The bowel gas pattern is nonobstructive.  There is scattered  diverticulosis without diverticulitis.  There is no evidence for  appendicitis.  The aorta and iliac arteries are calcified.  There are patchy areas of sclerosis and lucency throughout the  visualized bones which may represent a bone marrow replacement  process.  Recommend appropriate clinical follow-up.  Bone scan  may be helpful for further characterization.     IMPRESSION:  Findings worrisome for cholecystitis.  Recommend ultrasound and  hepatobiliary scan  nonspecific bony findings          10:37 PM  Work-up consistent with cholecystitis leukocytosis.   Discussed with surgeon here, Dr. Baez, recommended Zosyn. Will consult. Advised hold brilinta, in the interim.     9:41 PM    Patient wants us to [facility]  [place] [facility] beds at [facility], per .                       Dr. Persaud, will admit, evaluate in ER.         MDM  EKG interpreted by me reveals atrial paced rhythm at a rate of 60.  Final diagnoses:   Acute cholecystitis            Sahil Concepcion, DO  01/10/20 2207       Sahil Concepcion,   01/10/20 2233

## 2020-01-12 ENCOUNTER — ANESTHESIA (OUTPATIENT)
Dept: PERIOP | Facility: HOSPITAL | Age: 85
End: 2020-01-12

## 2020-01-12 ENCOUNTER — ANESTHESIA EVENT (OUTPATIENT)
Dept: PERIOP | Facility: HOSPITAL | Age: 85
End: 2020-01-12

## 2020-01-12 ENCOUNTER — APPOINTMENT (OUTPATIENT)
Dept: GENERAL RADIOLOGY | Facility: HOSPITAL | Age: 85
End: 2020-01-12

## 2020-01-12 LAB
ALBUMIN SERPL-MCNC: 2.5 G/DL (ref 3.5–5.2)
ALBUMIN/GLOB SERPL: 0.9 G/DL
ALP SERPL-CCNC: 172 U/L (ref 39–117)
ALT SERPL W P-5'-P-CCNC: 96 U/L (ref 1–41)
ANION GAP SERPL CALCULATED.3IONS-SCNC: 11.9 MMOL/L (ref 5–15)
AST SERPL-CCNC: 67 U/L (ref 1–40)
BILIRUB SERPL-MCNC: 0.8 MG/DL (ref 0.2–1.2)
BUN BLD-MCNC: 28 MG/DL (ref 8–23)
BUN/CREAT SERPL: 14.9 (ref 7–25)
CALCIUM SPEC-SCNC: 7.6 MG/DL (ref 8.6–10.5)
CHLORIDE SERPL-SCNC: 103 MMOL/L (ref 98–107)
CO2 SERPL-SCNC: 22.1 MMOL/L (ref 22–29)
CREAT BLD-MCNC: 1.88 MG/DL (ref 0.76–1.27)
D-LACTATE SERPL-SCNC: 1.1 MMOL/L (ref 0.5–2)
DEPRECATED RDW RBC AUTO: 42.7 FL (ref 37–54)
ERYTHROCYTE [DISTWIDTH] IN BLOOD BY AUTOMATED COUNT: 11.9 % (ref 12.3–15.4)
GFR SERPL CREATININE-BSD FRML MDRD: 35 ML/MIN/1.73
GLOBULIN UR ELPH-MCNC: 2.9 GM/DL
GLUCOSE BLD-MCNC: 155 MG/DL (ref 65–99)
GLUCOSE BLDC GLUCOMTR-MCNC: 141 MG/DL (ref 70–130)
GLUCOSE BLDC GLUCOMTR-MCNC: 146 MG/DL (ref 70–130)
GLUCOSE BLDC GLUCOMTR-MCNC: 242 MG/DL (ref 70–130)
GLUCOSE BLDC GLUCOMTR-MCNC: 246 MG/DL (ref 70–130)
HCT VFR BLD AUTO: 30.6 % (ref 37.5–51)
HGB BLD-MCNC: 10.1 G/DL (ref 13–17.7)
LIPASE SERPL-CCNC: 5 U/L (ref 13–60)
MAGNESIUM SERPL-MCNC: 1.8 MG/DL (ref 1.6–2.4)
MCH RBC QN AUTO: 32 PG (ref 26.6–33)
MCHC RBC AUTO-ENTMCNC: 33 G/DL (ref 31.5–35.7)
MCV RBC AUTO: 96.8 FL (ref 79–97)
PLATELET # BLD AUTO: 235 10*3/MM3 (ref 140–450)
PMV BLD AUTO: 11.1 FL (ref 6–12)
POTASSIUM BLD-SCNC: 4.6 MMOL/L (ref 3.5–5.2)
PROT SERPL-MCNC: 5.4 G/DL (ref 6–8.5)
RBC # BLD AUTO: 3.16 10*6/MM3 (ref 4.14–5.8)
SODIUM BLD-SCNC: 137 MMOL/L (ref 136–145)
TROPONIN T SERPL-MCNC: 0.02 NG/ML (ref 0–0.03)
WBC NRBC COR # BLD: 25.14 10*3/MM3 (ref 3.4–10.8)

## 2020-01-12 PROCEDURE — 74300 X-RAY BILE DUCTS/PANCREAS: CPT

## 2020-01-12 PROCEDURE — 99232 SBSQ HOSP IP/OBS MODERATE 35: CPT | Performed by: INTERNAL MEDICINE

## 2020-01-12 PROCEDURE — 25010000002 DEXAMETHASONE PER 1 MG: Performed by: NURSE ANESTHETIST, CERTIFIED REGISTERED

## 2020-01-12 PROCEDURE — 25010000002 PROPOFOL 10 MG/ML EMULSION: Performed by: NURSE ANESTHETIST, CERTIFIED REGISTERED

## 2020-01-12 PROCEDURE — 63710000001 INSULIN ASPART PER 5 UNITS: Performed by: FAMILY MEDICINE

## 2020-01-12 PROCEDURE — 25010000002 PIPERACILLIN SOD-TAZOBACTAM PER 1 G: Performed by: FAMILY MEDICINE

## 2020-01-12 PROCEDURE — 83605 ASSAY OF LACTIC ACID: CPT | Performed by: INTERNAL MEDICINE

## 2020-01-12 PROCEDURE — 25010000002 ONDANSETRON PER 1 MG: Performed by: FAMILY MEDICINE

## 2020-01-12 PROCEDURE — 25010000002 HYDROMORPHONE 1 MG/ML SOLUTION: Performed by: NURSE ANESTHETIST, CERTIFIED REGISTERED

## 2020-01-12 PROCEDURE — 0FT44ZZ RESECTION OF GALLBLADDER, PERCUTANEOUS ENDOSCOPIC APPROACH: ICD-10-PCS | Performed by: SURGERY

## 2020-01-12 PROCEDURE — 25010000002 MORPHINE PER 10 MG: Performed by: FAMILY MEDICINE

## 2020-01-12 PROCEDURE — 84484 ASSAY OF TROPONIN QUANT: CPT | Performed by: INTERNAL MEDICINE

## 2020-01-12 PROCEDURE — 85027 COMPLETE CBC AUTOMATED: CPT | Performed by: INTERNAL MEDICINE

## 2020-01-12 PROCEDURE — 83735 ASSAY OF MAGNESIUM: CPT | Performed by: INTERNAL MEDICINE

## 2020-01-12 PROCEDURE — 63710000001 INSULIN REGULAR HUMAN PER 5 UNITS: Performed by: SURGERY

## 2020-01-12 PROCEDURE — 25010000002 FENTANYL CITRATE (PF) 100 MCG/2ML SOLUTION: Performed by: NURSE ANESTHETIST, CERTIFIED REGISTERED

## 2020-01-12 PROCEDURE — 25010000002 PIPERACILLIN SOD-TAZOBACTAM PER 1 G: Performed by: SURGERY

## 2020-01-12 PROCEDURE — 25010000003 LIDOCAINE 1 % SOLUTION: Performed by: SURGERY

## 2020-01-12 PROCEDURE — 47563 LAPARO CHOLECYSTECTOMY/GRAPH: CPT | Performed by: SURGERY

## 2020-01-12 PROCEDURE — 25010000002 IOPAMIDOL 61 % SOLUTION: Performed by: SURGERY

## 2020-01-12 PROCEDURE — 80053 COMPREHEN METABOLIC PANEL: CPT | Performed by: INTERNAL MEDICINE

## 2020-01-12 PROCEDURE — 82962 GLUCOSE BLOOD TEST: CPT

## 2020-01-12 PROCEDURE — 88304 TISSUE EXAM BY PATHOLOGIST: CPT | Performed by: SURGERY

## 2020-01-12 PROCEDURE — 83690 ASSAY OF LIPASE: CPT | Performed by: INTERNAL MEDICINE

## 2020-01-12 RX ORDER — LIDOCAINE HYDROCHLORIDE 10 MG/ML
INJECTION, SOLUTION INFILTRATION; PERINEURAL AS NEEDED
Status: DISCONTINUED | OUTPATIENT
Start: 2020-01-12 | End: 2020-01-12 | Stop reason: HOSPADM

## 2020-01-12 RX ORDER — FENTANYL CITRATE 50 UG/ML
INJECTION, SOLUTION INTRAMUSCULAR; INTRAVENOUS AS NEEDED
Status: DISCONTINUED | OUTPATIENT
Start: 2020-01-12 | End: 2020-01-12 | Stop reason: SURG

## 2020-01-12 RX ORDER — ONDANSETRON 2 MG/ML
4 INJECTION INTRAMUSCULAR; INTRAVENOUS ONCE AS NEEDED
Status: DISCONTINUED | OUTPATIENT
Start: 2020-01-12 | End: 2020-01-13

## 2020-01-12 RX ORDER — ROCURONIUM BROMIDE 10 MG/ML
INJECTION, SOLUTION INTRAVENOUS AS NEEDED
Status: DISCONTINUED | OUTPATIENT
Start: 2020-01-12 | End: 2020-01-12 | Stop reason: SURG

## 2020-01-12 RX ORDER — DEXAMETHASONE SODIUM PHOSPHATE 4 MG/ML
INJECTION, SOLUTION INTRA-ARTICULAR; INTRALESIONAL; INTRAMUSCULAR; INTRAVENOUS; SOFT TISSUE AS NEEDED
Status: DISCONTINUED | OUTPATIENT
Start: 2020-01-12 | End: 2020-01-12 | Stop reason: SURG

## 2020-01-12 RX ORDER — BUPIVACAINE HYDROCHLORIDE 2.5 MG/ML
INJECTION, SOLUTION EPIDURAL; INFILTRATION; INTRACAUDAL AS NEEDED
Status: DISCONTINUED | OUTPATIENT
Start: 2020-01-12 | End: 2020-01-12 | Stop reason: HOSPADM

## 2020-01-12 RX ORDER — SODIUM CHLORIDE, SODIUM LACTATE, POTASSIUM CHLORIDE, CALCIUM CHLORIDE 600; 310; 30; 20 MG/100ML; MG/100ML; MG/100ML; MG/100ML
INJECTION, SOLUTION INTRAVENOUS CONTINUOUS PRN
Status: DISCONTINUED | OUTPATIENT
Start: 2020-01-12 | End: 2020-01-12 | Stop reason: SURG

## 2020-01-12 RX ORDER — MAGNESIUM HYDROXIDE 1200 MG/15ML
LIQUID ORAL AS NEEDED
Status: DISCONTINUED | OUTPATIENT
Start: 2020-01-12 | End: 2020-01-12 | Stop reason: HOSPADM

## 2020-01-12 RX ORDER — PROPOFOL 10 MG/ML
VIAL (ML) INTRAVENOUS AS NEEDED
Status: DISCONTINUED | OUTPATIENT
Start: 2020-01-12 | End: 2020-01-12 | Stop reason: SURG

## 2020-01-12 RX ORDER — KETAMINE HCL IN NACL, ISO-OSM 100MG/10ML
SYRINGE (ML) INJECTION AS NEEDED
Status: DISCONTINUED | OUTPATIENT
Start: 2020-01-12 | End: 2020-01-12 | Stop reason: SURG

## 2020-01-12 RX ORDER — HYDROCODONE BITARTRATE AND ACETAMINOPHEN 7.5; 325 MG/1; MG/1
1 TABLET ORAL EVERY 6 HOURS PRN
Status: DISCONTINUED | OUTPATIENT
Start: 2020-01-12 | End: 2020-01-14 | Stop reason: HOSPADM

## 2020-01-12 RX ORDER — L.ACID,PARA/B.BIFIDUM/S.THERM 8B CELL
1 CAPSULE ORAL 2 TIMES DAILY
Status: DISCONTINUED | OUTPATIENT
Start: 2020-01-12 | End: 2020-01-14 | Stop reason: HOSPADM

## 2020-01-12 RX ADMIN — FAMOTIDINE 20 MG: 10 INJECTION, SOLUTION INTRAVENOUS at 08:02

## 2020-01-12 RX ADMIN — HUMAN INSULIN 3 UNITS: 100 INJECTION, SOLUTION SUBCUTANEOUS at 17:45

## 2020-01-12 RX ADMIN — SODIUM CHLORIDE, POTASSIUM CHLORIDE, SODIUM LACTATE AND CALCIUM CHLORIDE: 600; 310; 30; 20 INJECTION, SOLUTION INTRAVENOUS at 09:15

## 2020-01-12 RX ADMIN — FENTANYL CITRATE 50 MCG: 50 INJECTION INTRAMUSCULAR; INTRAVENOUS at 09:27

## 2020-01-12 RX ADMIN — SODIUM CHLORIDE 125 ML/HR: 9 INJECTION, SOLUTION INTRAVENOUS at 11:55

## 2020-01-12 RX ADMIN — CARVEDILOL 25 MG: 25 TABLET, FILM COATED ORAL at 08:02

## 2020-01-12 RX ADMIN — ONDANSETRON 4 MG: 2 INJECTION INTRAMUSCULAR; INTRAVENOUS at 09:26

## 2020-01-12 RX ADMIN — ONDANSETRON 4 MG: 2 INJECTION INTRAMUSCULAR; INTRAVENOUS at 07:54

## 2020-01-12 RX ADMIN — MORPHINE SULFATE 4 MG: 4 INJECTION INTRAVENOUS at 07:53

## 2020-01-12 RX ADMIN — ROCURONIUM BROMIDE 10 MG: 10 INJECTION INTRAVENOUS at 10:22

## 2020-01-12 RX ADMIN — HYDROMORPHONE HYDROCHLORIDE 0.2 MG: 1 INJECTION, SOLUTION INTRAMUSCULAR; INTRAVENOUS; SUBCUTANEOUS at 15:05

## 2020-01-12 RX ADMIN — PIPERACILLIN SODIUM AND TAZOBACTAM SODIUM 3.38 G: 3; .375 INJECTION, POWDER, FOR SOLUTION INTRAVENOUS at 04:20

## 2020-01-12 RX ADMIN — LIDOCAINE HYDROCHLORIDE 50 MG: 20 INJECTION, SOLUTION INTRAVENOUS at 09:16

## 2020-01-12 RX ADMIN — ACETAMINOPHEN 650 MG: 650 SUPPOSITORY RECTAL at 04:20

## 2020-01-12 RX ADMIN — INSULIN ASPART 3 UNITS: 100 INJECTION, SOLUTION INTRAVENOUS; SUBCUTANEOUS at 21:30

## 2020-01-12 RX ADMIN — LIDOCAINE HYDROCHLORIDE 60 MG: 20 INJECTION, SOLUTION INTRAVENOUS at 10:17

## 2020-01-12 RX ADMIN — PIPERACILLIN SODIUM AND TAZOBACTAM SODIUM 3.38 G: 3; .375 INJECTION, POWDER, FOR SOLUTION INTRAVENOUS at 20:30

## 2020-01-12 RX ADMIN — ROCURONIUM BROMIDE 40 MG: 10 INJECTION INTRAVENOUS at 09:20

## 2020-01-12 RX ADMIN — SUGAMMADEX 300 MG: 100 INJECTION, SOLUTION INTRAVENOUS at 10:51

## 2020-01-12 RX ADMIN — PROPOFOL 80 MG: 10 INJECTION, EMULSION INTRAVENOUS at 09:19

## 2020-01-12 RX ADMIN — Medication 15 MG: at 09:16

## 2020-01-12 RX ADMIN — ASPIRIN 81 MG: 81 TABLET, COATED ORAL at 08:02

## 2020-01-12 RX ADMIN — DEXAMETHASONE SODIUM PHOSPHATE 8 MG: 4 INJECTION, SOLUTION INTRAMUSCULAR; INTRAVENOUS at 09:26

## 2020-01-12 RX ADMIN — MORPHINE SULFATE 4 MG: 4 INJECTION INTRAVENOUS at 04:20

## 2020-01-12 RX ADMIN — LIDOCAINE HYDROCHLORIDE 50 MG: 20 INJECTION, SOLUTION INTRAVENOUS at 09:30

## 2020-01-12 RX ADMIN — SODIUM CHLORIDE 125 ML/HR: 9 INJECTION, SOLUTION INTRAVENOUS at 04:25

## 2020-01-12 RX ADMIN — FENTANYL CITRATE 50 MCG: 50 INJECTION INTRAMUSCULAR; INTRAVENOUS at 09:58

## 2020-01-12 RX ADMIN — PIPERACILLIN SODIUM AND TAZOBACTAM SODIUM 3.38 G: 3; .375 INJECTION, POWDER, FOR SOLUTION INTRAVENOUS at 11:55

## 2020-01-12 RX ADMIN — CARVEDILOL 25 MG: 25 TABLET, FILM COATED ORAL at 20:30

## 2020-01-12 RX ADMIN — HYDROCODONE BITARTRATE AND ACETAMINOPHEN 1 TABLET: 7.5; 325 TABLET ORAL at 16:11

## 2020-01-12 RX ADMIN — Medication 1 CAPSULE: at 20:30

## 2020-01-12 NOTE — PLAN OF CARE
Problem: Pain, Acute (Adult)  Goal: Acceptable Pain Control/Comfort Level  Outcome: Ongoing (interventions implemented as appropriate)  Flowsheets (Taken 1/11/2020 0155 by Ashok Middleton RN)  Acceptable Pain Control/Comfort Level: making progress toward outcome     Problem: Cholecystectomy (Adult)  Goal: Signs and Symptoms of Listed Potential Problems Will be Absent, Minimized or Managed (Cholecystectomy)  Outcome: Ongoing (interventions implemented as appropriate)     Problem: Fall Risk (Adult)  Goal: Absence of Fall  Outcome: Outcome(s) achieved     Problem: Patient Care Overview  Goal: Plan of Care Review  Flowsheets  Taken 1/12/2020 0243  Progress: no change  Outcome Summary: Resting comfortably with prn pain medication.  Scheduled for gallbladder removal this date.  Consent on chart.  Will continue to monitor.  Taken 1/12/2020 0000  Plan of Care Reviewed With: patient

## 2020-01-12 NOTE — OP NOTE
PROCEDURE DATE: 1/12/2020    SURGEON: Perla Baez MD, FACS    PREOPERATIVE DIAGNOSIS: Acute cholecystitis    POSTOPERATIVE DIAGNOSIS: Same    PROCEDURE: Laparoscopic cholecystectomy with intraoperative cholangiogram.    ANESTHESIA: general    EBL: 75mL    SPECIMENS: Gallbladder    INDICATIONS FOR THE PROCEDURE: Mr. Singh is an 84-year-old gentleman who was admitted via the emergency department with signs and symptoms of acute cholecystitis.  Imaging confirmed this diagnosis, and he was initially treated with broad-spectrum IV antibiotic therapy with eventual plans for definitive management with cholecystectomy.  Due to his past cardiac history, it was felt that he would benefit from preoperative or stratification by the cardiology service.  This was obtained and he was felt to be appropriate to proceed without additional cardiac testing or intervention.  He was counseled extensively regarding the cholecystectomy procedure and understood that there were plans in place to proceed with a laparoscopic approach.  However, given the severity of his cholecystitis, combined with his prior surgical history of laparotomy for a perforated peptic ulcer, it was felt that he had an increased risk of conversion to an open procedure.  He was counseled regarding the usual risks, benefits, and alternatives to the surgical procedure, and provided his informed consent.  He is now being brought to the operating room for the same.    DESCRIPTION OF THE PROCEDURE:  The patient was seen and examined in the preoperative holding area on the day of surgery and there are no changes to the medical history.  The patient was then taken to the operating room and placed supine on the operating table where a timeout is performed using the WHO checklist.  The patient received appropriate preoperative antibiotics as well as subcutaneous heparin for DVT prophylaxis.    Following the satisfactory induction of general anesthesia the patient's  abdomen was prepped and draped in the usual sterile fashion.  A vertical incision was made just above the umbilicus and was carried through the soft tissue to the level of the fascia.  The fascia was grasped and elevated between Kocher clamps and incised sharply with a knife.  Entry was confirmed into the peritoneum visually and there was no bowel visible in the vicinity of this incision.  Two stay sutures of 0 Vicryl were placed in either side of the fascia and a Howell cannula was inserted under direct visualization.  The sutures were used to secure the cannula.    The abdomen was insufflated with carbon dioxide to a pressure of 15 mmHg and the laparoscope was introduced.  The abdomen was inspected and no injuries were noted from initial trocar placement.  Three additional 5 mm ports were then placed in the subxiphoid, right subcostal, and right upper quadrant positions under direct visualization.  The patient was then placed into the reverse Trendelenburg position with the left side down.    There were noted to be dense adhesions between the omentum and the liver precluding immediate visualization of the gallbladder.  These were taken down bluntly. The gallbladder was then identified and noted to be markedly inflamed.  It was subsequently decompressed using a needle aspirator.  There was return of thick brown effluent with visible sludge type particles within the fluid, likely representative of microlithiasis.  The dome of the gallbladder was then retracted cephalad up over the dome of the liver and additional dense adhesions between the omentum and the remainder of the gallbladder were freed using a combination of blunt dissection and electrocautery.  Due to the severe inflammation surrounding the infundibulum of the gallbladder, I initially elected to use the electrocautery to take the gallbladder off the liver in a dome down fashion.  This was done in order to facilitate mobilization and visualization of the  appropriate structures.  The gallbladder was taken down about two thirds of the way down the gallbladder fossa and then I was able to grasped the gallbladder and retracted cephalad more adequately in order to further visualize the true infundibulum of the gallbladder which could then be retracted laterally.  Once this was accomplished, a laparoscopic Kitner was used to dissect out the critical structures within the triangle of Calot. The cystic duct and cystic artery were identified and circumferentially skeletonized.  The cystic duct and cystic artery were completely circumferentially dissected until a critical view of safety was obtained and once this was done, the cystic duct was clipped at its junction with the gallbladder.  The cholangiogram catheter was inserted through a separate stab incision and advanced into the peritoneal cavity.  A small ductotomy was made in the cystic duct just below the previously placed clip in the cholangiogram catheter was threaded into the cystic duct stump.  The balloon was inflated and a intraoperative cholangiogram was obtained under live fluoroscopy.  This demonstrated no filling defects throughout the biliary tree, with good filling of the cystic duct, common bile duct, common hepatic duct, and spillage into both the right and left hepatic ducts, biliary radicles, and the duodenum.  The cholangiogram catheter was removed, and the cystic duct was doubly clipped at the level of the ductotomy.  It was then divided.  The cystic artery was doubly clipped and divided close to the gallbladder.    The gallbladder was then dissected free from its few remaining peritoneal attachments to the liver using Bovie electrocautery.  Hemostasis was ensured using electrocautery.  Once the gallbladder was completed freed from the undersurface of liver was placed into an Endo Catch bag.  The gallbladder fossa was then reinspected and copiously irrigated with saline and hemostasis was ensured.  Due  to the fact that the patient is on dual antiplatelet therapy, I did elect to feel the gallbladder fossa with FloSeal for additional hemostasis. The cystic duct stump and cystic artery stump were reinspected and noted to be secure.  Following this the upper abdominal trocars were removed under direct visualization and no bleeding was noted.  The laparoscope was withdrawn and the abdomen was desufflated.  The Howell cannula was removed out of the umbilical port site followed by the gallbladder which was completely contained within the Endo Catch bag.  This was passed off the field as specimen.  The fascia of the umbilical port site was then closed using a 0 Vicryl suture placed in a figure-of-eight fashion ×2.  The skin of all incisions was closed using a 4-0 Vicryl suture placed in a subcuticular fashion.  0.25% Marcaine was injected into the wounds for postoperative analgesia.  Mastisol and steri strips were applied to the wounds and covered with dry sterile dressings.    There were no immediate complications noted and the procedure was well tolerated by the patient.  All sponge, needle,  and instrument  counts were correct at the conclusion of procedure.  Dr. Baez was present scrubbed for the procedure and its entirety.  The patient was awakened from anesthesia and taken to the recovery room in good condition.

## 2020-01-12 NOTE — ANESTHESIA POSTPROCEDURE EVALUATION
Patient: Ba Concepcion Singh    Procedure Summary     Date:  01/12/20 Room / [facility]    Anesthesia Start:  0915 Anesthesia Stop:      Procedure:  CHOLECYSTECTOMY LAPAROSCOPIC INTRAOPERATIVE CHOLANGIOGRAM (N/A Abdomen) Diagnosis:       Acute cholecystitis      (Acute cholecystitis [K81.0])    Surgeon:  Perla Baez MD Provider:  Lucian Sims CRNA    Anesthesia Type:  general ASA Status:  3 - Emergent          Anesthesia Type: general    Vitals  Pace at 60  Sat 96  /65  Temp 97.5  Resp 20        Post Anesthesia Care and Evaluation    Patient location during evaluation: ICU  Patient participation: complete - patient participated  Level of consciousness: awake and alert  Pain score: 0  Pain management: satisfactory to patient  Airway patency: patent  Anesthetic complications: No anesthetic complications  PONV Status: none  Cardiovascular status: acceptable and stable  Respiratory status: acceptable, face mask and nasal cannula  Hydration status: acceptable

## 2020-01-12 NOTE — ANESTHESIA PREPROCEDURE EVALUATION
Anesthesia Evaluation     Patient summary reviewed and Nursing notes reviewed   NPO Solid Status: > 8 hours  NPO Liquid Status: > 8 hours           Airway   Mallampati: I  TM distance: >3 FB  Neck ROM: full  No difficulty expected  Dental - normal exam   (+) poor dentition        Pulmonary - normal exam   (+) shortness of breath,   Cardiovascular - normal exam  Exercise tolerance: good (4-7 METS)    ECG reviewed  PT is on anticoagulation therapy  Patient on routine beta blocker and Beta blocker given within 24 hours of surgery    (+) pacemaker pacemaker interrogated unknown, hypertension, past MI , CAD, cardiac stents (proximal left circumflex) more than 12 months ago dysrhythmias (sick sinus syndrome),       Neuro/Psych- negative ROS  GI/Hepatic/Renal/Endo    (+)   renal disease CRI, diabetes mellitus type 2,     Musculoskeletal (-) negative ROS    Abdominal  - normal exam    Bowel sounds: normal.   Substance History - negative use     OB/GYN negative ob/gyn ROS         Other        ROS/Med Hx Other: 3. Last Echo: 10/23/2017              1.  Normal left ventricular systolic function, LVEF 56%              2.  Grade 1 diastolic dysfunction              3.  Borderline left atrial dilation.              4.  Mild calcification of the aortic valve without significant stenosis              5.  Mild aortic regurgitation              6.  Mild mitral regurgitation  4. Prior Holter Monitor: None    CXR-FINDINGS: Normal heart size. Slight left basilar atelectasis. No  pneumonia or edema. No effusion or pneumothorax. Dual-lead left pacer is  stable. Surgical clips in the central upper abdomen.     IMPRESSION:  No acute cardiopulmonary process .                  Anesthesia Plan    ASA 3 - emergent     general     intravenous induction     Anesthetic plan, all risks, benefits, and alternatives have been provided, discussed and informed consent has been obtained with: patient.    Plan discussed with attending.

## 2020-01-12 NOTE — PLAN OF CARE
Pt. Received a lap choly today with no complications. Pain is controlled with prn medications. Pt. Has had a few hallucinations from the IV pain medications. Reorients easily. Morphine switched to dilaudid today and has helped some. Pt. Tolerating clear liquids. Nausea has improved. Dressings to lap sites are without drainage. VSS. Paced rhythm on the monitor. Continuing to await a telemetry bed.

## 2020-01-12 NOTE — PROGRESS NOTES
[facility]    Name:  Ba Singh   Age:  84 y.o.  Sex:  male  :  1935  MRN:  [id number]   Visit Number:  [id number]  Admission Date:  1/10/2020  Date Of Service:  20  Primary Care Physician:  Timmy Burrows MD     LOS: 2 days :  Patient Care Team:  Timmy Burrows MD as PCP - General (Family Medicine)  Siddhartha Hicks MD as PCP - Claims Attributed:    Chief Complaint:      Right upper quadrant and epigastric pain.    Subjective / Interval History:     Mr. Singh is currently lying down on the bed and is comfortable except for his right upper quadrant pain.  He does seem to be slightly confused this morning but was able to answer most of my questions.  He did have a temperature spike early this morning at 101.6.  Complains of right upper quadrant pain.  He was seen by Dr. Baez yesterday and has been scheduled for laparoscopic cholecystectomy this morning.  Denies any chest pain or shortness of breath.    He was admitted from the emergency room on 1/10/2020 after his wife brought him with abdominal pain symptoms.  He was noted to have acute cholecystitis on CT of the abdomen associated with elevated white count of 30,000.  Patient was given IV fluids and was started on IV antibiotic therapy with Zosyn.  He was seen by Dr. Baez from surgical services and was scheduled for cholecystectomy the next day.  Patient does have history of coronary artery disease and had a stent in the past.  He was seen by Dr. Luque for preoperative clearance and he was cleared for surgery.  Patient did undergo laparoscopic cholecystectomy on 2020 and tolerated the procedure well.    Review of Systems:     General ROS: Patient denies any fevers, chills or loss of consciousness.  Generalized weakness.  Respiratory ROS: Denies cough or shortness of breath.  Cardiovascular ROS: Denies chest pain or palpitations. No history of exertional chest  pain.  Gastrointestinal ROS: Abdominal pain and nausea.  Neurological ROS: Denies any focal weakness. No loss of consciousness. Denies any numbness.  Dermatological ROS: Denies any redness or pruritis.    Vital Signs:    Temp:  [97.5 °F (36.4 °C)-101.6 °F (38.7 °C)] 97.5 °F (36.4 °C)  Heart Rate:  [60-65] 60  Resp:  [12-14] 14  BP: (106-129)/(39-53) 129/53    Intake and output:    I/O last 3 completed shifts:  In: 4279 [P.O.:360; I.V.:3919]  Out: 625 [Urine:625]  I/O this shift:  In: 600 [I.V.:600]  Out: 100 [Urine:100]    Physical Examination:    General Appearance:  Alert and cooperative, not in any acute distress.   Head:  Atraumatic and normocephalic, without obvious abnormality.   Eyes:          PERRLA, conjunctivae and sclerae normal, no Icterus. No pallor. Extraocular movements are within normal limits.   Neck: Supple, trachea midline, no thyromegaly, no carotid bruit.   Lungs:   Chest shape is normal. Breath sounds heard bilaterally equally.  No crackles or wheezing. No Pleural rub or bronchial breathing.   Heart:  Normal S1 and S2, no murmur, no gallop, no rub. No JVD.  Pacemaker is palpated on the left upper chest.   Abdomen:   Normal bowel sounds, no masses, no organomegaly. Soft, tenderness noted in the right upper quadrant and epigastric region on minimal palpation, non-distended, minimal guarding, no rebound tenderness.  Surgical bandage noted on the abdomen.   Extremities: Moves all extremities well, no edema, no cyanosis, no clubbing.   Skin: No bleeding, bruising or rash.   Neurologic: Awake, alert and oriented times 3. Moves all 4 extremities equally.     Laboratory results:    Results from last 7 days   Lab Units 01/12/20  0415 01/11/20  0435 01/10/20  1934   SODIUM mmol/L 137 138 135*   POTASSIUM mmol/L 4.6 4.5 4.7   CHLORIDE mmol/L 103 99 96*   CO2 mmol/L 22.1 25.4 24.4   BUN mg/dL 28* 24* 23   CREATININE mg/dL 1.88* 1.68* 1.73*   CALCIUM mg/dL 7.6* 8.2* 8.9   BILIRUBIN mg/dL 0.8  --  0.6   ALK  PHOS U/L 172*  --  181*   ALT (SGPT) U/L 96*  --  15   AST (SGOT) U/L 67*  --  26   GLUCOSE mg/dL 155* 130* 247*     Results from last 7 days   Lab Units 01/12/20  0415 01/11/20  0435 01/10/20  1934   WBC 10*3/mm3 25.14* 25.23* 30.42*   HEMOGLOBIN g/dL 10.1* 12.3* 13.3   HEMATOCRIT % 30.6* 37.9 38.5   PLATELETS 10*3/mm3 235 253 415     Results from last 7 days   Lab Units 01/10/20  1934   INR  1.06     Results from last 7 days   Lab Units 01/12/20 0415 01/10/20  1934   TROPONIN T ng/mL 0.016 0.014           I have reviewed the patient's laboratory results.    Radiology results:    Imaging Results (Last 24 Hours)     Procedure Component Value Units Date/Time    FL Cholangiogram Operative [[id number]] Collected:  01/12/20 1123     Updated:  01/12/20 1128    Narrative:       INTRAOPERATIVE CHOLANGIOGRAM     HISTORY: Cholelithiasis and cholecystitis.     PROCEDURE: An intraoperative cholangiogram was performed. Contrast was  injected by the operating physician.  Spot films were obtained.     FINDINGS: The common duct is nondilated. The intrahepatic biliary tree  is normal. There is free spillage of contrast into the duodenum. No  filling defects are identified.       Impression:       No evidence of choledocholithiasis.     Fluoroscopy exposure time: 0.3 minutes.      Total images 4.     This report was finalized on 1/12/2020 11:26 AM by Dr Mele Reeder DO.     Gallbladder [[id number]] Collected:  01/11/20 1551     Updated:  01/11/20 1557    Narrative:       RIGHT UPPER QUADRANT ULTRASOUND     HISTORY: Right upper quadrant pain, possible cholecystitis.     PROCEDURE: Ultrasound images of the right upper quadrant were obtained.     FINDINGS: There is gallbladder wall thickening at approximately 5 mm.  There is debris or sludge in the gallbladder. No definite stone or mural  gas. There is pericholecystic inflammation and trace perihepatic free  fluid.     No obvious liver masses. Mild right renal atrophy. No  biliary  dilatation. Pancreas is mostly obscured by gas artifact.          Impression:       Correlating with CT yesterday, there is evidence of acute  cholecystitis. There is debris or sludge in gallbladder lumen without  discrete stone.     This report was finalized on 1/11/2020 3:54 PM by Dr Mele Reeder DO.        I have reviewed the patient's radiology reports.    Medication Review:     I have reviewed the patients active and prn medications.       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  Sepsis secondary to acute cholecystitis, present on admission, status post laparoscopic cholecystectomy on 1/12/2020.  2.  Acute renal failure, present on admission secondary to sepsis.  3.  Coronary artery disease status post stent in the past.  4.  Sick sinus syndrome status post pacemaker.  5.  Diabetes mellitus type 2 with nephropathy.  6.  Chronic kidney disease stage II.  7.  Acquired hypothyroidism.    Plan:    Mr. Smith is currently hemodynamically stable.  He was seen and examined again after surgery.  He is currently sleeping.  I have discussed the patient's condition with Dr. Baez and she did perform laparoscopic cholecystectomy.  His bladder did look infected.  He will be continued on IV fluids and IV antibiotic therapy with Zosyn.  He will be continued on lactobacillus supplements.      He was seen by Dr. Luque from cardiology yesterday for preoperative clearance.  Patient does not have any chest pain and has had good effort tolerance.  He was cleared the patient for surgery.  We will continue carvedilol and aspirin therapy at this time.  He is currently not on dual antiplatelet agents which is  appropriate.    Patient will be continued on subcutaneous insulin protocol.  He does have acute on chronic kidney disease which hopefully should improve further with IV hydration.  He will be continued on Lovenox for DVT prophylaxis.  Further recommendations depend upon his clinical course.    Alberto Rodriguez MD  01/12/20  2:05 PM    Dictated utilizing Dragon dictation.

## 2020-01-13 LAB
ALBUMIN SERPL-MCNC: 2.5 G/DL (ref 3.5–5.2)
ALBUMIN/GLOB SERPL: 0.8 G/DL
ALP SERPL-CCNC: 147 U/L (ref 39–117)
ALT SERPL W P-5'-P-CCNC: 69 U/L (ref 1–41)
ANION GAP SERPL CALCULATED.3IONS-SCNC: 12.5 MMOL/L (ref 5–15)
AST SERPL-CCNC: 41 U/L (ref 1–40)
BASOPHILS # BLD AUTO: 0.02 10*3/MM3 (ref 0–0.2)
BASOPHILS NFR BLD AUTO: 0.1 % (ref 0–1.5)
BILIRUB SERPL-MCNC: 0.5 MG/DL (ref 0.2–1.2)
BUN BLD-MCNC: 35 MG/DL (ref 8–23)
BUN/CREAT SERPL: 18.2 (ref 7–25)
CALCIUM SPEC-SCNC: 8.1 MG/DL (ref 8.6–10.5)
CHLORIDE SERPL-SCNC: 102 MMOL/L (ref 98–107)
CHLORIDE UR-SCNC: 39 MMOL/L
CO2 SERPL-SCNC: 19.5 MMOL/L (ref 22–29)
CREAT BLD-MCNC: 1.92 MG/DL (ref 0.76–1.27)
CREAT UR-MCNC: 90.7 MG/DL
DEPRECATED RDW RBC AUTO: 42.1 FL (ref 37–54)
EOSINOPHIL # BLD AUTO: 0 10*3/MM3 (ref 0–0.4)
EOSINOPHIL NFR BLD AUTO: 0 % (ref 0.3–6.2)
ERYTHROCYTE [DISTWIDTH] IN BLOOD BY AUTOMATED COUNT: 11.7 % (ref 12.3–15.4)
GFR SERPL CREATININE-BSD FRML MDRD: 34 ML/MIN/1.73
GLOBULIN UR ELPH-MCNC: 3 GM/DL
GLUCOSE BLD-MCNC: 236 MG/DL (ref 65–99)
GLUCOSE BLDC GLUCOMTR-MCNC: 182 MG/DL (ref 70–130)
GLUCOSE BLDC GLUCOMTR-MCNC: 206 MG/DL (ref 70–130)
GLUCOSE BLDC GLUCOMTR-MCNC: 263 MG/DL (ref 70–130)
GLUCOSE BLDC GLUCOMTR-MCNC: 264 MG/DL (ref 70–130)
HCT VFR BLD AUTO: 34.6 % (ref 37.5–51)
HGB BLD-MCNC: 11 G/DL (ref 13–17.7)
IMM GRANULOCYTES # BLD AUTO: 0.24 10*3/MM3 (ref 0–0.05)
IMM GRANULOCYTES NFR BLD AUTO: 1.1 % (ref 0–0.5)
LIPASE SERPL-CCNC: 8 U/L (ref 13–60)
LYMPHOCYTES # BLD AUTO: 0.72 10*3/MM3 (ref 0.7–3.1)
LYMPHOCYTES NFR BLD AUTO: 3.2 % (ref 19.6–45.3)
MAGNESIUM SERPL-MCNC: 1.9 MG/DL (ref 1.6–2.4)
MCH RBC QN AUTO: 31.1 PG (ref 26.6–33)
MCHC RBC AUTO-ENTMCNC: 31.8 G/DL (ref 31.5–35.7)
MCV RBC AUTO: 97.7 FL (ref 79–97)
MONOCYTES # BLD AUTO: 0.89 10*3/MM3 (ref 0.1–0.9)
MONOCYTES NFR BLD AUTO: 3.9 % (ref 5–12)
NEUTROPHILS # BLD AUTO: 20.98 10*3/MM3 (ref 1.7–7)
NEUTROPHILS NFR BLD AUTO: 91.7 % (ref 42.7–76)
NRBC BLD AUTO-RTO: 0 /100 WBC (ref 0–0.2)
OSMOLALITY UR: 627 MOSM/KG
PLATELET # BLD AUTO: 281 10*3/MM3 (ref 140–450)
PMV BLD AUTO: 11.4 FL (ref 6–12)
POTASSIUM BLD-SCNC: 4.8 MMOL/L (ref 3.5–5.2)
PROT SERPL-MCNC: 5.5 G/DL (ref 6–8.5)
RBC # BLD AUTO: 3.54 10*6/MM3 (ref 4.14–5.8)
SODIUM BLD-SCNC: 134 MMOL/L (ref 136–145)
SODIUM UR-SCNC: 56 MMOL/L
TROPONIN T SERPL-MCNC: <0.01 NG/ML (ref 0–0.03)
WBC NRBC COR # BLD: 22.85 10*3/MM3 (ref 3.4–10.8)

## 2020-01-13 PROCEDURE — 99024 POSTOP FOLLOW-UP VISIT: CPT | Performed by: SURGERY

## 2020-01-13 PROCEDURE — 82962 GLUCOSE BLOOD TEST: CPT

## 2020-01-13 PROCEDURE — 82436 ASSAY OF URINE CHLORIDE: CPT | Performed by: INTERNAL MEDICINE

## 2020-01-13 PROCEDURE — 99232 SBSQ HOSP IP/OBS MODERATE 35: CPT | Performed by: FAMILY MEDICINE

## 2020-01-13 PROCEDURE — 25010000002 ENOXAPARIN PER 10 MG: Performed by: SURGERY

## 2020-01-13 PROCEDURE — 82570 ASSAY OF URINE CREATININE: CPT | Performed by: INTERNAL MEDICINE

## 2020-01-13 PROCEDURE — 25010000002 PIPERACILLIN SOD-TAZOBACTAM PER 1 G: Performed by: SURGERY

## 2020-01-13 PROCEDURE — 84484 ASSAY OF TROPONIN QUANT: CPT | Performed by: INTERNAL MEDICINE

## 2020-01-13 PROCEDURE — 83935 ASSAY OF URINE OSMOLALITY: CPT | Performed by: INTERNAL MEDICINE

## 2020-01-13 PROCEDURE — 84300 ASSAY OF URINE SODIUM: CPT | Performed by: INTERNAL MEDICINE

## 2020-01-13 PROCEDURE — 83735 ASSAY OF MAGNESIUM: CPT | Performed by: SURGERY

## 2020-01-13 PROCEDURE — 83690 ASSAY OF LIPASE: CPT | Performed by: SURGERY

## 2020-01-13 PROCEDURE — 80053 COMPREHEN METABOLIC PANEL: CPT | Performed by: SURGERY

## 2020-01-13 PROCEDURE — 85025 COMPLETE CBC W/AUTO DIFF WBC: CPT | Performed by: SURGERY

## 2020-01-13 PROCEDURE — 63710000001 INSULIN ASPART PER 5 UNITS: Performed by: FAMILY MEDICINE

## 2020-01-13 RX ADMIN — PIPERACILLIN SODIUM AND TAZOBACTAM SODIUM 3.38 G: 3; .375 INJECTION, POWDER, FOR SOLUTION INTRAVENOUS at 21:15

## 2020-01-13 RX ADMIN — ENOXAPARIN SODIUM 30 MG: 30 INJECTION SUBCUTANEOUS at 06:06

## 2020-01-13 RX ADMIN — INSULIN ASPART 4 UNITS: 100 INJECTION, SOLUTION INTRAVENOUS; SUBCUTANEOUS at 11:51

## 2020-01-13 RX ADMIN — PIPERACILLIN SODIUM AND TAZOBACTAM SODIUM 3.38 G: 3; .375 INJECTION, POWDER, FOR SOLUTION INTRAVENOUS at 03:47

## 2020-01-13 RX ADMIN — INSULIN ASPART 2 UNITS: 100 INJECTION, SOLUTION INTRAVENOUS; SUBCUTANEOUS at 21:15

## 2020-01-13 RX ADMIN — ASPIRIN 81 MG: 81 TABLET, COATED ORAL at 08:19

## 2020-01-13 RX ADMIN — SODIUM CHLORIDE 125 ML/HR: 9 INJECTION, SOLUTION INTRAVENOUS at 08:18

## 2020-01-13 RX ADMIN — INSULIN ASPART 4 UNITS: 100 INJECTION, SOLUTION INTRAVENOUS; SUBCUTANEOUS at 17:25

## 2020-01-13 RX ADMIN — AMLODIPINE BESYLATE 5 MG: 5 TABLET ORAL at 08:19

## 2020-01-13 RX ADMIN — ACETAMINOPHEN 650 MG: 325 TABLET, FILM COATED ORAL at 06:50

## 2020-01-13 RX ADMIN — PIPERACILLIN SODIUM AND TAZOBACTAM SODIUM 3.38 G: 3; .375 INJECTION, POWDER, FOR SOLUTION INTRAVENOUS at 11:51

## 2020-01-13 RX ADMIN — CARVEDILOL 25 MG: 25 TABLET, FILM COATED ORAL at 08:18

## 2020-01-13 RX ADMIN — Medication 1 CAPSULE: at 21:15

## 2020-01-13 RX ADMIN — INSULIN ASPART 3 UNITS: 100 INJECTION, SOLUTION INTRAVENOUS; SUBCUTANEOUS at 06:50

## 2020-01-13 RX ADMIN — FAMOTIDINE 20 MG: 10 INJECTION, SOLUTION INTRAVENOUS at 08:19

## 2020-01-13 RX ADMIN — CARVEDILOL 25 MG: 25 TABLET, FILM COATED ORAL at 21:15

## 2020-01-13 RX ADMIN — Medication 1 CAPSULE: at 08:18

## 2020-01-13 RX ADMIN — MELATONIN TAB 5 MG 5 MG: 5 TAB at 21:15

## 2020-01-13 NOTE — PLAN OF CARE
Problem: Cholecystectomy (Adult)  Goal: Signs and Symptoms of Listed Potential Problems Will be Absent, Minimized or Managed (Cholecystectomy)  Outcome: Ongoing (interventions implemented as appropriate)     Problem: Fall Risk (Adult)  Goal: Identify Related Risk Factors and Signs and Symptoms  Outcome: Ongoing (interventions implemented as appropriate)

## 2020-01-13 NOTE — PLAN OF CARE
Problem: Patient Care Overview  Goal: Plan of Care Review  Outcome: Ongoing (interventions implemented as appropriate)  Flowsheets (Taken 1/13/2020 5347)  Progress: improving  Plan of Care Reviewed With: patient  Outcome Summary: VSS; no c/o pain throughout night; pt awake all night-slightly confused; tolerating clear liquid diet; continue IVF's and antibiotics

## 2020-01-13 NOTE — PROGRESS NOTES
Continued Stay Note  ALEXANDRA Jacobson     Patient Name: Ba Singh  MRN: [id number]  Today's Date: 1/13/2020    Admit Date: 1/10/2020    Discharge Plan     Row Name 01/13/20 1149       Plan    Plan Comments  Spoke to pt and his wife her repots he had [facility] and was told to come to the local hospital  Discharge plan is to return home with his wife denies any needs         Discharge Codes    No documentation.       Expected Discharge Date and Time     Expected Discharge Date Expected Discharge Time    Jan 14, 2020             Maribel Valle RN

## 2020-01-13 NOTE — PROGRESS NOTES
[facility]    Name:  Ba Singh   Age:  84 y.o.  Sex:  male  :  1935  MRN:  [id number]   Visit Number:  [id number]  Admission Date:  1/10/2020  Date Of Service:  20  Primary Care Physician:  Timmy Burrows MD     LOS: 3 days :      Chief Complaint:  Follow up sepsis with cholecystitis.        Subjective / Interval History: Patient seen today around lunchtime. He has still some intermittent confusion and elevating creatinine. He had improved right upper abdomen pain. He is weak requiring 2 person assist for ambulation. He is status post lap marissa yesterday. He denies fever, chills, nausea, vomiting, chest pain, or shortness of breath.     Per previous attending:  He was admitted from the emergency room on 1/10/2020 after his wife brought him with abdominal pain symptoms.  He was noted to have acute cholecystitis on CT of the abdomen associated with elevated white count of 30,000.  Patient was given IV fluids and was started on IV antibiotic therapy with Zosyn.  He was seen by Dr. Baez from surgical services and was scheduled for cholecystectomy the next day.  Patient does have history of coronary artery disease and had a stent in the past.  He was seen by Dr. Luque for preoperative clearance and he was cleared for surgery.  Patient did undergo laparoscopic cholecystectomy on 2020 and tolerated the procedure well.    Review of Systems:     General ROS: Patient denies any fevers, chills or loss of consciousness. Intermittent but improving confusion. Positive generalized weakness  Ophthalmic ROS: Denies any diplopia or transient loss of vision.  ENT ROS: Denies sore throat, nasal congestion or ear pain.   Respiratory ROS: Denies cough or shortness of breath.  Cardiovascular ROS: Denies chest pain or palpitations. No history of exertional chest pain.   Gastrointestinal ROS: Denies nausea and vomiting. Denies any abdominal pain. No  diarrhea.  Genito-Urinary ROS: Denies dysuria or hematuria.  Musculoskeletal ROS: Denies back pain. No muscle pain. No calf pain.  Neurological ROS: Denies any focal weakness. No loss of consciousness. Denies any numbness. Denies neck pain.   Dermatological ROS: Denies any redness or pruritis.    Vital Signs:    Temp:  [97.8 °F (36.6 °C)-98.1 °F (36.7 °C)] 98 °F (36.7 °C)  Heart Rate:  [60-68] 60  Resp:  [16-18] 18  BP: (136-159)/(46-77) 159/51    Intake and output:    I/O last 3 completed shifts:  In: 6869 [P.O.:840; I.V.:5881; IV Piggyback:148]  Out: 950 [Urine:950]  I/O this shift:  In: 1690 [P.O.:400; I.V.:1190; IV Piggyback:100]  Out: 900 [Urine:900]    Physical Examination:    General Appearance:    Alert and cooperative, not in any acute distress. Mildly anxious and confused. Wife supportive at bedside.   Head:    Atraumatic and normocephalic, without obvious abnormality.   Eyes:            PERRLA, conjunctivae and sclerae normal, no Icterus. No pallor. Extraocular movements are within normal limits.   Throat:   No oral lesions, no thrush, oral mucosa moist.   Neck:   Supple, trachea midline, no thyromegaly, no carotid bruit.   Lungs:     Chest shape is normal. Breath sounds heard bilaterally equally.  No crackles or wheezing. No Pleural rub or bronchial breathing.    Heart:    Normal S1 and S2, no murmur, no gallop\   Abdomen:     Normal bowel sounds, no masses, no organomegaly. Soft        non-tender, non-distended, no guarding, no rebound                Tenderness, obese   Extremities:   Moves all extremities well, trace edema, no cyanosis, no             clubbing   Skin:   No bleeding, bruising or rash.   Neurologic:   No tremor, sensation intact, Motor power is normal and equal bilaterally.   Laboratory results:    Lab Results (last 24 hours)     Procedure Component Value Units Date/Time    Sodium, Urine, Random - [[id number]] Collected:  01/13/20 1725    Specimen:  Urine Updated:  01/13/20 4421      Sodium, Urine 56 mmol/L     Narrative:       Reference intervals for random urine have not been established.  Clinical usage is dependent upon physician's interpretation in combination with other laboratory tests.       Osmolality, Urine - [[id number]] Collected:  01/13/20 1725    Specimen:  Urine Updated:  01/13/20 1751    Chloride, Urine, Random - [[id number]] Collected:  01/13/20 1725    Specimen:  Urine Updated:  01/13/20 1751    Creatinine, Urine, Random - [[id number]] Collected:  01/13/20 1725    Specimen:  Urine Updated:  01/13/20 1751    POC Glucose Once [[id number]]  (Abnormal) Collected:  01/13/20 1640    Specimen:  Blood Updated:  01/13/20 1704     Glucose 263 mg/dL      Comment: Serial Number: [id number]:  [id number]       POC Glucose Once [[id number]]  (Abnormal) Collected:  01/13/20 1114    Specimen:  Blood Updated:  01/13/20 1133     Glucose 264 mg/dL      Comment: Serial Number: [id number]:  [id number]       Tissue Pathology Exam [[id number]] Collected:  01/12/20 0935    Specimen:  Tissue from Gallbladder Updated:  01/13/20 0908    CBC & Differential [[id number]] Collected:  01/13/20 0607    Specimen:  Blood Updated:  01/13/20 0700    Narrative:       The following orders were created for panel order CBC & Differential.  Procedure                               Abnormality         Status                     ---------                               -----------         ------                     CBC Auto Differential[[id number]]        Abnormal            Final result                 Please view results for these tests on the individual orders.    CBC Auto Differential [[id number]]  (Abnormal) Collected:  01/13/20 0607    Specimen:  Blood Updated:  01/13/20 0700     WBC 22.85 10*3/mm3      RBC 3.54 10*6/mm3      Hemoglobin 11.0 g/dL      Hematocrit 34.6 %      MCV 97.7 fL      MCH 31.1 pg      MCHC 31.8 g/dL      RDW 11.7 %      RDW-SD 42.1 fl      MPV 11.4 fL      Platelets 281 10*3/mm3      Neutrophil %  91.7 %      Lymphocyte % 3.2 %      Monocyte % 3.9 %      Eosinophil % 0.0 %      Basophil % 0.1 %      Immature Grans % 1.1 %      Neutrophils, Absolute 20.98 10*3/mm3      Lymphocytes, Absolute 0.72 10*3/mm3      Monocytes, Absolute 0.89 10*3/mm3      Eosinophils, Absolute 0.00 10*3/mm3      Basophils, Absolute 0.02 10*3/mm3      Immature Grans, Absolute 0.24 10*3/mm3      nRBC 0.0 /100 WBC     Comprehensive Metabolic Panel [[id number]]  (Abnormal) Collected:  01/13/20 0607    Specimen:  Blood Updated:  01/13/20 0657     Glucose 236 mg/dL      Comment: Glucose >180, Hemoglobin A1C recommended.        BUN 35 mg/dL      Creatinine 1.92 mg/dL      Sodium 134 mmol/L      Potassium 4.8 mmol/L      Chloride 102 mmol/L      CO2 19.5 mmol/L      Calcium 8.1 mg/dL      Total Protein 5.5 g/dL      Albumin 2.50 g/dL      ALT (SGPT) 69 U/L      AST (SGOT) 41 U/L      Alkaline Phosphatase 147 U/L      Total Bilirubin 0.5 mg/dL      eGFR Non African Amer 34 mL/min/1.73      Globulin 3.0 gm/dL      A/G Ratio 0.8 g/dL      BUN/Creatinine Ratio 18.2     Anion Gap 12.5 mmol/L     Narrative:       GFR Normal >60  Chronic Kidney Disease <60  Kidney Failure <15      POC Glucose Once [[id number]]  (Abnormal) Collected:  01/13/20 0626    Specimen:  Blood Updated:  01/13/20 0656     Glucose 206 mg/dL      Comment: Serial Number: [id number]:  [id number]       Troponin [[id number]]  (Normal) Collected:  01/13/20 0607    Specimen:  Blood Updated:  01/13/20 0652     Troponin T <0.010 ng/mL     Narrative:       Troponin T Reference Range:  <= 0.03 ng/mL-   Negative for AMI  >0.03 ng/mL-     Abnormal for myocardial necrosis.  Clinicians would have to utilize clinical acumen, EKG, Troponin and serial changes to determine if it is an Acute Myocardial Infarction or myocardial injury due to an underlying chronic condition.       Results may be falsely decreased if patient taking Biotin.      Magnesium [[id number]]  (Normal) Collected:  01/13/20  0607    Specimen:  Blood Updated:  01/13/20 0652     Magnesium 1.9 mg/dL     Lipase [[id number]]  (Abnormal) Collected:  01/13/20 0607    Specimen:  Blood Updated:  01/13/20 0652     Lipase 8 U/L     POC Glucose Once [[id number]]  (Abnormal) Collected:  01/12/20 2007    Specimen:  Blood Updated:  01/12/20 2042     Glucose 246 mg/dL      Comment: Serial Number: [id number]:  [id number]             I have reviewed the patient's laboratory results.    Radiology results:    Imaging Results (Last 24 Hours)     ** No results found for the last 24 hours. **          I have reviewed the patient's radiology reports.    Medication Review:     I have reviewed the patients active and prn medications.     Assessment:  1.  Sepsis secondary to acute cholecystitis, present on admission, status post laparoscopic cholecystectomy on 1/12/2020.  2.  Acute renal failure, present on admission secondary to sepsis.  3.  Coronary artery disease status post stent in the past.  4.  Sick sinus syndrome status post pacemaker.  5.  Diabetes mellitus type 2 with nephropathy.  6.  Chronic kidney disease stage II.  7.  Acquired hypothyroidism.      Plan:  Continue to monitor inpatient today. Discussed with Dr Baez. He is post cholecystectomy today.   His pain is improving. Get PT, OT to assist with improving ADLs and weakness. Expect further improvement and DC home in the next 1-2 days.    While patient remains in hospital, I asked nephrology to consult and assist.   Repeat labs in AM for comparison. IV fluid discontinued.  Medication risks and benefits were discussed in detail. Patient reported satisfaction with care delivered and treatment plan.     Michelle Persaud DO  01/13/20  6:52 PM

## 2020-01-13 NOTE — PROGRESS NOTES
Patient: Ba Concepcion Singh  Procedure(s):  CHOLECYSTECTOMY LAPAROSCOPIC INTRAOPERATIVE CHOLANGIOGRAM  Anesthesia type: general    Patient location: [facility] Surgical Floor  Last vitals:   Vitals:    01/13/20 0818   BP: 136/77   Pulse: 63   Resp: 16   Temp: 98 °F (36.7 °C)   SpO2: 96%     Level of consciousness: awake, alert and confused    Post-anesthesia pain: adequate analgesia  Airway patency: patent  Respiratory: nasal cannula  Cardiovascular: stable and blood pressure at baseline  Hydration: euvolemic    Anesthetic complications: no

## 2020-01-13 NOTE — PROGRESS NOTES
LOS: 3 days   Patient Care Team:  Timmy Burrows MD as PCP - General (Family Medicine)  Siddhartha Hicks MD as PCP - Claims Attributed    Chief Complaint:  POD#1 lap marissa with IOC    Subjective     Interval History:     Patient with significantly improved pain overnight.  Has tolerated a diet without nausea or vomiting.  Reports having a soft BM last night.  Has been up in the chair most of the day.  Reports feeling confused.  Reports having visual hallucinations last night.  Per wife, patient is normally A&O x 3.     Objective     Vital Signs  Temp:  [97.4 °F (36.3 °C)-98.1 °F (36.7 °C)] 98 °F (36.7 °C)  Heart Rate:  [60-68] 63  Resp:  [14-18] 16  BP: (114-154)/(46-77) 136/77    Physical Exam:  General Appearance alert, appears stated age and cooperative  Head normocephalic, without obvious abnormality and atraumatic  Eyes lids and lashes normal, conjunctivae and sclerae normal, no icterus, no pallor, corneas clear and PERRLA  Lungs respirations regular, respirations even and respirations unlabored  Abdomen soft, appropriately ttp at the laparoscopic port sites, dressings c/d/i   Extremities moves extremities well, no edema, no cyanosis and no redness  Neurologic Mental Status orientation person, disoriented to situation and place. Endorses occasional visual hallucinations. Cranial Nerves cranial nerves 2 - 12 grossly intact as examined     Results Review:    I reviewed the patient's new clinical results.    Results from last 7 days   Lab Units 01/13/20  0607 01/12/20  0415 01/11/20  0435 01/10/20  1934   SODIUM mmol/L 134* 137 138 135*   POTASSIUM mmol/L 4.8 4.6 4.5 4.7   CHLORIDE mmol/L 102 103 99 96*   CO2 mmol/L 19.5* 22.1 25.4 24.4   BUN mg/dL 35* 28* 24* 23   CREATININE mg/dL 1.92* 1.88* 1.68* 1.73*   CALCIUM mg/dL 8.1* 7.6* 8.2* 8.9   BILIRUBIN mg/dL 0.5 0.8  --  0.6   ALK PHOS U/L 147* 172*  --  181*   ALT (SGPT) U/L 69* 96*  --  15   AST (SGOT) U/L 41* 67*  --  26   GLUCOSE mg/dL 236* 155*  130* 247*       Results from last 7 days   Lab Units 01/13/20  0607 01/12/20  0415 01/11/20  0435   WBC 10*3/mm3 22.85* 25.14* 25.23*   HEMOGLOBIN g/dL 11.0* 10.1* 12.3*   HEMATOCRIT % 34.6* 30.6* 37.9   PLATELETS 10*3/mm3 281 235 253         Medication Review:   Scheduled Meds:  amLODIPine 5 mg Oral Daily   aspirin 81 mg Oral Daily   carvedilol 25 mg Oral Q12H   enoxaparin 30 mg Subcutaneous Q24H   famotidine 20 mg Intravenous Daily   insulin aspart 0-7 Units Subcutaneous 4x Daily AC & at Bedtime   lactobacillus acidophilus 1 capsule Oral BID   piperacillin-tazobactam 3.375 g Intravenous Q8H   sodium chloride 10 mL Intravenous Q12H     Continuous Infusions:  Pharmacy to Dose enoxaparin (LOVENOX)     Pharmacy to Dose Zosyn     sodium chloride 125 mL/hr Last Rate: 125 mL/hr (01/13/20 0818)     PRN Meds:  •  acetaminophen **OR** acetaminophen **OR** acetaminophen  •  bisacodyl  •  dextrose  •  dextrose  •  glucagon (human recombinant)  •  hydrALAZINE  •  HYDROcodone-acetaminophen  •  HYDROmorphone  •  melatonin  •  Morphine  •  ondansetron  •  ondansetron  •  Pharmacy to Dose enoxaparin (LOVENOX)  •  Pharmacy to Dose Zosyn  •  promethazine (PHENERGAN) ivpb  •  sodium chloride  •  sodium chloride      Assessment/Plan       Acute cholecystitis    Essential hypertension    Coronary artery disease involving native coronary artery of native heart without angina pectoris    Sepsis with acute renal failure without septic shock (CMS/HCC)    Sick sinus syndrome (CMS/HCC)    Type 2 diabetes mellitus with nephropathy (CMS/[facility])    Chronic kidney disease, stage II (mild)    Mr. Singh is an 84-year-old male patient who is postoperative day #1 from a laparoscopic cholecystectomy with intraoperative cholangiogram for acute cholecystitis.  He is recovering well.  From a surgical standpoint, he is tolerating a diet without nausea or vomiting, and his pain is appropriate for his postoperative status.  I am concerned about his ongoing  confusion, which is likely related to narcotic pain medications.  However, given that at his baseline he is alert and oriented x3, it is likely to his benefit to remain in-house for another 24 hours to see if his confusion clears.  I would recommend avoiding IV pain medicine and transitioning to oral pain medicine.  His wife reports that he has had these difficulties with IV pain medicine in the past.  Additionally, he is a bit unsteady on his feet and could benefit from being up and ambulating with assistance more frequently.  He may continue with his diet as tolerated.  Trend labs including liver function test and kidney function.  These will need to be followed up in the outpatient setting as well to ensure that they are downtrending appropriately.  Plan of care was discussed with the patient and his wife at the bedside as well as the nursing staff and the hospitalist service.    Perla Baez MD  01/13/20  11:21 AM

## 2020-01-13 NOTE — CONSULTS
Referring Provider: Michelle Persaud DO   Reason for Consultation: KADY    Subjective     Chief complaint   Chief Complaint   Patient presents with   • Abdominal Pain       History of present illness:      83 Y/O WM with PMH of HTN, DMII and CKD III who [facility].  Baseline kidney function is unclear however where his creatinine was 1.3 mg/dL in 2017.  Patient did have history of coronary artery disease status post CABG and sick sinus syndrome status post permanent pacemaker.  Patient came into the hospital with abdominal pain nausea and vomiting he was found to have acute cholecystitis.  Patient underwent cholecystectomy on January 12.  His creatinine on admission was 1.7 mg/dL and today is 1.9 mg/dL.  We were consulted to help with management of his acute kidney injury.  He is tolerating oral intake well.  His abdominal pain is better and he has not moved his bowels and passing gas.      Past Medical History:   Diagnosis Date   • Bradycardia    • Colon polyp    • Diabetes mellitus (CMS/HCC)    • Disease of thyroid gland    • History of stomach ulcers    • Hypertension    • Myocardial infarction (CMS/HCC)      Past Surgical History:   Procedure Laterality Date   • CARDIAC CATHETERIZATION N/A 10/22/2017    Procedure: Coronary angiography;  Surgeon: Siddhartha Hicks MD;  Location: [facility] CATH INVASIVE LOCATION;  Service:    • CARDIAC CATHETERIZATION N/A 10/22/2017    Procedure: Left Heart Cath;  Surgeon: Siddhartha Hicks MD;  Location: [facility] CATH INVASIVE LOCATION;  Service:    • CARDIAC CATHETERIZATION N/A 10/22/2017    Procedure: Angioplasty-coronary;  Surgeon: Siddhartha Hicks MD;  Location: [facility] CATH INVASIVE LOCATION;  Service:    • CHOLECYSTECTOMY WITH INTRAOPERATIVE CHOLANGIOGRAM N/A 1/12/2020    Procedure: CHOLECYSTECTOMY LAPAROSCOPIC INTRAOPERATIVE CHOLANGIOGRAM;  Surgeon: Perla Baez MD;  Location: [facility] OR;  Service: General   • ORTHOPEDIC SURGERY     • PACEMAKER  IMPLANTATION     • [facility] RT/LT HEART CATHETERS N/A 10/22/2017    Procedure: Percutaneous Coronary Intervention;  Surgeon: Siddhartha Hicks MD;  Location: [facility] CATH INVASIVE LOCATION;  Service: Cardiovascular   • ROTATOR CUFF REPAIR Left 2015   • VAGOTOMY AND PYLOROPLASTY  1970    Ex-lap with ulcer repair (suspected Berto patch) with vagotomy and pyloroplasty for perforated peptic ulcer     Family History   Problem Relation Age of Onset   • Heart attack Mother    • Arrhythmia Mother         PACEMAKER   • Hyperlipidemia Mother    • Hypertension Mother    • Thyroid disease Mother    • Stroke Mother    • Obesity Mother      Social History     Tobacco Use   • Smoking status: Former Smoker     Last attempt to quit: 1974     Years since quittin.0   • Smokeless tobacco: Never Used   Substance Use Topics   • Alcohol use: No   • Drug use: No     Medications Prior to Admission   Medication Sig Dispense Refill Last Dose   • amLODIPine (NORVASC) 5 MG tablet Take 1 tablet by mouth Daily. (Patient taking differently: Take 2.5 mg by mouth Daily.) 30 tablet 0 1/10/2020 at Unknown time   • Ascorbic Acid (VITAMIN C PO) Take  by mouth.   1/10/2020   • aspirin 81 MG chewable tablet Chew 1 tablet Daily. 30 tablet 0 1/10/2020 at Unknown time   • carvedilol (COREG) 25 MG tablet Take 1 tablet by mouth 2 (Two) Times a Day. (Patient taking differently: Take 12.5 mg by mouth Daily. Per patient, he takes half a tablet once daily.) 60 tablet 0 1/10/2020 at Unknown time   • [employer] CALCIUM PO Take  by mouth.   1/10/2020   • Multiple Vitamins-Minerals (MULTIVITAMIN WITH MINERALS) tablet tablet Take 1 tablet by mouth Daily.   1/10/2020   • nitroglycerin (NITROSTAT) 0.4 MG SL tablet Place 1 tablet under the tongue Every 5 (Five) Minutes As Needed for Chest Pain. Take no more than 3 doses in 15 minutes. 30 tablet 12 Unknown at Unknown time     Allergies:  Patient has no known allergies.    Review of Systems  Pertinent items are noted in  "HPI.    Objective     Vital Signs  Temp:  [97.8 °F (36.6 °C)-98.1 °F (36.7 °C)] 98 °F (36.7 °C)  Heart Rate:  [60-68] 61  Resp:  [16-18] 16  BP: (121-154)/(46-77) 142/53    Flowsheet Rows      First Filed Value   Admission Height  167.6 cm (66\") Documented at 01/10/2020 1822   Admission Weight  64.4 kg (142 lb) Documented at 01/10/2020 1822           I/O this shift:  In: 500 [P.O.:400; IV Piggyback:100]  Out: 600 [Urine:600]  I/O last 3 completed shifts:  In: 6869 [P.O.:840; I.V.:5881; IV Piggyback:148]  Out: 950 [Urine:950]    Intake/Output Summary (Last 24 hours) at 1/13/2020 1632  Last data filed at 1/13/2020 1300  Gross per 24 hour   Intake 2308 ml   Output 1250 ml   Net 1058 ml       Physical Exam:     General Appearance:    Alert, cooperative, in no acute distress   Head:    Normocephalic, without obvious abnormality, atraumatic   Eyes:            Lids and lashes normal, conjunctivae and sclerae normal, no   icterus, no pallor, corneas clear, PERRLA   Ears:    Ears appear intact with no abnormalities noted   Throat:   No oral lesions, no thrush, oral mucosa moist   Neck:   No adenopathy, supple, trachea midline, no thyromegaly, no   carotid bruit, no JVD   Back:     No kyphosis present, no scoliosis present, no skin lesions,      erythema or scars, no tenderness to percussion or                   palpation,   range of motion normal   Lungs:     Clear to auscultation,respirations regular, even and                  unlabored    Heart:    Regular rhythm and normal rate, normal S1 and S2, no            murmur, no gallop, no rub, no click   Chest Wall:    No abnormalities observed   Abdomen:     Normal bowel sounds, no masses, no organomegaly, soft        non-tender, non-distended, no guarding, no rebound                tenderness   Rectal:     Deferred   Extremities:   Moves all extremities well, no edema, no cyanosis, no             redness   Pulses:   Pulses palpable and equal bilaterally   Skin:   No bleeding, " bruising or rash   Lymph nodes:   No palpable adenopathy   Neurologic:   Cranial nerves 2 - 12 grossly intact, sensation intact, DTR       present and equal bilaterally       Results Review:  Results from last 7 days   Lab Units 01/13/20  0607 01/12/20  0415 01/11/20  0435 01/10/20  1934   SODIUM mmol/L 134* 137 138 135*   POTASSIUM mmol/L 4.8 4.6 4.5 4.7   CHLORIDE mmol/L 102 103 99 96*   CO2 mmol/L 19.5* 22.1 25.4 24.4   BUN mg/dL 35* 28* 24* 23   CREATININE mg/dL 1.92* 1.88* 1.68* 1.73*   CALCIUM mg/dL 8.1* 7.6* 8.2* 8.9   BILIRUBIN mg/dL 0.5 0.8  --  0.6   ALK PHOS U/L 147* 172*  --  181*   ALT (SGPT) U/L 69* 96*  --  15   AST (SGOT) U/L 41* 67*  --  26   GLUCOSE mg/dL 236* 155* 130* 247*       Estimated Creatinine Clearance: 28.5 mL/min (A) (by C-G formula based on SCr of 1.92 mg/dL (H)).    Results from last 7 days   Lab Units 01/13/20  0607 01/12/20  0415 01/10/20  1934   MAGNESIUM mg/dL 1.9 1.8 2.0       Results from last 7 days   Lab Units 01/13/20  0607 01/12/20  0415 01/11/20  0435 01/10/20  1934   WBC 10*3/mm3 22.85* 25.14* 25.23* 30.42*   HEMOGLOBIN g/dL 11.0* 10.1* 12.3* 13.3   PLATELETS 10*3/mm3 281 235 253 415       Results from last 7 days   Lab Units 01/10/20  1934   INR  1.06       Active Medications    amLODIPine 5 mg Oral Daily   aspirin 81 mg Oral Daily   carvedilol 25 mg Oral Q12H   enoxaparin 30 mg Subcutaneous Q24H   famotidine 20 mg Intravenous Daily   insulin aspart 0-7 Units Subcutaneous 4x Daily AC & at Bedtime   lactobacillus acidophilus 1 capsule Oral BID   piperacillin-tazobactam 3.375 g Intravenous Q8H   sodium chloride 10 mL Intravenous Q12H       Pharmacy to Dose enoxaparin (LOVENOX)     Pharmacy to Dose Zosyn     sodium chloride 125 mL/hr Last Rate: 125 mL/hr (01/13/20 0818)       Assessment/Plan   Assessment      Acute cholecystitis    Essential hypertension    Coronary artery disease involving native coronary artery of native heart without angina pectoris    Sepsis with acute  renal failure without septic shock (CMS/HCC)    Sick sinus syndrome (CMS/HCC)    Type 2 diabetes mellitus with nephropathy (CMS/HCC)    Chronic kidney disease, stage II (mild)    - KADY on CKD III: CR is up to 1.9 mg/dl. BLN CR is not clear it was 1.3 mg/dl 2017. No further need for IVF. Will adjust his medication for current CR CL. 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 Will D/C IVF  -  Acute cholecystitis S/P lap marissa  - Coronary artery disease involving native coronary artery of native heart without angina   - DMII      Tiffany Sloan MD  01/13/20  4:32 PM

## 2020-01-14 VITALS
OXYGEN SATURATION: 97 % | HEART RATE: 62 BPM | WEIGHT: 150 LBS | RESPIRATION RATE: 18 BRPM | BODY MASS INDEX: 24.11 KG/M2 | SYSTOLIC BLOOD PRESSURE: 111 MMHG | HEIGHT: 66 IN | TEMPERATURE: 98.2 F | DIASTOLIC BLOOD PRESSURE: 47 MMHG

## 2020-01-14 LAB
ALBUMIN SERPL-MCNC: 2.5 G/DL (ref 3.5–5.2)
ALBUMIN/GLOB SERPL: 0.8 G/DL
ALP SERPL-CCNC: 122 U/L (ref 39–117)
ALT SERPL W P-5'-P-CCNC: 53 U/L (ref 1–41)
ANION GAP SERPL CALCULATED.3IONS-SCNC: 12.5 MMOL/L (ref 5–15)
AST SERPL-CCNC: 27 U/L (ref 1–40)
BASOPHILS # BLD AUTO: 0.03 10*3/MM3 (ref 0–0.2)
BASOPHILS NFR BLD AUTO: 0.1 % (ref 0–1.5)
BILIRUB SERPL-MCNC: 0.4 MG/DL (ref 0.2–1.2)
BUN BLD-MCNC: 38 MG/DL (ref 8–23)
BUN/CREAT SERPL: 20.5 (ref 7–25)
CALCIUM SPEC-SCNC: 7.9 MG/DL (ref 8.6–10.5)
CHLORIDE SERPL-SCNC: 103 MMOL/L (ref 98–107)
CO2 SERPL-SCNC: 17.5 MMOL/L (ref 22–29)
CREAT BLD-MCNC: 1.85 MG/DL (ref 0.76–1.27)
DEPRECATED RDW RBC AUTO: 40.4 FL (ref 37–54)
EOSINOPHIL # BLD AUTO: 0 10*3/MM3 (ref 0–0.4)
EOSINOPHIL NFR BLD AUTO: 0 % (ref 0.3–6.2)
ERYTHROCYTE [DISTWIDTH] IN BLOOD BY AUTOMATED COUNT: 11.7 % (ref 12.3–15.4)
GFR SERPL CREATININE-BSD FRML MDRD: 35 ML/MIN/1.73
GLOBULIN UR ELPH-MCNC: 3.1 GM/DL
GLUCOSE BLD-MCNC: 187 MG/DL (ref 65–99)
GLUCOSE BLDC GLUCOMTR-MCNC: 151 MG/DL (ref 70–130)
GLUCOSE BLDC GLUCOMTR-MCNC: 165 MG/DL (ref 70–130)
GLUCOSE BLDC GLUCOMTR-MCNC: 305 MG/DL (ref 70–130)
HCT VFR BLD AUTO: 34.1 % (ref 37.5–51)
HGB BLD-MCNC: 11.4 G/DL (ref 13–17.7)
IMM GRANULOCYTES # BLD AUTO: 0.15 10*3/MM3 (ref 0–0.05)
IMM GRANULOCYTES NFR BLD AUTO: 0.7 % (ref 0–0.5)
LYMPHOCYTES # BLD AUTO: 1.12 10*3/MM3 (ref 0.7–3.1)
LYMPHOCYTES NFR BLD AUTO: 5.4 % (ref 19.6–45.3)
MCH RBC QN AUTO: 31.6 PG (ref 26.6–33)
MCHC RBC AUTO-ENTMCNC: 33.4 G/DL (ref 31.5–35.7)
MCV RBC AUTO: 94.5 FL (ref 79–97)
MONOCYTES # BLD AUTO: 0.98 10*3/MM3 (ref 0.1–0.9)
MONOCYTES NFR BLD AUTO: 4.7 % (ref 5–12)
NEUTROPHILS # BLD AUTO: 18.61 10*3/MM3 (ref 1.7–7)
NEUTROPHILS NFR BLD AUTO: 89.1 % (ref 42.7–76)
NRBC BLD AUTO-RTO: 0 /100 WBC (ref 0–0.2)
PLATELET # BLD AUTO: 374 10*3/MM3 (ref 140–450)
PMV BLD AUTO: 11 FL (ref 6–12)
POTASSIUM BLD-SCNC: 4.3 MMOL/L (ref 3.5–5.2)
PROT SERPL-MCNC: 5.6 G/DL (ref 6–8.5)
RBC # BLD AUTO: 3.61 10*6/MM3 (ref 4.14–5.8)
SODIUM BLD-SCNC: 133 MMOL/L (ref 136–145)
WBC NRBC COR # BLD: 20.89 10*3/MM3 (ref 3.4–10.8)

## 2020-01-14 PROCEDURE — 80053 COMPREHEN METABOLIC PANEL: CPT | Performed by: FAMILY MEDICINE

## 2020-01-14 PROCEDURE — 25010000002 PIPERACILLIN SOD-TAZOBACTAM PER 1 G: Performed by: SURGERY

## 2020-01-14 PROCEDURE — 97165 OT EVAL LOW COMPLEX 30 MIN: CPT

## 2020-01-14 PROCEDURE — 99238 HOSP IP/OBS DSCHRG MGMT 30/<: CPT | Performed by: FAMILY MEDICINE

## 2020-01-14 PROCEDURE — 85025 COMPLETE CBC W/AUTO DIFF WBC: CPT | Performed by: FAMILY MEDICINE

## 2020-01-14 PROCEDURE — 97161 PT EVAL LOW COMPLEX 20 MIN: CPT

## 2020-01-14 PROCEDURE — 25010000002 ENOXAPARIN PER 10 MG: Performed by: SURGERY

## 2020-01-14 PROCEDURE — 63710000001 INSULIN ASPART PER 5 UNITS: Performed by: FAMILY MEDICINE

## 2020-01-14 PROCEDURE — 25010000002 HYDRALAZINE PER 20 MG: Performed by: SURGERY

## 2020-01-14 PROCEDURE — 82962 GLUCOSE BLOOD TEST: CPT

## 2020-01-14 RX ORDER — AMLODIPINE BESYLATE 5 MG/1
10 TABLET ORAL DAILY
Status: DISCONTINUED | OUTPATIENT
Start: 2020-01-14 | End: 2020-01-14 | Stop reason: HOSPADM

## 2020-01-14 RX ADMIN — FAMOTIDINE 20 MG: 10 INJECTION, SOLUTION INTRAVENOUS at 08:23

## 2020-01-14 RX ADMIN — Medication 1 CAPSULE: at 08:23

## 2020-01-14 RX ADMIN — CARVEDILOL 25 MG: 25 TABLET, FILM COATED ORAL at 08:23

## 2020-01-14 RX ADMIN — ENOXAPARIN SODIUM 30 MG: 30 INJECTION SUBCUTANEOUS at 06:50

## 2020-01-14 RX ADMIN — SODIUM CHLORIDE, PRESERVATIVE FREE 10 ML: 5 INJECTION INTRAVENOUS at 08:24

## 2020-01-14 RX ADMIN — HYDRALAZINE HYDROCHLORIDE 20 MG: 20 INJECTION INTRAMUSCULAR; INTRAVENOUS at 04:15

## 2020-01-14 RX ADMIN — INSULIN ASPART 2 UNITS: 100 INJECTION, SOLUTION INTRAVENOUS; SUBCUTANEOUS at 06:50

## 2020-01-14 RX ADMIN — INSULIN ASPART 5 UNITS: 100 INJECTION, SOLUTION INTRAVENOUS; SUBCUTANEOUS at 11:45

## 2020-01-14 RX ADMIN — AMLODIPINE BESYLATE 10 MG: 5 TABLET ORAL at 08:23

## 2020-01-14 RX ADMIN — ACETAMINOPHEN 650 MG: 325 TABLET, FILM COATED ORAL at 02:28

## 2020-01-14 RX ADMIN — ASPIRIN 81 MG: 81 TABLET, COATED ORAL at 08:23

## 2020-01-14 RX ADMIN — PIPERACILLIN SODIUM AND TAZOBACTAM SODIUM 3.38 G: 3; .375 INJECTION, POWDER, FOR SOLUTION INTRAVENOUS at 04:10

## 2020-01-14 NOTE — PROGRESS NOTES
Continued Stay Note   Jacobson     Patient Name: Ba Singh  MRN: [id number]  Today's Date: 1/14/2020    Admit Date: 1/10/2020    Discharge Plan     Row Name 01/14/20 1412       Plan    Final Discharge Disposition Code  01 - home or self-care    Final Note  Discharged home     Row Name 01/14/20 1402       Plan    Plan Comments  Spoke to pt and his wife regarding the Walker They report they have acess to a walker from a friend and will obtain an order from [facility]         Discharge Codes    No documentation.       Expected Discharge Date and Time     Expected Discharge Date Expected Discharge Time    Jan 14, 2020             Maribel Valle RN

## 2020-01-14 NOTE — PLAN OF CARE
Problem: Patient Care Overview  Goal: Plan of Care Review  Outcome: Ongoing (interventions implemented as appropriate)  Flowsheets (Taken 1/14/2020 0919)  Outcome Summary: Patient participates well in skilled PT evaluation and does not require the skills of PT at this time; although, he does need a RW for safety

## 2020-01-14 NOTE — THERAPY DISCHARGE NOTE
Patient Name: Ba Singh  : 1935    MRN: [id number]                              Today's Date: 2020       Admit Date: 1/10/2020    Visit Dx:     ICD-10-CM ICD-9-CM   1. Acute cholecystitis K81.0 575.0   2. Impaired mobility and ADLs Z74.09 799.89     Patient Active Problem List   Diagnosis   • NSTEMI (non-ST elevated myocardial infarction) (CMS/[facility])   • Essential hypertension   • Bradycardia   • SOB (shortness of breath)   • Coronary artery disease involving native coronary artery of native heart without angina pectoris   • Acute cholecystitis   • Sepsis with acute renal failure without septic shock (CMS/[facility])   • Sick sinus syndrome (CMS/[facility])   • Type 2 diabetes mellitus with nephropathy (CMS/[facility])   • Chronic kidney disease, stage II (mild)     Past Medical History:   Diagnosis Date   • Bradycardia    • Colon polyp    • Diabetes mellitus (CMS/[facility])    • Disease of thyroid gland    • History of stomach ulcers    • Hypertension    • Impaired functional mobility, balance, gait, and endurance    • Myocardial infarction (CMS/[facility])      Past Surgical History:   Procedure Laterality Date   • CARDIAC CATHETERIZATION N/A 10/22/2017    Procedure: Coronary angiography;  Surgeon: Siddhartha Hicks MD;  Location: [facility] CATH INVASIVE LOCATION;  Service:    • CARDIAC CATHETERIZATION N/A 10/22/2017    Procedure: Left Heart Cath;  Surgeon: Siddhartha Hicks MD;  Location: [facility] CATH INVASIVE LOCATION;  Service:    • CARDIAC CATHETERIZATION N/A 10/22/2017    Procedure: Angioplasty-coronary;  Surgeon: Siddhartha Hicks MD;  Location: [facility] CATH INVASIVE LOCATION;  Service:    • CHOLECYSTECTOMY WITH INTRAOPERATIVE CHOLANGIOGRAM N/A 2020    Procedure: CHOLECYSTECTOMY LAPAROSCOPIC INTRAOPERATIVE CHOLANGIOGRAM;  Surgeon: Perla Baez MD;  Location: [facility] OR;  Service: General   • ORTHOPEDIC SURGERY     • PACEMAKER IMPLANTATION     • RI RT/LT HEART CATHETERS N/A 10/22/2017    Procedure: Percutaneous  Coronary Intervention;  Surgeon: Siddhartha Hicks MD;  Location: [facility] CATH INVASIVE LOCATION;  Service: Cardiovascular   • ROTATOR CUFF REPAIR Left 2015   • VAGOTOMY AND PYLOROPLASTY  1970    Ex-lap with ulcer repair (suspected Berto patch) with vagotomy and pyloroplasty for perforated peptic ulcer     General Information     Row Name 01/14/20 0919          PT Evaluation Time/Intention    Document Type  evaluation  -JR     Mode of Treatment  physical therapy  -JR     Row Name 01/14/20 0919          General Information    Patient Profile Reviewed?  yes  -JR     Prior Level of Function  independent:;community mobility  -JR     Existing Precautions/Restrictions  fall  -JR     Barriers to Rehab  none identified  -     Row Name 01/14/20 0919          Relationship/Environment    Lives With  spouse  -     Row Name 01/14/20 0919          Resource/Environmental Concerns    Current Living Arrangements  home/apartment/condo  -     Row Name 01/14/20 0919          Home Main Entrance    Number of Stairs, Main Entrance  other (see comments) 19 wooden steps  -     Stair Railings, Main Entrance  railings on both sides of stairs  -     Row Name 01/14/20 0919          Cognitive Assessment/Intervention- PT/OT    Orientation Status (Cognition)  oriented x 4  -     Row Name 01/14/20 0919          Safety Issues, Functional Mobility    Safety Issues Affecting Function (Mobility)  awareness of need for assistance  -       User Key  (r) = Recorded By, (t) = Taken By, (c) = Cosigned By    Initials Name Provider Type    JR Michelle Zayas, PT Physical Therapist        Mobility     Row Name 01/14/20 0957          Bed Mobility Assessment/Treatment    Comment (Bed Mobility)  Up in chair when PT arrived  -     Row Name 01/14/20 0957          Sit-Stand Transfer    Sit-Stand [place] (Transfers)  supervision  -     Assistive Device (Sit-Stand Transfers)  walker, front-wheeled  -     Row Name 01/14/20 0957           Gait/Stairs Assessment/Training    Gait/Stairs Assessment/Training  gait/ambulation assistive device  -     [place] Level (Gait)  supervision  -     Assistive Device (Gait)  walker, 4-wheeled  -     Distance in Feet (Gait)  500  -JR     Pattern (Gait)  step-through  -       User Key  (r) = Recorded By, (t) = Taken By, (c) = Cosigned By    Initials Name Provider Type    Michelle Lauren, VINAYAK Physical Therapist        Obj/Interventions     [facility] Name 01/14/20 0919          General ROM    GENERAL ROM COMMENTS  BLE= WFL  -[facility] Name 01/14/20 0919          MMT (Manual Muscle Testing)    General MMT Comments  BLE = WFL  -JR     Row Name 01/14/20 0919          Static Sitting Balance    Level of [place] (Unsupported Sitting, Static Balance)  independent  -JR     Row Name 01/14/20 0919          Dynamic Sitting Balance    Level of [place], Reaches Outside Midline (Sitting, Dynamic Balance)  independent  -JR     Row Name 01/14/20 0919          Static Standing Balance    Level of [place] (Supported Standing, Static Balance)  conditional independence  -JR     Row Name 01/14/20 0919          Dynamic Standing Balance    Level of [place], Reaches Outside Midline (Standing, Dynamic Balance)  supervision  -       User Key  (r) = Recorded By, (t) = Taken By, (c) = Cosigned By    Initials Name Provider Type    Michelle Lauren PT Physical Therapist        Goals/Plan    No documentation.       Clinical Impression     [facility] Name 01/14/20 0919          Pain Assessment    Additional Documentation  Pain Scale: Numbers Pre/Post-Treatment (Group)  -JR     Row Name 01/14/20 0919          Pain Scale: Numbers Pre/Post-Treatment    Pain Scale: Numbers, Pretreatment  0/10 - no pain  -     Pain Scale: Numbers, Post-Treatment  0/10 - no pain  -JR     Row Name 01/14/20 0919          Plan of Care Review    Plan of Care Reviewed With  patient  -JR     Row Name 01/14/20 0919          Physical Therapy Clinical  Impression    Patient/Family Goals Statement (PT Clinical Impression)  Patient wants to go home  -JR     Criteria for Skilled Interventions Met (PT Clinical Impression)  no problems identified which require skilled intervention  -JR     Row Name 01/14/20 0919          Vital Signs    Pre SpO2 (%)  95  -JR     O2 Delivery Pre Treatment  supplemental O2 2 LPM  -JR     Intra SpO2 (%)  95  -JR     O2 Delivery Intra Treatment  room air  -JR     Post SpO2 (%)  95  -JR     O2 Delivery Post Treatment  room air  -JR     Pre Patient Position  Sitting  -JR     Intra Patient Position  Standing  -JR     Post Patient Position  Sitting  -JR     Row Name 01/14/20 0919          Positioning and Restraints    Pre-Treatment Position  sitting in chair/recliner  -JR     Post Treatment Position  chair  -JR     In Chair  reclined;call light within reach;encouraged to call for assist  -JR       User Key  (r) = Recorded By, (t) = Taken By, (c) = Cosigned By    Initials Name Provider Type    Michelle Lauren, PT Physical Therapist        Outcome Measures     Row Name 01/14/20 0919          How much help from another person do you currently need...    Turning from your back to your side while in flat bed without using bedrails?  4  -JR     Moving from lying on back to sitting on the side of a flat bed without bedrails?  4  -JR     Moving to and from a bed to a chair (including a wheelchair)?  4  -JR     Standing up from a chair using your arms (e.g., wheelchair, bedside chair)?  4  -JR     Climbing 3-5 steps with a railing?  3  -JR     To walk in hospital room?  4  -JR     AM-PAC 6 Clicks Score (PT)  23  -JR     Row Name 01/14/20 0919          Functional Assessment    Outcome Measure Options  AM-PAC 6 Clicks Basic Mobility (PT)  -JR       User Key  (r) = Recorded By, (t) = Taken By, (c) = Cosigned By    Initials Name Provider Type    Michelle Lauren PT Physical Therapist          PT Recommendation and Plan     Outcome Summary/Treatment Plan  (PT)  Anticipated Discharge Disposition (PT): home  Plan of Care Reviewed With: patient  Outcome Summary: Patient participates well in skilled PT evaluation and does not require the skills of PT at this time; although, he does need a RW for safety     Time Calculation:   PT Charges     Row Name 01/14/20 1011             Time Calculation    Start Time  0919 -JR      PT Received On  01/14/20  -        User Key  (r) = Recorded By, (t) = Taken By, (c) = Cosigned By    Initials Name Provider Type     Michelle Zayas, PT Physical Therapist        Therapy Charges for Today     Code Description Service Date Service Provider Modifiers Qty    [id number]  PT EVAL LOW COMPLEXITY 3 1/14/2020 Michelle Zayas, PT GP 1          PT G-Codes  Outcome Measure Options: AM-PAC 6 Clicks Daily Activity (OT)  AM-PAC 6 Clicks Score (PT): 23  AM-PAC 6 Clicks Score (OT): 22    PT Discharge Summary  Anticipated Discharge Disposition (PT): home    Michelle Zayas, PT  1/14/2020

## 2020-01-14 NOTE — DISCHARGE SUMMARY
[facility]   DISCHARGE SUMMARY      Name:  Ba Singh   Age:  84 y.o.  Sex:  male  :  1935  MRN:  [id number]   Visit Number:  [id number]  Primary Care Physician:  Timmy Burrows MD  Date of Discharge:  2020  Admission Date:  1/10/2020    Discharge Diagnosis:   1.  Sepsis secondary to acute cholecystitis, present on admission, status post laparoscopic cholecystectomy on 2020, treated  2.  Acute renal failure, present on admission secondary to sepsis, improved  3.  Coronary artery disease status post stent in the past  4.  Sick sinus syndrome status post pacemaker  5.  Diabetes mellitus type 2 with nephropathy  6.  Chronic kidney disease stage II  7.  Acquired hypothyroidism      History Of Presenting Illness: The patient is an 84-year-old gentleman with past medical history of coronary artery disease on Brilinta, bradycardia post pacemaker placement, who presents to the emergency room today for abdominal pain.  He states he has a history of abdominal pain in the past which got better at home on its own.  The patient reports having nausea and vomiting with diarrhea for several days prior to evaluation.  He states that the vomiting and diarrhea have resolved but he has persistent nausea and now acute onset abdominal pain since earlier this morning.  He states the abdominal pain is epigastric/right upper quadrant, progressively worsening in severity, 10 out of 10 severe pain, persistent.  He has been given multiple doses of morphine in the ER without improvement.  His lab work showed white blood cell count was elevated at 30.  Lactic acid was elevated. Fortunately, his blood pressure was elevated as he hadn't been able to take all of his home medications. His T-max was 99.3, pulse 60s, respirations 13, blood pressure was elevated at 180s systolic. CT scan showed acute cholecystitis.  He reports trying an ice pack at home on his abdomen without  improvement. He was started on IV normal saline at 125 cc per hour. He was also ordered zosyn. He was diagnosed with sepsis with acute cholecystitis without shock. Dr Baez was consulted for general surgery and hospitalist service called for admission.     Hospital Course:  He was admitted and given IV fluid along with IV Zosyn.  He is clinically improved.  Dr. Baez took the patient to the operating room and performed laparoscopic cholecystectomy on 1/12/2020.  The patient tolerated the procedure well.  He is remained in the hospital due to acute weakness and some confusion.  I suspect he also has underlying dementia.    He completed 5 days of Zosyn while admitted.  He will not require any further antibiotics on discharge.    He will continue tylenol as needed for pain, as wife states pain medication makes him more confused. He isn't requiring much medication for pain anyways. He is ambulating better today, with walker. Walker prescribed. He has improved creatinine. He has improving confusion. Wife agreed to be sure patient follows up with primary care next week. He will also follow up with Dr. Baez.    Consults:     Consults     Date and Time Order Name Status Description    1/13/2020 1249 Inpatient Nephrology Consult Completed     1/10/2020 2321 Inpatient Cardiology Consult Completed     1/10/2020 2320 Inpatient General Surgery Consult Completed           Procedures Performed:    Procedure(s):  CHOLECYSTECTOMY LAPAROSCOPIC INTRAOPERATIVE CHOLANGIOGRAM         Vital Signs:    Temp:  [97.6 °F (36.4 °C)-98.2 °F (36.8 °C)] 98.2 °F (36.8 °C)  Heart Rate:  [60-69] 62  Resp:  [18] 18  BP: (111-185)/(44-65) 111/47    Physical Exam:      General Appearance:   Elderly man.  Alert, cooperative, in no acute distress   Head:    Normocephalic, without obvious abnormality, atraumatic   Eyes:            Lids and lashes normal, conjunctivae and sclerae normal, no   icterus, no pallor, corneas clear, PERRLA   Ears:    Ears appear  intact with no abnormalities noted   Throat:   No oral lesions, no thrush, oral mucosa moist   Neck:   No adenopathy, supple, trachea midline, no thyromegaly   Back:    Defer   Lungs:     Clear to auscultation,respirations regular, even and                  unlabored    Heart:    Regular rhythm and normal rate, normal S1 and S2, no            murmur, no gallop, no rub, no click   Chest Wall:    No abnormalities observed   Abdomen:     Normal bowel sounds, no masses, no organomegaly, soft        non-tender, non-distended, no guarding, no rebound                tenderness, obese   Rectal:     Deferred   Extremities:   Moves all extremities well, no edema, no cyanosis, no             redness   Pulses:   Pulses palpable and equal bilaterally   Skin:   No bleeding, bruising or rash; periumbilical erythema in the shape of his previous postop bandage and postop incisions are otherwise clean dry and intact   Lymph nodes:   No palpable adenopathy   Neurologic:  No tremor, sensation intact, DTR       present and equal bilaterally, answers questions appropriately         Pertinent Lab Results:     Lab Results (all)     Procedure Component Value Units Date/Time    POC Glucose Once [[id number]]  (Abnormal) Collected:  01/14/20 1110    Specimen:  Blood Updated:  01/14/20 1123     Glucose 305 mg/dL      Comment: Serial Number: [id number]:  [id number]       Comprehensive Metabolic Panel [[id number]]  (Abnormal) Collected:  01/14/20 0552    Specimen:  Blood Updated:  01/14/20 0640     Glucose 187 mg/dL      Comment: Glucose >180, Hemoglobin A1C recommended.        BUN 38 mg/dL      Creatinine 1.85 mg/dL      Sodium 133 mmol/L      Potassium 4.3 mmol/L      Chloride 103 mmol/L      CO2 17.5 mmol/L      Calcium 7.9 mg/dL      Total Protein 5.6 g/dL      Albumin 2.50 g/dL      ALT (SGPT) 53 U/L      AST (SGOT) 27 U/L      Alkaline Phosphatase 122 U/L      Total Bilirubin 0.4 mg/dL      eGFR Non African Amer 35 mL/min/1.73       Globulin 3.1 gm/dL      A/G Ratio 0.8 g/dL      BUN/Creatinine Ratio 20.5     Anion Gap 12.5 mmol/L     Narrative:       GFR Normal >60  Chronic Kidney Disease <60  Kidney Failure <15      POC Glucose Once [[id number]]  (Abnormal) Collected:  01/14/20 0617    Specimen:  Blood Updated:  01/14/20 0633     Glucose 165 mg/dL      Comment: Serial Number: [id number]:  [id number]       CBC & Differential [[id number]] Collected:  01/14/20 0552    Specimen:  Blood Updated:  01/14/20 0628    Narrative:       The following orders were created for panel order CBC & Differential.  Procedure                               Abnormality         Status                     ---------                               -----------         ------                     CBC Auto Differential[[id number]]        Abnormal            Final result                 Please view results for these tests on the individual orders.    CBC Auto Differential [[id number]]  (Abnormal) Collected:  01/14/20 0552    Specimen:  Blood Updated:  01/14/20 0628     WBC 20.89 10*3/mm3      RBC 3.61 10*6/mm3      Hemoglobin 11.4 g/dL      Hematocrit 34.1 %      MCV 94.5 fL      MCH 31.6 pg      MCHC 33.4 g/dL      RDW 11.7 %      RDW-SD 40.4 fl      MPV 11.0 fL      Platelets 374 10*3/mm3      Neutrophil % 89.1 %      Lymphocyte % 5.4 %      Monocyte % 4.7 %      Eosinophil % 0.0 %      Basophil % 0.1 %      Immature Grans % 0.7 %      Neutrophils, Absolute 18.61 10*3/mm3      Lymphocytes, Absolute 1.12 10*3/mm3      Monocytes, Absolute 0.98 10*3/mm3      Eosinophils, Absolute 0.00 10*3/mm3      Basophils, Absolute 0.03 10*3/mm3      Immature Grans, Absolute 0.15 10*3/mm3      nRBC 0.0 /100 WBC     POC Glucose Once [[id number]]  (Abnormal) Collected:  01/13/20 2318    Specimen:  Blood Updated:  01/14/20 0135     Glucose 151 mg/dL      Comment: Serial Number: [id number]:  [id number]       Osmolality, Urine - [[id number]] Collected:  01/13/20 1725    Specimen:  Urine  Updated:  01/13/20 2343     Osmolality, Urine 627 mOsm/kg     Narrative:       Osmo Normal Reference Ranges:    Random:  mOsm/kg H2O, depending on fluid intake.  Random: >850 mOsm/kg H20, after 12 hour fluid restriction.    24 Hour: 300-900 mOsm/kg H2O.    Chloride, Urine, Random - [[id number]] Collected:  01/13/20 1725    Specimen:  Urine Updated:  01/13/20 2322     Chloride, Urine 39 mmol/L     Narrative:       Reference intervals for random urine have not been established.  Clinical usage is dependent upon physician's interpretation in combination with other laboratory tests.       Creatinine, Urine, Random - [[id number]] Collected:  01/13/20 1725    Specimen:  Urine Updated:  01/13/20 2321     Creatinine, Urine 90.7 mg/dL     Narrative:       Reference intervals for random urine have not been established.  Clinical usage is dependent upon physician's interpretation in combination with other laboratory tests.       POC Glucose Once [[id number]]  (Abnormal) Collected:  01/13/20 2013    Specimen:  Blood Updated:  01/13/20 2020     Glucose 182 mg/dL      Comment: Serial Number: [id number]:  [id number]       Sodium, Urine, Random - [[id number]] Collected:  01/13/20 1725    Specimen:  Urine Updated:  01/13/20 1829     Sodium, Urine 56 mmol/L     Narrative:       Reference intervals for random urine have not been established.  Clinical usage is dependent upon physician's interpretation in combination with other laboratory tests.       POC Glucose Once [[id number]]  (Abnormal) Collected:  01/13/20 1640    Specimen:  Blood Updated:  01/13/20 1704     Glucose 263 mg/dL      Comment: Serial Number: [id number]:  [id number]       POC Glucose Once [[id number]]  (Abnormal) Collected:  01/13/20 1114    Specimen:  Blood Updated:  01/13/20 1133     Glucose 264 mg/dL      Comment: Serial Number: [id number]:  [id number]       Tissue Pathology Exam [[id number]] Collected:  01/12/20 0935    Specimen:  Tissue from  Gallbladder Updated:  01/13/20 0908    CBC & Differential [[id number]] Collected:  01/13/20 0607    Specimen:  Blood Updated:  01/13/20 0700    Narrative:       The following orders were created for panel order CBC & Differential.  Procedure                               Abnormality         Status                     ---------                               -----------         ------                     CBC Auto Differential[[id number]]        Abnormal            Final result                 Please view results for these tests on the individual orders.    CBC Auto Differential [[id number]]  (Abnormal) Collected:  01/13/20 0607    Specimen:  Blood Updated:  01/13/20 0700     WBC 22.85 10*3/mm3      RBC 3.54 10*6/mm3      Hemoglobin 11.0 g/dL      Hematocrit 34.6 %      MCV 97.7 fL      MCH 31.1 pg      MCHC 31.8 g/dL      RDW 11.7 %      RDW-SD 42.1 fl      MPV 11.4 fL      Platelets 281 10*3/mm3      Neutrophil % 91.7 %      Lymphocyte % 3.2 %      Monocyte % 3.9 %      Eosinophil % 0.0 %      Basophil % 0.1 %      Immature Grans % 1.1 %      Neutrophils, Absolute 20.98 10*3/mm3      Lymphocytes, Absolute 0.72 10*3/mm3      Monocytes, Absolute 0.89 10*3/mm3      Eosinophils, Absolute 0.00 10*3/mm3      Basophils, Absolute 0.02 10*3/mm3      Immature Grans, Absolute 0.24 10*3/mm3      nRBC 0.0 /100 WBC     Comprehensive Metabolic Panel [[id number]]  (Abnormal) Collected:  01/13/20 0607    Specimen:  Blood Updated:  01/13/20 0657     Glucose 236 mg/dL      Comment: Glucose >180, Hemoglobin A1C recommended.        BUN 35 mg/dL      Creatinine 1.92 mg/dL      Sodium 134 mmol/L      Potassium 4.8 mmol/L      Chloride 102 mmol/L      CO2 19.5 mmol/L      Calcium 8.1 mg/dL      Total Protein 5.5 g/dL      Albumin 2.50 g/dL      ALT (SGPT) 69 U/L      AST (SGOT) 41 U/L      Alkaline Phosphatase 147 U/L      Total Bilirubin 0.5 mg/dL      eGFR Non African Amer 34 mL/min/1.73      Globulin 3.0 gm/dL      A/G Ratio 0.8 g/dL       BUN/Creatinine Ratio 18.2     Anion Gap 12.5 mmol/L     Narrative:       GFR Normal >60  Chronic Kidney Disease <60  Kidney Failure <15      POC Glucose Once [[id number]]  (Abnormal) Collected:  01/13/20 0626    Specimen:  Blood Updated:  01/13/20 0656     Glucose 206 mg/dL      Comment: Serial Number: [id number]:  [id number]       Troponin [[id number]]  (Normal) Collected:  01/13/20 0607    Specimen:  Blood Updated:  01/13/20 0652     Troponin T <0.010 ng/mL     Narrative:       Troponin T Reference Range:  <= 0.03 ng/mL-   Negative for AMI  >0.03 ng/mL-     Abnormal for myocardial necrosis.  Clinicians would have to utilize clinical acumen, EKG, Troponin and serial changes to determine if it is an Acute Myocardial Infarction or myocardial injury due to an underlying chronic condition.       Results may be falsely decreased if patient taking Biotin.      Magnesium [[id number]]  (Normal) Collected:  01/13/20 0607    Specimen:  Blood Updated:  01/13/20 0652     Magnesium 1.9 mg/dL     Lipase [[id number]]  (Abnormal) Collected:  01/13/20 0607    Specimen:  Blood Updated:  01/13/20 0652     Lipase 8 U/L     POC Glucose Once [[id number]]  (Abnormal) Collected:  01/12/20 2007    Specimen:  Blood Updated:  01/12/20 2042     Glucose 246 mg/dL      Comment: Serial Number: [id number]:  [id number]       POC Glucose Once [[id number]]  (Abnormal) Collected:  01/12/20 1731    Specimen:  Blood Updated:  01/12/20 1740     Glucose 242 mg/dL      Comment: Serial Number: [id number]:  [id number]       POC Glucose Once [[id number]]  (Abnormal) Collected:  01/12/20 1125    Specimen:  Blood Updated:  01/12/20 1140     Glucose 141 mg/dL      Comment: Serial Number: [id number]:  [id number]       CBC (No Diff) [[id number]]  (Abnormal) Collected:  01/12/20 0415    Specimen:  Blood Updated:  01/12/20 0542     WBC 25.14 10*3/mm3      RBC 3.16 10*6/mm3      Hemoglobin 10.1 g/dL      Hematocrit 30.6 %      MCV 96.8 fL      MCH 32.0 pg       MCHC 33.0 g/dL      RDW 11.9 %      RDW-SD 42.7 fl      MPV 11.1 fL      Platelets 235 10*3/mm3     Comprehensive Metabolic Panel [[id number]]  (Abnormal) Collected:  01/12/20 0415    Specimen:  Blood Updated:  01/12/20 0530     Glucose 155 mg/dL      BUN 28 mg/dL      Creatinine 1.88 mg/dL      Sodium 137 mmol/L      Potassium 4.6 mmol/L      Chloride 103 mmol/L      CO2 22.1 mmol/L      Calcium 7.6 mg/dL      Total Protein 5.4 g/dL      Albumin 2.50 g/dL      ALT (SGPT) 96 U/L      AST (SGOT) 67 U/L      Alkaline Phosphatase 172 U/L      Total Bilirubin 0.8 mg/dL      eGFR Non African Amer 35 mL/min/1.73      Globulin 2.9 gm/dL      A/G Ratio 0.9 g/dL      BUN/Creatinine Ratio 14.9     Anion Gap 11.9 mmol/L     Narrative:       GFR Normal >60  Chronic Kidney Disease <60  Kidney Failure <15      Lipase [[id number]]  (Abnormal) Collected:  01/12/20 0415    Specimen:  Blood Updated:  01/12/20 0529     Lipase 5 U/L     Magnesium [[id number]]  (Normal) Collected:  01/12/20 0415    Specimen:  Blood Updated:  01/12/20 0529     Magnesium 1.8 mg/dL     Troponin [[id number]]  (Normal) Collected:  01/12/20 0415    Specimen:  Blood Updated:  01/12/20 0526     Troponin T 0.016 ng/mL     Narrative:       Troponin T Reference Range:  <= 0.03 ng/mL-   Negative for AMI  >0.03 ng/mL-     Abnormal for myocardial necrosis.  Clinicians would have to utilize clinical acumen, EKG, Troponin and serial changes to determine if it is an Acute Myocardial Infarction or myocardial injury due to an underlying chronic condition.       Results may be falsely decreased if patient taking Biotin.      POC Glucose Once [[id number]]  (Abnormal) Collected:  01/12/20 0514    Specimen:  Blood Updated:  01/12/20 0525     Glucose 146 mg/dL      Comment: Serial Number: [id number]:  [id number]       Lactic Acid, Plasma [[id number]]  (Normal) Collected:  01/12/20 0415    Specimen:  Blood Updated:  01/12/20 0523     Lactate 1.1 mmol/L     POC Glucose Once  [[id number]]  (Abnormal) Collected:  01/11/20 2313    Specimen:  Blood Updated:  01/11/20 2325     Glucose 145 mg/dL      Comment: Serial Number: [id number]:  [id number]       POC Glucose Once [[id number]]  (Abnormal) Collected:  01/11/20 1752    Specimen:  Blood Updated:  01/11/20 1805     Glucose 204 mg/dL      Comment: Serial Number: [id number]:  [id number]       POC Glucose Once [[id number]]  (Normal) Collected:  01/11/20 1125    Specimen:  Blood Updated:  01/11/20 1148     Glucose 118 mg/dL      Comment: Serial Number: [id number]:  [id number]       Basic Metabolic Panel [[id number]]  (Abnormal) Collected:  01/11/20 0435    Specimen:  Blood Updated:  01/11/20 0552     Glucose 130 mg/dL      BUN 24 mg/dL      Creatinine 1.68 mg/dL      Sodium 138 mmol/L      Potassium 4.5 mmol/L      Chloride 99 mmol/L      CO2 25.4 mmol/L      Calcium 8.2 mg/dL      eGFR Non African Amer 39 mL/min/1.73      BUN/Creatinine Ratio 14.3     Anion Gap 13.6 mmol/L     Narrative:       GFR Normal >60  Chronic Kidney Disease <60  Kidney Failure <15      POC Glucose Once [[id number]]  (Abnormal) Collected:  01/11/20 0534    Specimen:  Blood Updated:  01/11/20 0550     Glucose 134 mg/dL      Comment: Serial Number: [id number]:  [id number]       CBC Auto Differential [[id number]]  (Abnormal) Collected:  01/11/20 0435    Specimen:  Blood Updated:  01/11/20 0540     WBC 25.23 10*3/mm3      RBC 3.96 10*6/mm3      Hemoglobin 12.3 g/dL      Hematocrit 37.9 %      MCV 95.7 fL      MCH 31.1 pg      MCHC 32.5 g/dL      RDW 11.6 %      RDW-SD 40.7 fl      MPV 11.5 fL      Platelets 253 10*3/mm3      Neutrophil % 83.4 %      Lymphocyte % 7.5 %      Monocyte % 8.1 %      Eosinophil % 0.0 %      Basophil % 0.2 %      Immature Grans % 0.8 %      Neutrophils, Absolute 21.05 10*3/mm3      Lymphocytes, Absolute 1.89 10*3/mm3      Monocytes, Absolute 2.05 10*3/mm3      Eosinophils, Absolute 0.01 10*3/mm3      Basophils, Absolute 0.04 10*3/mm3       Immature Grans, Absolute 0.19 10*3/mm3      nRBC 0.0 /100 WBC     Lactic Acid, Reflex [[id number]]  (Normal) Collected:  01/11/20 0043    Specimen:  Blood Updated:  01/11/20 0109     Lactate 1.9 mmol/L     Lactic Acid, Reflex Timer (This will reflex a repeat order 3-3:15 hours after ordered.) [[id number]] Collected:  01/10/20 1934    Specimen:  Blood Updated:  01/11/20 0030     Extra Tube Hold for add-ons.     Comment: Auto resulted.       Hemoglobin A1c [[id number]]  (Abnormal) Collected:  01/10/20 1934    Specimen:  Blood Updated:  01/10/20 2355     Hemoglobin A1C 7.60 %     Narrative:       Hemoglobin A1C Ranges:    Increased Risk for Diabetes  5.7% to 6.4%  Diabetes                     >= 6.5%  Diabetic Goal                < 7.0%    Troponin [[id number]]  (Normal) Collected:  01/10/20 1934    Specimen:  Blood Updated:  01/10/20 2354     Troponin T 0.014 ng/mL     Narrative:       Troponin T Reference Range:  <= 0.03 ng/mL-   Negative for AMI  >0.03 ng/mL-     Abnormal for myocardial necrosis.  Clinicians would have to utilize clinical acumen, EKG, Troponin and serial changes to determine if it is an Acute Myocardial Infarction or myocardial injury due to an underlying chronic condition.       Results may be falsely decreased if patient taking Biotin.      Lipid Panel [[id number]]  (Abnormal) Collected:  01/10/20 1934    Specimen:  Blood Updated:  01/10/20 2352     Total Cholesterol 176 mg/dL      Triglycerides 67 mg/dL      HDL Cholesterol 67 mg/dL      LDL Cholesterol  96 mg/dL      VLDL Cholesterol 13.4 mg/dL      LDL/HDL Ratio 1.43    Narrative:       Cholesterol Reference Ranges  (U.S. Department of Health and Human Services ATP III Classifications)    Desirable          <200 mg/dL  Borderline High    200-239 mg/dL  High Risk          >240 mg/dL      Triglyceride Reference Ranges  (U.S. Department of Health and Human Services ATP III Classifications)    Normal           <150 mg/dL  Borderline High  150-199  mg/dL  High             200-499 mg/dL  Very High        >500 mg/dL    HDL Reference Ranges  (U.S. Department of Health and Human Services ATP III Classifcations)    Low     <40 mg/dl (major risk factor for CHD)  High    >60 mg/dl ('negative' risk factor for CHD)        LDL Reference Ranges  (U.S. Department of Health and Human Services ATP III Classifcations)    Optimal          <100 mg/dL  Near Optimal     100-129 mg/dL  Borderline High  130-159 mg/dL  High             160-189 mg/dL  Very High        >189 mg/dL    Magnesium [[id number]]  (Normal) Collected:  01/10/20 1934    Specimen:  Blood Updated:  01/10/20 2352     Magnesium 2.0 mg/dL     Protime-INR [[id number]]  (Normal) Collected:  01/10/20 1934    Specimen:  Blood Updated:  01/10/20 2340     Protime 14.1 Seconds      INR 1.06    Narrative:       Suggested INR therapeutic range for stable oral anticoagulant therapy:    Low Intensity therapy:   1.5-2.0  Moderate Intensity therapy:   2.0-3.0  High Intensity therapy:   2.5-4.0    POC Glucose Once [[id number]]  (Abnormal) Collected:  01/10/20 2320    Specimen:  Blood Updated:  01/10/20 2330     Glucose 230 mg/dL      Comment: Serial Number: [id number]:  [id number]       Lactic Acid, Plasma [[id number]]  (Abnormal) Collected:  01/10/20 1934    Specimen:  Blood Updated:  01/10/20 2118     Lactate 2.2 mmol/L     Daisy Draw [[id number]] Collected:  01/10/20 1934    Specimen:  Blood Updated:  01/10/20 2045    Narrative:       The following orders were created for panel order Daisy Draw.  Procedure                               Abnormality         Status                     ---------                               -----------         ------                     Light Blue Top[[id number]]                                   Final result               Green Top (Gel)[[id number]]                                  Final result               Lavender Top[[id number]]                                     Final result                Gold Top - SST[[id number]]                                   Final result               Green Top (No Gel)[[id number]]                               In process                   Please view results for these tests on the individual orders.    Light Blue Top [[id number]] Collected:  01/10/20 1934    Specimen:  Blood Updated:  01/10/20 2045     Extra Tube hold for add-on     Comment: Auto resulted       Green Top (Gel) [[id number]] Collected:  01/10/20 1934    Specimen:  Blood Updated:  01/10/20 2045     Extra Tube Hold for add-ons.     Comment: Auto resulted.       Lavender Top [[id number]] Collected:  01/10/20 1934    Specimen:  Blood Updated:  01/10/20 2045     Extra Tube hold for add-on     Comment: Auto resulted       Gold Top - SST [[id number]] Collected:  01/10/20 1934    Specimen:  Blood Updated:  01/10/20 2045     Extra Tube Hold for add-ons.     Comment: Auto resulted.       CBC & Differential [[id number]] Collected:  01/10/20 1934    Specimen:  Blood Updated:  01/10/20 2007    Narrative:       The following orders were created for panel order CBC & Differential.  Procedure                               Abnormality         Status                     ---------                               -----------         ------                     CBC Auto Differential[[id number]]        Abnormal            Final result                 Please view results for these tests on the individual orders.    CBC Auto Differential [[id number]]  (Abnormal) Collected:  01/10/20 1934    Specimen:  Blood Updated:  01/10/20 2007     WBC 30.42 10*3/mm3      RBC 4.14 10*6/mm3      Hemoglobin 13.3 g/dL      Hematocrit 38.5 %      MCV 93.0 fL      MCH 32.1 pg      MCHC 34.5 g/dL      RDW 11.6 %      RDW-SD 39.1 fl      MPV 10.6 fL      Platelets 415 10*3/mm3      Neutrophil % 87.8 %      Lymphocyte % 3.8 %      Monocyte % 7.3 %      Eosinophil % 0.0 %      Basophil % 0.2 %      Immature Grans % 0.9 %      Neutrophils, Absolute 26.69 10*3/mm3       Lymphocytes, Absolute 1.16 10*3/mm3      Monocytes, Absolute 2.23 10*3/mm3      Eosinophils, Absolute 0.00 10*3/mm3      Basophils, Absolute 0.06 10*3/mm3      Immature Grans, Absolute 0.28 10*3/mm3      nRBC 0.0 /100 WBC     Scan Slide [[id number]] Collected:  01/10/20 1934    Specimen:  Blood Updated:  01/10/20 2007     RBC Morphology Normal     WBC Morphology Normal     Platelet Estimate Adequate    Lipase [[id number]]  (Abnormal) Collected:  01/10/20 1934    Specimen:  Blood Updated:  01/10/20 2006     Lipase 8 U/L     Comprehensive Metabolic Panel [[id number]]  (Abnormal) Collected:  01/10/20 1934    Specimen:  Blood Updated:  01/10/20 2006     Glucose 247 mg/dL      Comment: Glucose >180, Hemoglobin A1C recommended.        BUN 23 mg/dL      Creatinine 1.73 mg/dL      Sodium 135 mmol/L      Potassium 4.7 mmol/L      Chloride 96 mmol/L      CO2 24.4 mmol/L      Calcium 8.9 mg/dL      Total Protein 7.6 g/dL      Albumin 3.90 g/dL      ALT (SGPT) 15 U/L      AST (SGOT) 26 U/L      Alkaline Phosphatase 181 U/L      Total Bilirubin 0.6 mg/dL      eGFR Non African Amer 38 mL/min/1.73      Globulin 3.7 gm/dL      A/G Ratio 1.1 g/dL      BUN/Creatinine Ratio 13.3     Anion Gap 14.6 mmol/L     Narrative:       GFR Normal >60  Chronic Kidney Disease <60  Kidney Failure <15      Urinalysis, Microscopic Only - Urine, Clean Catch [[id number]]  (Abnormal) Collected:  01/10/20 1929    Specimen:  Urine, Clean Catch Updated:  01/10/20 1955     RBC, UA 6-12 /HPF      WBC, UA 0-2 /HPF      Bacteria, UA Trace /HPF      Squamous Epithelial Cells, UA 0-2 /HPF      Hyaline Casts, UA None Seen /LPF      Methodology Manual Light Microscopy    Urinalysis With Microscopic If Indicated (No Culture) - Urine, Clean Catch [[id number]]  (Abnormal) Collected:  01/10/20 1929    Specimen:  Urine, Clean Catch Updated:  01/10/20 1954     Color, UA Yellow     Appearance, UA Clear     pH, UA 5.5     Specific Gravity, UA 1.022     Glucose,  mg/dL  (Trace)     Ketones, UA Trace     Bilirubin, UA Negative     Blood, UA Trace     Protein, UA 30 mg/dL (1+)     Leuk Esterase, UA Negative     Nitrite, UA Negative     Urobilinogen, UA 0.2 E.U./dL    Green Top (No Gel) [[id number]] Collected:  01/10/20 1934    Specimen:  Blood Updated:  01/10/20 1940          Pertinent Radiology Results:    Imaging Results (All)     Procedure Component Value Units Date/Time    FL Cholangiogram Operative [[id number]] Collected:  01/12/20 1123     Updated:  01/12/20 1128    Narrative:       INTRAOPERATIVE CHOLANGIOGRAM     HISTORY: Cholelithiasis and cholecystitis.     PROCEDURE: An intraoperative cholangiogram was performed. Contrast was  injected by the operating physician.  Spot films were obtained.     FINDINGS: The common duct is nondilated. The intrahepatic biliary tree  is normal. There is free spillage of contrast into the duodenum. No  filling defects are identified.       Impression:       No evidence of choledocholithiasis.     Fluoroscopy exposure time: 0.3 minutes.      Total images 4.     This report was finalized on 1/12/2020 11:26 AM by Dr Mele Reeder DO.    US Gallbladder [[id number]] Collected:  01/11/20 1551     Updated:  01/11/20 1557    Narrative:       RIGHT UPPER QUADRANT ULTRASOUND     HISTORY: Right upper quadrant pain, possible cholecystitis.     PROCEDURE: Ultrasound images of the right upper quadrant were obtained.     FINDINGS: There is gallbladder wall thickening at approximately 5 mm.  There is debris or sludge in the gallbladder. No definite stone or mural  gas. There is pericholecystic inflammation and trace perihepatic free  fluid.     No obvious liver masses. Mild right renal atrophy. No biliary  dilatation. Pancreas is mostly obscured by gas artifact.          Impression:       Correlating with CT yesterday, there is evidence of acute  cholecystitis. There is debris or sludge in gallbladder lumen without  discrete stone.     This report was finalized on  1/11/2020 3:54 PM by Dr Mele Reeder DO.    XR Chest 1 View [[id number]] Collected:  01/11/20 0748     Updated:  01/11/20 0751    Narrative:       PORTABLE CHEST    1/10/2020 10:28 PM      HISTORY: Preop.     COMPARISON: 2017.     FINDINGS: Normal heart size. Slight left basilar atelectasis. No  pneumonia or edema. No effusion or pneumothorax. Dual-lead left pacer is  stable. Surgical clips in the central upper abdomen.       Impression:       No acute cardiopulmonary process .     This report was finalized on 1/11/2020 7:49 AM by Dr Mele Reeder DO.    CT Abdomen Pelvis Without Contrast [[id number]] Collected:  01/10/20 2038     Updated:  01/10/20 2040    Narrative:       FINAL REPORT    TECHNIQUE:  Multiple contiguous transaxial slices through the abdomen and  pelvis were obtained without the intravenous administration of  contrast.    CLINICAL HISTORY:  ruq pain    FINDINGS:  There is no renal or ureteral stone or hydronephrosis. The  bladder is normal in contour.  There is bilateral lower lobe  atelectasis.  The gallbladder is dilated with wall thickening  and stranding of the adjacent fat.  There are no stones by CT.  The liver, spleen, pancreas and adrenal glands appear normal.  The bowel gas pattern is nonobstructive.  There is scattered  diverticulosis without diverticulitis.  There is no evidence for  appendicitis.  The aorta and iliac arteries are calcified.  There are patchy areas of sclerosis and lucency throughout the  visualized bones which may represent a bone marrow replacement  process.  Recommend appropriate clinical follow-up.  Bone scan  may be helpful for further characterization.      Impression:       Findings worrisome for cholecystitis.  Recommend ultrasound and  hepatobiliary scan  nonspecific bony findings    Authenticated by Blanka Lemus MD on 01/10/2020 08:38:44 PM          Condition on Discharge:  Stable        Code status during the hospital stay: Full code      Discharge  Disposition:    Home or Self Care    Discharge Medication:       Discharge Medications      Changes to Medications      Instructions Start Date   amLODIPine 5 MG tablet  Commonly known as:  NORVASC  What changed:  how much to take   5 mg, Oral, Daily      carvedilol 25 MG tablet  Commonly known as:  COREG  What changed:    · how much to take  · when to take this  · additional instructions   25 mg, Oral, 2 Times Daily         Continue These Medications      Instructions Start Date   aspirin 81 MG chewable tablet   81 mg, Oral, Daily      [employer] CALCIUM PO   Oral      multivitamin with minerals tablet tablet   1 tablet, Oral, Daily      nitroglycerin 0.4 MG SL tablet  Commonly known as:  NITROSTAT   0.4 mg, Sublingual, Every 5 Minutes PRN, Take no more than 3 doses in 15 minutes.      VITAMIN C PO   Oral             Discharge Diet:     Diet Instructions     Diet: Consistent Carbohydrate, Cardiac      Discharge Diet:   Consistent Carbohydrate  Cardiac             Activity at Discharge:     Activity Instructions     Activity as Tolerated            Follow-up Appointments:    Future Appointments   Date Time Provider Department Center   2/3/2020 11:00 AM Siddhartha Hicks MD [facility] None     Additional Instructions for the Follow-ups that You Need to Schedule     Discharge Follow-up with Specialty: dr eisenberg; 1 Week   As directed      Specialty:  dr eisenberg    Follow Up:  1 Week               Test Results Pending at Discharge:     Order Current Status    Green Top (No Gel) In process    [place] Draw In process    Tissue Pathology Exam In process             Michelle Persaud DO  01/14/20  1:41 PM      Medication risks and benefits were discussed in great detail. The patient reported being satisfied with the current treatment plan and the care delivered while hospitalized.     Time:  I spent 21 minutes preparing discharge counseling and teaching.

## 2020-01-14 NOTE — PLAN OF CARE
Problem: Patient Care Overview  Goal: Plan of Care Review  Outcome: Ongoing (interventions implemented as appropriate)  Flowsheets (Taken 1/14/2020 7538)  Progress: improving  Plan of Care Reviewed With: patient  Outcome Summary: VSS; pain controlled with prn Tylenol; confusion improving; continue antibiotics and PT; plan for dc to home

## 2020-01-14 NOTE — PLAN OF CARE
Problem: Patient Care Overview  Goal: Plan of Care Review  Outcome: Ongoing (interventions implemented as appropriate)  Flowsheets (Taken 1/14/2020 0920)  Progress: no change  Plan of Care Reviewed With: patient  Outcome Summary: OT evaluation completed.  Pt was able to walk 500' with supervision using RW.  Pt able to perform self care tasks with supervision for safety.  Pt has no skilled OT needs at this time.

## 2020-01-14 NOTE — PROGRESS NOTES
Continued Stay Note  ALEXANDRA Jacobson     Patient Name: Ba Singh  MRN: [id number]  Today's Date: 1/14/2020    Admit Date: 1/10/2020    Discharge Plan     Row Name 01/14/20 1402       Plan    Plan Comments  Spoke to pt and his wife regarding the Walker They report they have access to a walker from a friend and will obtain an order from [facility]         Discharge Codes    No documentation.       Expected Discharge Date and Time     Expected Discharge Date Expected Discharge Time    Jan 14, 2020             Maribel Valle RN

## 2020-01-14 NOTE — PLAN OF CARE
Problem: Cholecystectomy (Adult)  Goal: Signs and Symptoms of Listed Potential Problems Will be Absent, Minimized or Managed (Cholecystectomy)  Outcome: Ongoing (interventions implemented as appropriate)

## 2020-01-14 NOTE — THERAPY DISCHARGE NOTE
Acute Care - Occupational Therapy Initial Eval/Discharge   Jacobson     Patient Name: Ba Singh  : 1935  MRN: [id number]  Today's Date: 2020  Onset of Illness/Injury or Date of Surgery: 01/10/20  Date of Referral to OT: 20  Referring Physician: Dr. Persaud      Admit Date: 1/10/2020       ICD-10-CM ICD-9-CM   1. Acute cholecystitis K81.0 575.0   2. Impaired mobility and ADLs Z74.09 799.89     Patient Active Problem List   Diagnosis   • NSTEMI (non-ST elevated myocardial infarction) (CMS/[facility])   • Essential hypertension   • Bradycardia   • SOB (shortness of breath)   • Coronary artery disease involving native coronary artery of native heart without angina pectoris   • Acute cholecystitis   • Sepsis with acute renal failure without septic shock (CMS/[facility])   • Sick sinus syndrome (CMS/[facility])   • Type 2 diabetes mellitus with nephropathy (CMS/[facility])   • Chronic kidney disease, stage II (mild)     Past Medical History:   Diagnosis Date   • Bradycardia    • Colon polyp    • Diabetes mellitus (CMS/[facility])    • Disease of thyroid gland    • History of stomach ulcers    • Hypertension    • Impaired functional mobility, balance, gait, and endurance    • Myocardial infarction (CMS/[facility])      Past Surgical History:   Procedure Laterality Date   • CARDIAC CATHETERIZATION N/A 10/22/2017    Procedure: Coronary angiography;  Surgeon: Siddhartha Hicks MD;  Location: [facility] INVASIVE LOCATION;  Service:    • CARDIAC CATHETERIZATION N/A 10/22/2017    Procedure: Left Heart Cath;  Surgeon: Siddhartha Hicks MD;  Location: [facility] INVASIVE LOCATION;  Service:    • CARDIAC CATHETERIZATION N/A 10/22/2017    Procedure: Angioplasty-coronary;  Surgeon: Siddhartha Hicks MD;  Location: [facility] CATH INVASIVE LOCATION;  Service:    • CHOLECYSTECTOMY WITH INTRAOPERATIVE CHOLANGIOGRAM N/A 2020    Procedure: CHOLECYSTECTOMY LAPAROSCOPIC INTRAOPERATIVE CHOLANGIOGRAM;  Surgeon: Perla Baez MD;  Location:   [facility];  Service: General   • ORTHOPEDIC SURGERY     • PACEMAKER IMPLANTATION     • OK RT/LT HEART CATHETERS N/A 10/22/2017    Procedure: Percutaneous Coronary Intervention;  Surgeon: Siddhartha Hicks MD;  Location: [facility] CATH INVASIVE LOCATION;  Service: Cardiovascular   • ROTATOR CUFF REPAIR Left 2015   • VAGOTOMY AND PYLOROPLASTY  1970    Ex-lap with ulcer repair (suspected Berto patch) with vagotomy and pyloroplasty for perforated peptic ulcer          OT ASSESSMENT FLOWSHEET (last 12 hours)      Occupational Therapy Evaluation     Row Name 01/14/20 0920                   OT Evaluation Time/Intention    Subjective Information  no complaints  -        Document Type  discharge evaluation/summary  -        Mode of Treatment  occupational therapy  -        Patient Effort  good  -        Symptoms Noted During/After Treatment  none  -           General Information    Patient Profile Reviewed?  yes  -        Onset of Illness/Injury or Date of Surgery  01/10/20  -        Referring Physician  Dr. Persaud  -        Patient Observations  alert;cooperative;agree to therapy  -        Patient/Family Observations  Pt alone  -        General Observations of Patient  Pt received sitting reclined in her chair  -        Prior Level of Function  independent:;community mobility;ADL's  -        Equipment Currently Used at Home  none has a straight cane  -        Pertinent History of Current Functional Problem  acute cholecystitis s/p lap marissa  -        Risks Reviewed  patient:;increased discomfort  -        Benefits Reviewed  patient:;improve function;increase independence;increase strength  -           Relationship/Environment    Lives With  spouse  -           Resource/Environmental Concerns    Current Living Arrangements  home/apartment/condo  -           Home Main Entrance    Number of Stairs, Main Entrance  other (see comments) 19  -        Stair Railings, Main Entrance  railings on both  sides of stairs  -           Cognitive Assessment/Intervention- PT/OT    Orientation Status (Cognition)  oriented x 4  -        Follows Commands (Cognition)  WFL  -        Safety Deficit (Cognitive)  awareness of need for assistance;safety precautions awareness;safety precautions follow-through/compliance  -           Safety Issues, Functional Mobility    Safety Issues Affecting Function (Mobility)  awareness of need for assistance  -        Impairments Affecting Function (Mobility)  endurance/activity tolerance  -           Bed Mobility Assessment/Treatment    Comment (Bed Mobility)  Pt sitting up in chair   -           Functional Mobility    Functional Mobility- Ind. Level  supervision required  -        Functional Mobility- Device  rolling walker  -        Functional Mobility-Distance (Feet)  500  -           Transfer Assessment/Treatment    Transfer Assessment/Treatment  sit-stand transfer;stand-sit transfer  -           Sit-Stand Transfer    Sit-Stand [place] (Transfers)  supervision  -           Stand-Sit Transfer    Stand-Sit [place] (Transfers)  supervision  -           ADL Assessment/Intervention    BADL Assessment/Intervention  bathing;upper body dressing;lower body dressing;grooming;feeding;toileting  -           Bathing Assessment/Intervention    Bathing [place] Level  contact guard assist  -           Upper Body Dressing Assessment/Training    Upper Body Dressing [place] Level  independent  -           Lower Body Dressing Assessment/Training    Lower Body Dressing [place] Level  supervision  -           Grooming Assessment/Training    [place] Level (Grooming)  set up  -           Self-Feeding Assessment/Training    [place] Level (Feeding)  set up  -           Toileting Assessment/Training    [place] Level (Toileting)  supervision  -           BADL Safety/Performance    Impairments, BADL Safety/Performance  endurance/activity  tolerance  -           General ROM    GENERAL ROM COMMENTS  [facility] WFL  -           MMT (Manual Muscle Testing)    General MMT Comments  [facility]  -           Positioning and Restraints    Pre-Treatment Position  sitting in chair/recliner  -        Post Treatment Position  chair  -        In Chair  reclined;call light within reach;encouraged to call for assist;exit alarm on  -           Pain Assessment    Additional Documentation  Pain Scale: Numbers Pre/Post-Treatment (Group)  -           Pain Scale: Numbers Pre/Post-Treatment    Pain Scale: Numbers, Pretreatment  0/10 - no pain  -        Pain Scale: Numbers, Post-Treatment  0/10 - no pain  -           Wound 01/12/20 umbilical area Incision    Wound - Properties Group Date first assessed: 01/12/20  -JS Present on Hospital Admission: N  -JS Location: umbilical area  -JS Primary Wound Type: Incision  -JS, times 4        Coping    Observed Emotional State  accepting;cooperative  -        Verbalized Emotional State  acceptance  -           Plan of Care Review    Plan of Care Reviewed With  patient  -        Progress  no change  -        Outcome Summary  OT evaluation completed.  Pt was able to walk 500' with supervision using RW.  Pt able to perform self care tasks with supervision for safety.  Pt has no skilled OT needs at this time.  -           Clinical Impression (OT)    Date of Referral to OT  01/13/20  -        Patient/Family Goals Statement (OT Eval)  Pt wants to d/c home  -        Criteria for Skilled Therapeutic Interventions Met (OT Eval)  no problems identified which require skilled intervention  -        Therapy Frequency (OT Eval)  evaluation only  -        Care Plan Review (OT)  evaluation/treatment results reviewed;patient/other agree to care plan  -        Anticipated Equipment Needs at Discharge (OT)  front wheeled walker  -        Anticipated Discharge Disposition (OT)  home  -           Vital Signs    Pre SpO2  (%)  95  -AH        O2 Delivery Pre Treatment  supplemental O2 2L  -AH        Intra SpO2 (%)  95  -AH        O2 Delivery Intra Treatment  room air  -AH        Post SpO2 (%)  95  -AH        O2 Delivery Post Treatment  room air  -AH           Living Environment    Home Accessibility  stairs to enter home  -          User Key  (r) = Recorded By, (t) = Taken By, (c) = Cosigned By    Initials Name Effective Dates    Alannah Lezama 03/07/18 -     Michelle Lutz RN 07/14/16 -               OT Recommendation and Plan  Outcome Summary/Treatment Plan (OT)  Anticipated Equipment Needs at Discharge (OT): front wheeled walker  Anticipated Discharge Disposition (OT): home  Therapy Frequency (OT Eval): evaluation only  Plan of Care Review  Plan of Care Reviewed With: patient  Plan of Care Reviewed With: patient  Outcome Summary: OT evaluation completed.  Pt was able to walk 500' with supervision using RW.  Pt able to perform self care tasks with supervision for safety.  Pt has no skilled OT needs at this time.         Outcome Measures     Row Name 01/14/20 0920             How much help from another is currently needed...    Putting on and taking off regular lower body clothing?  3  -AH      Bathing (including washing, rinsing, and drying)  3  -AH      Toileting (which includes using toilet bed pan or urinal)  4  -AH      Putting on and taking off regular upper body clothing  4  -AH      Taking care of personal grooming (such as brushing teeth)  4  -AH      Eating meals  4  -AH      AM-PAC 6 Clicks Score (OT)  22  -AH         Functional Assessment    Outcome Measure Options  AM-PAC 6 Clicks Daily Activity (OT)  -        User Key  (r) = Recorded By, (t) = Taken By, (c) = Cosigned By    Initials Name Provider Type    Alannah Lezama Occupational Therapist          Time Calculation:   Time Calculation- OT     Row Name 01/14/20 0956             Time Calculation- OT    OT Start Time  0920  -      OT Received On  01/14/20   -        User Key  (r) = Recorded By, (t) = Taken By, (c) = Cosigned By    Initials Name Provider Type    Alannah Lezama Occupational Therapist        Therapy Suggested Charges     Code   Minutes Charges    None           Therapy Charges for Today     Code Description Service Date Service Provider Modifiers Qty    [id number] HC OT EVAL LOW COMPLEXITY 2 1/14/2020 Alannah Campos GO 1               OT Discharge Summary  Anticipated Discharge Disposition (OT): home    Alannah Campos  1/14/2020

## 2020-01-15 ENCOUNTER — READMISSION MANAGEMENT (OUTPATIENT)
Dept: CALL CENTER | Facility: HOSPITAL | Age: 85
End: 2020-01-15

## 2020-01-16 LAB
LAB AP CASE REPORT: NORMAL
PATH REPORT.FINAL DX SPEC: NORMAL

## 2020-01-17 ENCOUNTER — READMISSION MANAGEMENT (OUTPATIENT)
Dept: CALL CENTER | Facility: HOSPITAL | Age: 85
End: 2020-01-17

## 2020-01-17 NOTE — OUTREACH NOTE
General Surgery Week 1 Survey      Responses   Facility patient discharged from?  [facility]   Does the patient have one of the following disease processes/diagnoses(primary or secondary)?  General Surgery   Is there a successful TCM telephone encounter documented?  No   Week 1 attempt successful?  Yes   Call start time  1103   Call end time  1117   Discharge diagnosis  Sepsis secondary to acute cholecystitis, present on admission, status post laparoscopic cholecystectomy on 1/12/2020, treated   Meds reviewed with patient/caregiver?  Yes   Is the patient having any side effects they believe may be caused by any medication additions or changes?  No   Does the patient have all medications related to this admission filled (includes all antibiotics, pain medications, etc.)  Yes   Is the patient taking all medications as directed (includes completed medication regime)?  Yes   Does the patient have a follow up appointment scheduled with their surgeon?  Yes [Has followup with surgeon on Jan 20 2020]   Has the patient kept scheduled appointments due by today?  N/A   What is the Home health agency?    nurse coming to see patient on Sunday   Has home health visited the patient within 72 hours of discharge?  Yes   What DME was ordered?  Walker    Has all DME been delivered?  No   DME comments  Patient has borrowing one from friend-he will [facility] one from [facility].    Psychosocial issues?  No   Did the patient receive a copy of their discharge instructions?  Yes   Nursing interventions  Reviewed instructions with patient   What is the patient's perception of their health status since discharge?  Improving   Nursing interventions  Nurse provided patient education   Is the patient /caregiver able to teach back basic post-op care?  Drive as instructed by MD in discharge instructions, Take showers only when approved by MD-sponge bathe until then, No tub bath, swimming, or hot tub until instructed by MD, Keep incision areas  clean,dry and protected, Lifting as instructed by MD in discharge instructions   Is the patient/caregiver able to teach back signs and symptoms of incisional infection?  Increased redness, swelling or pain at the incisonal site, Increased drainage or bleeding, Incisional warmth, Pus or odor from incision, Fever   Is the patient/caregiver able to teach back steps to recovery at home?  Set small, achievable goals for return to baseline health, Rest and rebuild strength, gradually increase activity, Make a list of questions for surgeon's appointment   Is the patient/caregiver able to teach back the hierarchy of who to call/visit for symptoms/problems? PCP, Specialist, Home health nurse, Urgent Care, ED, 911  Yes   Week 1 call completed?  Yes          Joel Woo RN

## 2020-01-23 ENCOUNTER — OFFICE VISIT (OUTPATIENT)
Dept: SURGERY | Facility: CLINIC | Age: 85
End: 2020-01-23

## 2020-01-23 VITALS
OXYGEN SATURATION: 100 % | HEART RATE: 84 BPM | HEIGHT: 66 IN | DIASTOLIC BLOOD PRESSURE: 80 MMHG | BODY MASS INDEX: 24.09 KG/M2 | WEIGHT: 149.91 LBS | SYSTOLIC BLOOD PRESSURE: 182 MMHG | TEMPERATURE: 98 F

## 2020-01-23 DIAGNOSIS — C23: ICD-10-CM

## 2020-01-23 DIAGNOSIS — Z90.49 S/P LAPAROSCOPIC CHOLECYSTECTOMY: Primary | ICD-10-CM

## 2020-01-23 PROCEDURE — 99024 POSTOP FOLLOW-UP VISIT: CPT | Performed by: SURGERY

## 2020-01-24 ENCOUNTER — READMISSION MANAGEMENT (OUTPATIENT)
Dept: CALL CENTER | Facility: HOSPITAL | Age: 85
End: 2020-01-24

## 2020-01-24 NOTE — OUTREACH NOTE
General Surgery Week 2 Survey      Responses   Facility patient discharged from?  [facility]   Does the patient have one of the following disease processes/diagnoses(primary or secondary)?  General Surgery   Week 2 attempt successful?  Yes   Call start time  1201   Call end time  1204   Discharge diagnosis  Sepsis secondary to acute cholecystitis, present on admission, status post laparoscopic cholecystectomy on 1/12/2020, treated   Is patient permission given to speak with other caregiver?  Yes   List who call center can speak with  wife   Meds reviewed with patient/caregiver?  Yes   Is the patient taking all medications as directed (includes completed medication regime)?  Yes   Does the patient have a follow up appointment scheduled with their surgeon?  Yes   Has the patient kept scheduled appointments due by today?  Yes   Psychosocial issues?  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 Denies pain/discomfort, no issues with appetite, voiding or other.    What is the patient's perception of their health status since discharge?  Improving   Is the patient /caregiver able to teach back basic post-op care?  Continue use of incentive spirometry at least 1 week post discharge, Drive as instructed by MD in discharge instructions, No tub bath, swimming, or hot tub until instructed by MD, Lifting as instructed by MD in discharge instructions   Is the patient/caregiver able to teach back signs and symptoms of incisional infection?  Pus or odor from incision, Increased drainage or bleeding   Is the patient/caregiver able to teach back steps to recovery at home?  Set small, achievable goals for return to baseline health, Rest and rebuild strength, gradually increase activity   Is the patient/caregiver able to teach back the hierarchy of who to call/visit for symptoms/problems? PCP, Specialist, Home health nurse, Urgent Care, ED, 911  Yes   Week 2 call completed?   Yes          Liliana Leary RN

## 2020-01-31 ENCOUNTER — READMISSION MANAGEMENT (OUTPATIENT)
Dept: CALL CENTER | Facility: HOSPITAL | Age: 85
End: 2020-01-31

## 2020-01-31 NOTE — OUTREACH NOTE
General Surgery Week 3 Survey      Responses   Facility patient discharged from?  [facility]   Does the patient have one of the following disease processes/diagnoses(primary or secondary)?  General Surgery   Week 3 attempt successful?  Yes   Call start time  1753   Call end time  1755   Discharge diagnosis  Sepsis secondary to acute cholecystitis, present on admission, status post laparoscopic cholecystectomy on 1/12/2020, treated   Meds reviewed with patient/caregiver?  Yes   Is the patient taking all medications as directed (includes completed medication regime)?  Yes   Has the patient kept scheduled appointments due by today?  Yes   Comments  [facility]   What DME was ordered?  Pt didn't need walker   What is the patient's perception of their health status since discharge?  Improving   Nursing interventions  Nurse provided patient education   Is the patient/caregiver able to teach back signs and symptoms of incisional infection?  Fever   Is the patient/caregiver able to teach back steps to recovery at home?  Eat a well-balance diet   If the patient is a current smoker, are they able to teach back resources for cessation?  -- [Nonsmoker]   Week 3 call completed?  Yes          Winnie Crowder RN

## 2020-02-03 ENCOUNTER — OFFICE VISIT (OUTPATIENT)
Dept: CARDIOLOGY | Facility: CLINIC | Age: 85
End: 2020-02-03

## 2020-02-03 VITALS
OXYGEN SATURATION: 98 % | HEART RATE: 61 BPM | BODY MASS INDEX: 21.53 KG/M2 | WEIGHT: 134 LBS | DIASTOLIC BLOOD PRESSURE: 60 MMHG | HEIGHT: 66 IN | SYSTOLIC BLOOD PRESSURE: 126 MMHG

## 2020-02-03 DIAGNOSIS — E11.21 TYPE 2 DIABETES MELLITUS WITH NEPHROPATHY (HCC): ICD-10-CM

## 2020-02-03 DIAGNOSIS — I49.5 SICK SINUS SYNDROME (HCC): ICD-10-CM

## 2020-02-03 DIAGNOSIS — I10 ESSENTIAL HYPERTENSION: ICD-10-CM

## 2020-02-03 DIAGNOSIS — I25.10 CORONARY ARTERY DISEASE INVOLVING NATIVE CORONARY ARTERY OF NATIVE HEART WITHOUT ANGINA PECTORIS: Primary | ICD-10-CM

## 2020-02-03 DIAGNOSIS — Z01.810 PRE-OPERATIVE CARDIOVASCULAR EXAMINATION: ICD-10-CM

## 2020-02-03 PROCEDURE — 99214 OFFICE O/P EST MOD 30 MIN: CPT | Performed by: INTERNAL MEDICINE

## 2020-02-03 NOTE — PROGRESS NOTES
Subjective:     Encounter Date:02/03/2020      Patient ID: Ba Singh is a 84 y.o. male.    Chief Complaint: Preoperative cardiovascular evaluation  HPI  This is an 84-year-old male patient who presents to cardiology clinic for routine follow-up after recent hospitalization for severe acute cholecystitis with gangrenous gallbladder.  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  He now requires further more extensive tumor resection.  The patient has done well from a cardiovascular standpoint since released from the hospital.  He had no cardiovascular issues or complications during his gallbladder removal surgery.  The patient has no chest discomfort at rest or with activity.  There is no exertional chest arm neck jaw shoulder or back discomfort.  There is no orthopnea PND or lower extremity edema.  There is no dizziness palpitations or syncope.  He has no shortness of breath at rest or with activity.  He is a non-smoker.  He reports compliance with his medications.  The following portions of the patient's history were reviewed and updated as appropriate: allergies, current medications, past family history, past medical history, past social history, past surgical history and problem  Review of Systems   Constitution: Negative for chills, diaphoresis, fever, malaise/fatigue, weight gain and weight loss.   HENT: Negative for ear discharge, hearing loss, hoarse voice and nosebleeds.    Eyes: Negative for discharge, double vision, pain and photophobia.   Cardiovascular: Negative for chest pain, claudication, cyanosis, dyspnea on exertion, irregular heartbeat, leg swelling, near-syncope, orthopnea, palpitations, paroxysmal nocturnal dyspnea and syncope.   Respiratory: Negative for cough, hemoptysis, shortness of breath, sputum production and wheezing.    Endocrine: Negative for cold intolerance, heat intolerance, polydipsia,  polyphagia and polyuria.   Hematologic/Lymphatic: Negative for adenopathy and bleeding problem. Does not bruise/bleed easily.   Skin: Negative for color change, flushing, itching and rash.   Musculoskeletal: Negative for muscle cramps, muscle weakness, myalgias and stiffness.   Gastrointestinal: Negative for abdominal pain, diarrhea, hematemesis, hematochezia, nausea and vomiting.   Genitourinary: Negative for dysuria, frequency and nocturia.   Neurological: Negative for focal weakness, loss of balance, numbness, paresthesias and seizures.   Psychiatric/Behavioral: Negative for altered mental status, hallucinations and suicidal ideas.   Allergic/Immunologic: Negative for HIV exposure, hives and persistent infections.           Current Outpatient Medications:   •  amLODIPine (NORVASC) 5 MG tablet, Take 1 tablet by mouth Daily. (Patient taking differently: Take 2.5 mg by mouth Daily.), Disp: 30 tablet, Rfl: 0  •  Ascorbic Acid (VITAMIN C PO), Take  by mouth., Disp: , Rfl:   •  aspirin 81 MG chewable tablet, Chew 1 tablet Daily., Disp: 30 tablet, Rfl: 0  •  carvedilol (COREG) 25 MG tablet, Take 1 tablet by mouth 2 (Two) Times a Day. (Patient taking differently: Take 12.5 mg by mouth Daily. Per patient, he takes half a tablet once daily.), Disp: 60 tablet, Rfl: 0  •  [employer] CALCIUM PO, Take  by mouth., Disp: , Rfl:   •  Multiple Vitamins-Minerals (MULTIVITAMIN WITH MINERALS) tablet tablet, Take 1 tablet by mouth Daily., Disp: , Rfl:   •  nitroglycerin (NITROSTAT) 0.4 MG SL tablet, Place 1 tablet under the tongue Every 5 (Five) Minutes As Needed for Chest Pain. Take no more than 3 doses in 15 minutes., Disp: 30 tablet, Rfl: 12    Objective:   Physical Exam   Constitutional: He is oriented to person, place, and time. He appears well-developed and well-nourished. No distress.   HENT:   Head: Normocephalic and atraumatic.   Mouth/Throat: Oropharynx is clear and moist.   Eyes: Pupils are equal, round, and reactive to light.  "Conjunctivae and EOM are normal. No scleral icterus.   Neck: Normal range of motion. Neck supple. No JVD present. No tracheal deviation present. No thyromegaly present.   Cardiovascular: Normal rate, regular rhythm, S1 normal, S2 normal, normal heart sounds, intact distal pulses and normal pulses. PMI is not displaced. Exam reveals no gallop and no friction rub.   No murmur heard.  Pulmonary/Chest: Effort normal and breath sounds normal. No stridor. No respiratory distress. He has no wheezes. He has no rales.   Abdominal: Soft. Bowel sounds are normal. He exhibits no distension and no mass. There is no tenderness. There is no rebound and no guarding.   Musculoskeletal: Normal range of motion. He exhibits no edema or deformity.   Neurological: He is alert and oriented to person, place, and time. He displays normal reflexes. No cranial nerve deficit. Coordination normal.   Skin: Skin is warm and dry. No rash noted. He is not diaphoretic. No erythema.   Psychiatric: He has a normal mood and affect. His behavior is normal. Thought content normal.     Blood pressure 126/60, pulse 61, height 167.6 cm (65.98\"), weight 60.8 kg (134 lb), SpO2 98 %.   Lab Review:     Assessment:       1. Coronary artery disease involving native coronary artery of native heart without angina pectoris  Stable and angina free with an active lifestyle.  The patient had no cardiovascular issues with his recent gallbladder removal surgery.  His routine activity commonly exceeds 8 metabolic equivalents without symptoms.    2. Essential hypertension  Acceptable blood pressure control.    3. Sick sinus syndrome (CMS/HCC)  Normally functioning pacemaker.    4. 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    5. Pre-operative cardiovascular examination  From my standpoint the patient is cleared at low risk of a cardiovascular complication from his planned abdominal surgery.    Procedures    Plan: "     No additional cardiovascular testing is warranted.  The patient may interrupt his aspirin therapy if deemed necessary.  No changes to his medication profile is indicated at this time.  The patient may proceed with planned abdominal surgery without further delay.

## 2020-02-08 ENCOUNTER — READMISSION MANAGEMENT (OUTPATIENT)
Dept: CALL CENTER | Facility: HOSPITAL | Age: 85
End: 2020-02-08

## 2020-02-08 NOTE — OUTREACH NOTE
General Surgery Week 4 Survey      Responses   Facility patient discharged from?  [facility]   Does the patient have one of the following disease processes/diagnoses(primary or secondary)?  General Surgery   Week 4 attempt successful?  Yes   Call start time  1814   Call end time  1816   Discharge diagnosis  Sepsis secondary to acute cholecystitis, present on admission, status post laparoscopic cholecystectomy on 1/12/2020, treated   Person spoke with today (if not patient) and relationship  Marisol-spouse   Is the patient taking all medications as directed (includes completed medication regime)?  Yes   Has the patient kept scheduled appointments due by today?  Yes   Is the patient still receiving Home Health Services?  N/A   What is the patient's perception of their health status since discharge?  Improving   Nursing interventions  Nurse provided patient education   Is the patient/caregiver able to teach back steps to recovery at home?  Eat a well-balance diet   Week 4 call completed?  Yes   Would the patient like one additional call?  No   Graduated  Yes   Did the patient feel the follow up calls were helpful during their recovery period?  Yes   Was the number of calls appropriate?  Yes          Winnie Crowder RN

## 2020-08-03 ENCOUNTER — OFFICE VISIT (OUTPATIENT)
Dept: CARDIOLOGY | Facility: CLINIC | Age: 85
End: 2020-08-03

## 2020-08-03 VITALS
HEART RATE: 60 BPM | SYSTOLIC BLOOD PRESSURE: 132 MMHG | DIASTOLIC BLOOD PRESSURE: 52 MMHG | OXYGEN SATURATION: 98 % | HEIGHT: 66 IN | WEIGHT: 133 LBS | BODY MASS INDEX: 21.38 KG/M2

## 2020-08-03 DIAGNOSIS — Z95.0 CARDIAC PACEMAKER IN SITU: ICD-10-CM

## 2020-08-03 DIAGNOSIS — I49.5 SICK SINUS SYNDROME (HCC): ICD-10-CM

## 2020-08-03 DIAGNOSIS — I10 ESSENTIAL HYPERTENSION: Primary | ICD-10-CM

## 2020-08-03 DIAGNOSIS — R06.09 DOE (DYSPNEA ON EXERTION): ICD-10-CM

## 2020-08-03 PROCEDURE — 93281 PM DEVICE PROGR EVAL MULTI: CPT | Performed by: INTERNAL MEDICINE

## 2020-08-03 PROCEDURE — 99214 OFFICE O/P EST MOD 30 MIN: CPT | Performed by: INTERNAL MEDICINE

## 2020-08-03 NOTE — PROGRESS NOTES
"    Subjective:     Encounter Date:08/03/2020      Patient ID: Ba Singh is a 84 y.o. male.    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  The patient underwent an emergency cholecystectomy at our facility earlier this year.  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  He then underwent partial liver resection at the [facility] in [facility].  The patient was told by his surgical oncologist that \"all the cancer was removed\".  The patient indicates that he underwent a vasodilator nuclear stress test for preoperative purposes prior to liver resection.  The patient was told that there were no abnormalities and no evidence of blockages in his heart arteries or prior heart attack.  The patient has had no chest discomfort at rest or with activity.  He has noticed worsening shortness of breath since his liver resection.  He has had no orthopnea PND or lower extremity edema.  His shortness of breath is primarily with activity and relieved with rest over 10-15 minutes.  He has had no dizziness palpitations or syncope.  The patient underwent routine pacemaker interrogation at today's visit as [facility] indicated that \"his pacemaker battery should be checked\".  The patient has a normally functioning [employer] dual-chamber rate responsive pacemaker at 60/110.  This is an [employer] model 2210.  The date of implant is 3/24/2012.  The patient is paced 92% of the time in the right atrium and 2.3% of the time in the right ventricle.  Right atrial lead interrogation demonstrates P wave sensing at 0.4 mV.  The pacing threshold is 0.  7 5 V at 0.5 ms with a lead impedance of 360 ohms.  Right ventricular lead interrogation demonstrates R wave sensing of 10.8 mV with a pacing threshold of 0.75 V at 0.5 ms.  Lead impedance is 480 " ohms.  The patient is noted to have increasing lead noise since liver surgery.  He has had less than 1% mode switches all of which appear to be lead noise.  Atrial sensitivity was decreased to 0.1 mV.  He reports compliance with his medications with no perceived side effects.  The following portions of the patient's history were reviewed and updated as appropriate: allergies, current medications, past family history, past medical history, past social history, past surgical history and problem  Review of Systems   Constitution: Negative for chills, diaphoresis, fever, malaise/fatigue, weight gain and weight loss.   HENT: Negative for ear discharge, hearing loss, hoarse voice and nosebleeds.    Eyes: Negative for discharge, double vision, pain and photophobia.   Cardiovascular: Positive for dyspnea on exertion. Negative for chest pain, claudication, cyanosis, irregular heartbeat, leg swelling, near-syncope, orthopnea, palpitations, paroxysmal nocturnal dyspnea and syncope.   Respiratory: Negative for cough, hemoptysis, shortness of breath, sputum production and wheezing.    Endocrine: Negative for cold intolerance, heat intolerance, polydipsia, polyphagia and polyuria.   Hematologic/Lymphatic: Negative for adenopathy and bleeding problem. Does not bruise/bleed easily.   Skin: Negative for color change, flushing, itching and rash.   Musculoskeletal: Negative for muscle cramps, muscle weakness, myalgias and stiffness.   Gastrointestinal: Negative for abdominal pain, diarrhea, hematemesis, hematochezia, nausea and vomiting.   Genitourinary: Negative for dysuria, frequency and nocturia.   Neurological: Negative for focal weakness, loss of balance, numbness, paresthesias and seizures.   Psychiatric/Behavioral: Negative for altered mental status, hallucinations and suicidal ideas.   Allergic/Immunologic: Negative for HIV exposure, hives and persistent infections.           Current Outpatient Medications:   •  amLODIPine  (NORVASC) 5 MG tablet, Take 1 tablet by mouth Daily. (Patient taking differently: Take 2.5 mg by mouth Daily.), Disp: 30 tablet, Rfl: 0  •  Ascorbic Acid (VITAMIN C PO), Take  by mouth., Disp: , Rfl:   •  aspirin 81 MG chewable tablet, Chew 1 tablet Daily., Disp: 30 tablet, Rfl: 0  •  carvedilol (COREG) 25 MG tablet, Take 1 tablet by mouth 2 (Two) Times a Day. (Patient taking differently: Take 12.5 mg by mouth Daily. Per patient, he takes half a tablet once daily.), Disp: 60 tablet, Rfl: 0  •  [employer] CALCIUM PO, Take  by mouth., Disp: , Rfl:   •  Multiple Vitamins-Minerals (MULTIVITAMIN WITH MINERALS) tablet tablet, Take 1 tablet by mouth Daily., Disp: , Rfl:   •  nitroglycerin (NITROSTAT) 0.4 MG SL tablet, Place 1 tablet under the tongue Every 5 (Five) Minutes As Needed for Chest Pain. Take no more than 3 doses in 15 minutes., Disp: 30 tablet, Rfl: 12  •  Zinc 50 MG capsule, Take  by mouth Daily., Disp: , Rfl:     Objective:   Physical Exam   Constitutional: He is oriented to person, place, and time. He appears well-developed and well-nourished. No distress.   HENT:   Head: Normocephalic and atraumatic.   Mouth/Throat: Oropharynx is clear and moist.   Eyes: Pupils are equal, round, and reactive to light. Conjunctivae and EOM are normal. No scleral icterus.   Neck: Normal range of motion. Neck supple. No JVD present. No tracheal deviation present. No thyromegaly present.   Cardiovascular: Normal rate, regular rhythm, S1 normal, S2 normal, normal heart sounds, intact distal pulses and normal pulses. PMI is not displaced. Exam reveals no gallop and no friction rub.   No murmur heard.  Pulmonary/Chest: Effort normal and breath sounds normal. No stridor. No respiratory distress. He has no wheezes. He has no rales.   Abdominal: Soft. Bowel sounds are normal. He exhibits no distension and no mass. There is no tenderness. There is no rebound and no guarding.   Musculoskeletal: Normal range of motion. He exhibits no edema or  "deformity.   Neurological: He is alert and oriented to person, place, and time. He displays normal reflexes. No cranial nerve deficit. Coordination normal.   Skin: Skin is warm and dry. No rash noted. He is not diaphoretic. No erythema.   Psychiatric: He has a normal mood and affect. His behavior is normal. Thought content normal.     Blood pressure 132/52, pulse 60, height 167.6 cm (65.98\"), weight 60.3 kg (133 lb), SpO2 98 %.   Lab Review:     Assessment:       1. Essential hypertension  Acceptable blood pressure control.    2. Sick sinus syndrome (CMS/HCC)  Normal dual-chamber rate responsive pacemaker function.    3. GARCIA (dyspnea on exertion)  No evidence of ischemic heart disease.  We have requested records of the patient's vasodilator nuclear stress test recently performed at the [facility].    Procedures    Plan:     Advance Care Planning   ACP discussion was held with the patient during this visit. Patient has an advance directive (not in EMR), copy requested.  We have requested a copy of the patient's recent vasodilator nuclear stress test from the [facility].  The patient was informed that there were no abnormalities prior to liver surgery.  I have recommended an echocardiogram to evaluate for possible diastolic dysfunction, pericardial disease and/or pulmonary hypertension.  No changes to his medication therapy have been made at today's visit.  Further recommendations will be predicated on the results of his outpatient testing.      "

## 2022-07-08 ENCOUNTER — HOSPITAL ENCOUNTER (EMERGENCY)
Facility: HOSPITAL | Age: 87
Discharge: HOME | End: 2022-07-08
Attending: EMERGENCY MEDICINE | Admitting: EMERGENCY MEDICINE

## 2022-07-08 ENCOUNTER — APPOINTMENT (OUTPATIENT)
Dept: CT IMAGING | Facility: HOSPITAL | Age: 87
End: 2022-07-08

## 2022-07-08 VITALS
HEIGHT: 66 IN | OXYGEN SATURATION: 95 % | DIASTOLIC BLOOD PRESSURE: 75 MMHG | BODY MASS INDEX: 22.5 KG/M2 | TEMPERATURE: 97.7 F | SYSTOLIC BLOOD PRESSURE: 183 MMHG | RESPIRATION RATE: 16 BRPM | WEIGHT: 140 LBS | HEART RATE: 60 BPM

## 2022-07-08 DIAGNOSIS — R10.13 EPIGASTRIC PAIN: Primary | ICD-10-CM

## 2022-07-08 DIAGNOSIS — Q45.3 PANCREATIC ABNORMALITY: ICD-10-CM

## 2022-07-08 LAB
ALBUMIN SERPL-MCNC: 3.9 G/DL (ref 3.5–5.2)
ALBUMIN/GLOB SERPL: 1.3 G/DL
ALP SERPL-CCNC: 145 U/L (ref 39–117)
ALT SERPL W P-5'-P-CCNC: 28 U/L (ref 1–41)
ANION GAP SERPL CALCULATED.3IONS-SCNC: 12.3 MMOL/L (ref 5–15)
AST SERPL-CCNC: 27 U/L (ref 1–40)
BASOPHILS # BLD AUTO: 0.02 10*3/MM3 (ref 0–0.2)
BASOPHILS NFR BLD AUTO: 0.2 % (ref 0–1.5)
BILIRUB SERPL-MCNC: 0.2 MG/DL (ref 0–1.2)
BILIRUB UR QL STRIP: NEGATIVE
BUN SERPL-MCNC: 20 MG/DL (ref 8–23)
BUN/CREAT SERPL: 12.1 (ref 7–25)
CALCIUM SPEC-SCNC: 8.7 MG/DL (ref 8.6–10.5)
CHLORIDE SERPL-SCNC: 105 MMOL/L (ref 98–107)
CLARITY UR: CLEAR
CO2 SERPL-SCNC: 21.7 MMOL/L (ref 22–29)
COLOR UR: YELLOW
CREAT SERPL-MCNC: 1.65 MG/DL (ref 0.76–1.27)
D-LACTATE SERPL-SCNC: 1 MMOL/L (ref 0.5–2)
DEPRECATED RDW RBC AUTO: 39.3 FL (ref 37–54)
EGFRCR SERPLBLD CKD-EPI 2021: 40.7 ML/MIN/1.73
EOSINOPHIL # BLD AUTO: 0.42 10*3/MM3 (ref 0–0.4)
EOSINOPHIL NFR BLD AUTO: 4.9 % (ref 0.3–6.2)
ERYTHROCYTE [DISTWIDTH] IN BLOOD BY AUTOMATED COUNT: 11.7 % (ref 12.3–15.4)
GLOBULIN UR ELPH-MCNC: 3 GM/DL
GLUCOSE SERPL-MCNC: 140 MG/DL (ref 65–99)
GLUCOSE UR STRIP-MCNC: NEGATIVE MG/DL
HCT VFR BLD AUTO: 36.9 % (ref 37.5–51)
HGB BLD-MCNC: 13 G/DL (ref 13–17.7)
HGB UR QL STRIP.AUTO: NEGATIVE
IMM GRANULOCYTES # BLD AUTO: 0.03 10*3/MM3 (ref 0–0.05)
IMM GRANULOCYTES NFR BLD AUTO: 0.4 % (ref 0–0.5)
KETONES UR QL STRIP: NEGATIVE
LEUKOCYTE ESTERASE UR QL STRIP.AUTO: NEGATIVE
LIPASE SERPL-CCNC: 21 U/L (ref 13–60)
LYMPHOCYTES # BLD AUTO: 2.02 10*3/MM3 (ref 0.7–3.1)
LYMPHOCYTES NFR BLD AUTO: 23.8 % (ref 19.6–45.3)
MCH RBC QN AUTO: 32.4 PG (ref 26.6–33)
MCHC RBC AUTO-ENTMCNC: 35.2 G/DL (ref 31.5–35.7)
MCV RBC AUTO: 92 FL (ref 79–97)
MONOCYTES # BLD AUTO: 0.92 10*3/MM3 (ref 0.1–0.9)
MONOCYTES NFR BLD AUTO: 10.8 % (ref 5–12)
NEUTROPHILS NFR BLD AUTO: 5.08 10*3/MM3 (ref 1.7–7)
NEUTROPHILS NFR BLD AUTO: 59.9 % (ref 42.7–76)
NITRITE UR QL STRIP: NEGATIVE
NRBC BLD AUTO-RTO: 0 /100 WBC (ref 0–0.2)
PH UR STRIP.AUTO: 5.5 [PH] (ref 5–8)
PLATELET # BLD AUTO: 245 10*3/MM3 (ref 140–450)
PMV BLD AUTO: 11.2 FL (ref 6–12)
POTASSIUM SERPL-SCNC: 4.5 MMOL/L (ref 3.5–5.2)
PROT SERPL-MCNC: 6.9 G/DL (ref 6–8.5)
PROT UR QL STRIP: NEGATIVE
RBC # BLD AUTO: 4.01 10*6/MM3 (ref 4.14–5.8)
SODIUM SERPL-SCNC: 139 MMOL/L (ref 136–145)
SP GR UR STRIP: 1.01 (ref 1–1.03)
TROPONIN T SERPL-MCNC: <0.01 NG/ML (ref 0–0.03)
UROBILINOGEN UR QL STRIP: NORMAL
WBC NRBC COR # BLD: 8.49 10*3/MM3 (ref 3.4–10.8)

## 2022-07-08 PROCEDURE — 25010000002 ONDANSETRON PER 1 MG: Performed by: EMERGENCY MEDICINE

## 2022-07-08 PROCEDURE — 85025 COMPLETE CBC W/AUTO DIFF WBC: CPT | Performed by: EMERGENCY MEDICINE

## 2022-07-08 PROCEDURE — 84484 ASSAY OF TROPONIN QUANT: CPT | Performed by: EMERGENCY MEDICINE

## 2022-07-08 PROCEDURE — 96374 THER/PROPH/DIAG INJ IV PUSH: CPT

## 2022-07-08 PROCEDURE — 25010000002 IOPAMIDOL 61 % SOLUTION: Performed by: EMERGENCY MEDICINE

## 2022-07-08 PROCEDURE — 80053 COMPREHEN METABOLIC PANEL: CPT | Performed by: EMERGENCY MEDICINE

## 2022-07-08 PROCEDURE — 96375 TX/PRO/DX INJ NEW DRUG ADDON: CPT

## 2022-07-08 PROCEDURE — 93005 ELECTROCARDIOGRAM TRACING: CPT

## 2022-07-08 PROCEDURE — 74177 CT ABD & PELVIS W/CONTRAST: CPT

## 2022-07-08 PROCEDURE — 99283 EMERGENCY DEPT VISIT LOW MDM: CPT

## 2022-07-08 PROCEDURE — 83605 ASSAY OF LACTIC ACID: CPT | Performed by: EMERGENCY MEDICINE

## 2022-07-08 PROCEDURE — 86301 IMMUNOASSAY TUMOR CA 19-9: CPT | Performed by: EMERGENCY MEDICINE

## 2022-07-08 PROCEDURE — 25010000002 MORPHINE PER 10 MG: Performed by: EMERGENCY MEDICINE

## 2022-07-08 PROCEDURE — 83690 ASSAY OF LIPASE: CPT | Performed by: EMERGENCY MEDICINE

## 2022-07-08 PROCEDURE — 81003 URINALYSIS AUTO W/O SCOPE: CPT | Performed by: EMERGENCY MEDICINE

## 2022-07-08 PROCEDURE — 25010000002 FENTANYL CITRATE (PF) 50 MCG/ML SOLUTION: Performed by: EMERGENCY MEDICINE

## 2022-07-08 RX ORDER — LIDOCAINE HYDROCHLORIDE 20 MG/ML
15 SOLUTION OROPHARYNGEAL ONCE
Status: COMPLETED | OUTPATIENT
Start: 2022-07-08 | End: 2022-07-08

## 2022-07-08 RX ORDER — FENTANYL CITRATE 50 UG/ML
50 INJECTION, SOLUTION INTRAMUSCULAR; INTRAVENOUS ONCE
Status: COMPLETED | OUTPATIENT
Start: 2022-07-08 | End: 2022-07-08

## 2022-07-08 RX ORDER — MORPHINE SULFATE 4 MG/ML
4 INJECTION, SOLUTION INTRAMUSCULAR; INTRAVENOUS ONCE
Status: COMPLETED | OUTPATIENT
Start: 2022-07-08 | End: 2022-07-08

## 2022-07-08 RX ORDER — OXYCODONE HYDROCHLORIDE AND ACETAMINOPHEN 5; 325 MG/1; MG/1
2 TABLET ORAL ONCE
Status: COMPLETED | OUTPATIENT
Start: 2022-07-08 | End: 2022-07-08

## 2022-07-08 RX ORDER — OXYCODONE HYDROCHLORIDE AND ACETAMINOPHEN 5; 325 MG/1; MG/1
1 TABLET ORAL EVERY 6 HOURS PRN
Qty: 16 TABLET | Refills: 0 | Status: SHIPPED | OUTPATIENT
Start: 2022-07-08

## 2022-07-08 RX ORDER — ONDANSETRON 2 MG/ML
4 INJECTION INTRAMUSCULAR; INTRAVENOUS ONCE
Status: COMPLETED | OUTPATIENT
Start: 2022-07-08 | End: 2022-07-08

## 2022-07-08 RX ORDER — ALUMINA, MAGNESIA, AND SIMETHICONE 2400; 2400; 240 MG/30ML; MG/30ML; MG/30ML
15 SUSPENSION ORAL ONCE
Status: COMPLETED | OUTPATIENT
Start: 2022-07-08 | End: 2022-07-08

## 2022-07-08 RX ORDER — PANTOPRAZOLE SODIUM 40 MG/1
40 TABLET, DELAYED RELEASE ORAL DAILY
Qty: 30 TABLET | Refills: 0 | Status: SHIPPED | OUTPATIENT
Start: 2022-07-08

## 2022-07-08 RX ORDER — PANTOPRAZOLE SODIUM 40 MG/10ML
80 INJECTION, POWDER, LYOPHILIZED, FOR SOLUTION INTRAVENOUS ONCE
Status: COMPLETED | OUTPATIENT
Start: 2022-07-08 | End: 2022-07-08

## 2022-07-08 RX ORDER — ONDANSETRON 4 MG/1
4 TABLET, ORALLY DISINTEGRATING ORAL EVERY 6 HOURS PRN
Qty: 10 TABLET | Refills: 0 | Status: SHIPPED | OUTPATIENT
Start: 2022-07-08

## 2022-07-08 RX ADMIN — ALUMINUM HYDROXIDE, MAGNESIUM HYDROXIDE, AND DIMETHICONE 15 ML: 400; 400; 40 SUSPENSION ORAL at 15:07

## 2022-07-08 RX ADMIN — OXYCODONE HYDROCHLORIDE AND ACETAMINOPHEN 2 TABLET: 5; 325 TABLET ORAL at 16:40

## 2022-07-08 RX ADMIN — PANTOPRAZOLE SODIUM 80 MG: 40 INJECTION, POWDER, FOR SOLUTION INTRAVENOUS at 13:01

## 2022-07-08 RX ADMIN — MORPHINE SULFATE 4 MG: 4 INJECTION, SOLUTION INTRAMUSCULAR; INTRAVENOUS at 15:10

## 2022-07-08 RX ADMIN — LIDOCAINE HYDROCHLORIDE 15 ML: 20 SOLUTION ORAL; TOPICAL at 15:07

## 2022-07-08 RX ADMIN — ONDANSETRON 4 MG: 2 INJECTION INTRAMUSCULAR; INTRAVENOUS at 13:02

## 2022-07-08 RX ADMIN — SODIUM CHLORIDE 1000 ML: 9 INJECTION, SOLUTION INTRAVENOUS at 13:01

## 2022-07-08 RX ADMIN — IOPAMIDOL 100 ML: 612 INJECTION, SOLUTION INTRAVENOUS at 13:52

## 2022-07-08 RX ADMIN — FENTANYL CITRATE 50 MCG: 50 INJECTION INTRAMUSCULAR; INTRAVENOUS at 13:26

## 2022-07-09 LAB — CANCER AG19-9 SERPL-ACNC: 579 U/ML

## 2022-12-13 ENCOUNTER — APPOINTMENT (OUTPATIENT)
Dept: GENERAL RADIOLOGY | Facility: HOSPITAL | Age: 87
End: 2022-12-13

## 2022-12-13 ENCOUNTER — HOSPITAL ENCOUNTER (OUTPATIENT)
Facility: HOSPITAL | Age: 87
Setting detail: OBSERVATION
Discharge: HOME | End: 2022-12-14
Attending: EMERGENCY MEDICINE | Admitting: INTERNAL MEDICINE

## 2022-12-13 DIAGNOSIS — I20.0 UNSTABLE ANGINA: Primary | ICD-10-CM

## 2022-12-13 DIAGNOSIS — R73.9 HYPERGLYCEMIA: ICD-10-CM

## 2022-12-13 DIAGNOSIS — R07.9 CHEST PAIN, UNSPECIFIED TYPE: ICD-10-CM

## 2022-12-13 PROBLEM — N18.30 CHRONIC KIDNEY DISEASE, STAGE III (MODERATE): Status: ACTIVE | Noted: 2022-12-13

## 2022-12-13 LAB
ALBUMIN SERPL-MCNC: 3.8 G/DL (ref 3.5–5.2)
ALBUMIN/GLOB SERPL: 1.4 G/DL
ALP SERPL-CCNC: 139 U/L (ref 39–117)
ALT SERPL W P-5'-P-CCNC: 28 U/L (ref 1–41)
ANION GAP SERPL CALCULATED.3IONS-SCNC: 12.9 MMOL/L (ref 5–15)
APTT PPP: 31.5 SECONDS (ref 70–100)
AST SERPL-CCNC: 26 U/L (ref 1–40)
BASOPHILS # BLD AUTO: 0.01 10*3/MM3 (ref 0–0.2)
BASOPHILS NFR BLD AUTO: 0.1 % (ref 0–1.5)
BILIRUB SERPL-MCNC: 0.3 MG/DL (ref 0–1.2)
BUN SERPL-MCNC: 35 MG/DL (ref 8–23)
BUN/CREAT SERPL: 17.9 (ref 7–25)
CALCIUM SPEC-SCNC: 8.4 MG/DL (ref 8.6–10.5)
CHLORIDE SERPL-SCNC: 99 MMOL/L (ref 98–107)
CO2 SERPL-SCNC: 18.1 MMOL/L (ref 22–29)
CREAT SERPL-MCNC: 1.95 MG/DL (ref 0.76–1.27)
DEPRECATED RDW RBC AUTO: 41.4 FL (ref 37–54)
EGFRCR SERPLBLD CKD-EPI 2021: 33.1 ML/MIN/1.73
EOSINOPHIL # BLD AUTO: 0 10*3/MM3 (ref 0–0.4)
EOSINOPHIL NFR BLD AUTO: 0 % (ref 0.3–6.2)
ERYTHROCYTE [DISTWIDTH] IN BLOOD BY AUTOMATED COUNT: 12.9 % (ref 12.3–15.4)
GLOBULIN UR ELPH-MCNC: 2.8 GM/DL
GLUCOSE BLDC GLUCOMTR-MCNC: 287 MG/DL (ref 70–130)
GLUCOSE BLDC GLUCOMTR-MCNC: 343 MG/DL (ref 70–130)
GLUCOSE BLDC GLUCOMTR-MCNC: 496 MG/DL (ref 70–130)
GLUCOSE SERPL-MCNC: 531 MG/DL (ref 65–99)
HCT VFR BLD AUTO: 29.4 % (ref 37.5–51)
HGB BLD-MCNC: 9.9 G/DL (ref 13–17.7)
HOLD SPECIMEN: NORMAL
HOLD SPECIMEN: NORMAL
IMM GRANULOCYTES # BLD AUTO: 0.03 10*3/MM3 (ref 0–0.05)
IMM GRANULOCYTES NFR BLD AUTO: 0.4 % (ref 0–0.5)
INR PPP: 1.06 (ref 0.9–1.1)
LYMPHOCYTES # BLD AUTO: 0.26 10*3/MM3 (ref 0.7–3.1)
LYMPHOCYTES NFR BLD AUTO: 3.1 % (ref 19.6–45.3)
MCH RBC QN AUTO: 31.3 PG (ref 26.6–33)
MCHC RBC AUTO-ENTMCNC: 33.7 G/DL (ref 31.5–35.7)
MCV RBC AUTO: 93 FL (ref 79–97)
MONOCYTES # BLD AUTO: 0.43 10*3/MM3 (ref 0.1–0.9)
MONOCYTES NFR BLD AUTO: 5.2 % (ref 5–12)
NEUTROPHILS NFR BLD AUTO: 7.55 10*3/MM3 (ref 1.7–7)
NEUTROPHILS NFR BLD AUTO: 91.2 % (ref 42.7–76)
NRBC BLD AUTO-RTO: 0 /100 WBC (ref 0–0.2)
PLATELET # BLD AUTO: 410 10*3/MM3 (ref 140–450)
PMV BLD AUTO: 10.7 FL (ref 6–12)
POTASSIUM SERPL-SCNC: 5 MMOL/L (ref 3.5–5.2)
PROT SERPL-MCNC: 6.6 G/DL (ref 6–8.5)
PROTHROMBIN TIME: 14.1 SECONDS (ref 12.5–14.5)
RBC # BLD AUTO: 3.16 10*6/MM3 (ref 4.14–5.8)
SODIUM SERPL-SCNC: 130 MMOL/L (ref 136–145)
TROPONIN T SERPL-MCNC: 0.04 NG/ML (ref 0–0.03)
TROPONIN T SERPL-MCNC: 0.04 NG/ML (ref 0–0.03)
UFH PPP CHRO-ACNC: 0.1 IU/ML (ref 0.3–0.7)
UFH PPP CHRO-ACNC: 0.3 IU/ML (ref 0.3–0.7)
WBC NRBC COR # BLD: 8.28 10*3/MM3 (ref 3.4–10.8)
WHOLE BLOOD HOLD COAG: NORMAL
WHOLE BLOOD HOLD SPECIMEN: NORMAL

## 2022-12-13 PROCEDURE — 99220 PR INITIAL OBSERVATION CARE/DAY 70 MINUTES: CPT | Performed by: INTERNAL MEDICINE

## 2022-12-13 PROCEDURE — 85025 COMPLETE CBC W/AUTO DIFF WBC: CPT

## 2022-12-13 PROCEDURE — 99285 EMERGENCY DEPT VISIT HI MDM: CPT

## 2022-12-13 PROCEDURE — 82962 GLUCOSE BLOOD TEST: CPT

## 2022-12-13 PROCEDURE — 85520 HEPARIN ASSAY: CPT | Performed by: EMERGENCY MEDICINE

## 2022-12-13 PROCEDURE — 63710000001 INSULIN ASPART PER 5 UNITS: Performed by: INTERNAL MEDICINE

## 2022-12-13 PROCEDURE — 96366 THER/PROPH/DIAG IV INF ADDON: CPT

## 2022-12-13 PROCEDURE — 96376 TX/PRO/DX INJ SAME DRUG ADON: CPT

## 2022-12-13 PROCEDURE — 63710000001 INSULIN REGULAR HUMAN PER 5 UNITS: Performed by: EMERGENCY MEDICINE

## 2022-12-13 PROCEDURE — 63710000001 INSULIN DETEMIR PER 5 UNITS: Performed by: INTERNAL MEDICINE

## 2022-12-13 PROCEDURE — 36415 COLL VENOUS BLD VENIPUNCTURE: CPT

## 2022-12-13 PROCEDURE — 93005 ELECTROCARDIOGRAM TRACING: CPT

## 2022-12-13 PROCEDURE — 71045 X-RAY EXAM CHEST 1 VIEW: CPT

## 2022-12-13 PROCEDURE — 85730 THROMBOPLASTIN TIME PARTIAL: CPT | Performed by: EMERGENCY MEDICINE

## 2022-12-13 PROCEDURE — 84484 ASSAY OF TROPONIN QUANT: CPT | Performed by: EMERGENCY MEDICINE

## 2022-12-13 PROCEDURE — G0378 HOSPITAL OBSERVATION PER HR: HCPCS

## 2022-12-13 PROCEDURE — 84484 ASSAY OF TROPONIN QUANT: CPT

## 2022-12-13 PROCEDURE — 80053 COMPREHEN METABOLIC PANEL: CPT

## 2022-12-13 PROCEDURE — 96365 THER/PROPH/DIAG IV INF INIT: CPT

## 2022-12-13 PROCEDURE — 25010000002 HEPARIN (PORCINE) PER 1000 UNITS: Performed by: EMERGENCY MEDICINE

## 2022-12-13 PROCEDURE — 85610 PROTHROMBIN TIME: CPT | Performed by: EMERGENCY MEDICINE

## 2022-12-13 RX ORDER — CARVEDILOL 6.25 MG/1
12.5 TABLET ORAL DAILY
Status: DISCONTINUED | OUTPATIENT
Start: 2022-12-13 | End: 2022-12-14 | Stop reason: HOSPADM

## 2022-12-13 RX ORDER — INSULIN ASPART 100 [IU]/ML
0-9 INJECTION, SOLUTION INTRAVENOUS; SUBCUTANEOUS
Status: DISCONTINUED | OUTPATIENT
Start: 2022-12-13 | End: 2022-12-14 | Stop reason: HOSPADM

## 2022-12-13 RX ORDER — OXYCODONE HYDROCHLORIDE AND ACETAMINOPHEN 5; 325 MG/1; MG/1
1 TABLET ORAL EVERY 6 HOURS PRN
Status: DISCONTINUED | OUTPATIENT
Start: 2022-12-13 | End: 2022-12-14 | Stop reason: HOSPADM

## 2022-12-13 RX ORDER — BISACODYL 10 MG
10 SUPPOSITORY, RECTAL RECTAL DAILY PRN
Status: DISCONTINUED | OUTPATIENT
Start: 2022-12-13 | End: 2022-12-14 | Stop reason: HOSPADM

## 2022-12-13 RX ORDER — HEPARIN SODIUM 10000 [USP'U]/100ML
14 INJECTION, SOLUTION INTRAVENOUS
Status: DISCONTINUED | OUTPATIENT
Start: 2022-12-13 | End: 2022-12-14

## 2022-12-13 RX ORDER — SODIUM CHLORIDE 0.9 % (FLUSH) 0.9 %
3 SYRINGE (ML) INJECTION EVERY 12 HOURS SCHEDULED
Status: DISCONTINUED | OUTPATIENT
Start: 2022-12-13 | End: 2022-12-14 | Stop reason: HOSPADM

## 2022-12-13 RX ORDER — BISACODYL 5 MG/1
5 TABLET, DELAYED RELEASE ORAL DAILY PRN
Status: DISCONTINUED | OUTPATIENT
Start: 2022-12-13 | End: 2022-12-14 | Stop reason: HOSPADM

## 2022-12-13 RX ORDER — SODIUM CHLORIDE 0.9 % (FLUSH) 0.9 %
10 SYRINGE (ML) INJECTION AS NEEDED
Status: DISCONTINUED | OUTPATIENT
Start: 2022-12-13 | End: 2022-12-14 | Stop reason: HOSPADM

## 2022-12-13 RX ORDER — SODIUM CHLORIDE 9 MG/ML
40 INJECTION, SOLUTION INTRAVENOUS AS NEEDED
Status: DISCONTINUED | OUTPATIENT
Start: 2022-12-13 | End: 2022-12-14 | Stop reason: HOSPADM

## 2022-12-13 RX ORDER — MULTIPLE VITAMINS W/ MINERALS TAB 9MG-400MCG
1 TAB ORAL DAILY
Status: DISCONTINUED | OUTPATIENT
Start: 2022-12-14 | End: 2022-12-14 | Stop reason: HOSPADM

## 2022-12-13 RX ORDER — PANTOPRAZOLE SODIUM 40 MG/1
40 TABLET, DELAYED RELEASE ORAL DAILY
Status: DISCONTINUED | OUTPATIENT
Start: 2022-12-14 | End: 2022-12-14 | Stop reason: HOSPADM

## 2022-12-13 RX ORDER — ASPIRIN 325 MG
325 TABLET ORAL ONCE
Status: DISCONTINUED | OUTPATIENT
Start: 2022-12-13 | End: 2022-12-13

## 2022-12-13 RX ORDER — POLYETHYLENE GLYCOL 3350 17 G/17G
17 POWDER, FOR SOLUTION ORAL DAILY PRN
Status: DISCONTINUED | OUTPATIENT
Start: 2022-12-13 | End: 2022-12-14 | Stop reason: HOSPADM

## 2022-12-13 RX ORDER — ACETAMINOPHEN 160 MG/5ML
650 SOLUTION ORAL EVERY 4 HOURS PRN
Status: DISCONTINUED | OUTPATIENT
Start: 2022-12-13 | End: 2022-12-14 | Stop reason: HOSPADM

## 2022-12-13 RX ORDER — AMLODIPINE BESYLATE 5 MG/1
2.5 TABLET ORAL DAILY
Status: DISCONTINUED | OUTPATIENT
Start: 2022-12-14 | End: 2022-12-14 | Stop reason: HOSPADM

## 2022-12-13 RX ORDER — ACETAMINOPHEN 325 MG/1
650 TABLET ORAL EVERY 4 HOURS PRN
Status: DISCONTINUED | OUTPATIENT
Start: 2022-12-13 | End: 2022-12-14 | Stop reason: HOSPADM

## 2022-12-13 RX ORDER — NICOTINE POLACRILEX 4 MG
15 LOZENGE BUCCAL
Status: DISCONTINUED | OUTPATIENT
Start: 2022-12-13 | End: 2022-12-14 | Stop reason: HOSPADM

## 2022-12-13 RX ORDER — ACETAMINOPHEN 650 MG/1
650 SUPPOSITORY RECTAL EVERY 4 HOURS PRN
Status: DISCONTINUED | OUTPATIENT
Start: 2022-12-13 | End: 2022-12-14 | Stop reason: HOSPADM

## 2022-12-13 RX ORDER — HEPARIN SODIUM 1000 [USP'U]/ML
50 INJECTION, SOLUTION INTRAVENOUS; SUBCUTANEOUS AS NEEDED
Status: DISCONTINUED | OUTPATIENT
Start: 2022-12-13 | End: 2022-12-14

## 2022-12-13 RX ORDER — ASPIRIN 81 MG/1
81 TABLET, CHEWABLE ORAL DAILY
Status: DISCONTINUED | OUTPATIENT
Start: 2022-12-14 | End: 2022-12-14 | Stop reason: HOSPADM

## 2022-12-13 RX ORDER — DEXTROSE MONOHYDRATE 25 G/50ML
25 INJECTION, SOLUTION INTRAVENOUS
Status: DISCONTINUED | OUTPATIENT
Start: 2022-12-13 | End: 2022-12-14 | Stop reason: HOSPADM

## 2022-12-13 RX ORDER — HEPARIN SODIUM 1000 [USP'U]/ML
25 INJECTION, SOLUTION INTRAVENOUS; SUBCUTANEOUS AS NEEDED
Status: DISCONTINUED | OUTPATIENT
Start: 2022-12-13 | End: 2022-12-14

## 2022-12-13 RX ORDER — ASPIRIN 81 MG/1
243 TABLET ORAL ONCE
Status: COMPLETED | OUTPATIENT
Start: 2022-12-13 | End: 2022-12-13

## 2022-12-13 RX ORDER — AMOXICILLIN 250 MG
2 CAPSULE ORAL 2 TIMES DAILY
Status: DISCONTINUED | OUTPATIENT
Start: 2022-12-13 | End: 2022-12-14 | Stop reason: HOSPADM

## 2022-12-13 RX ORDER — ONDANSETRON 2 MG/ML
4 INJECTION INTRAMUSCULAR; INTRAVENOUS EVERY 6 HOURS PRN
Status: DISCONTINUED | OUTPATIENT
Start: 2022-12-13 | End: 2022-12-14 | Stop reason: HOSPADM

## 2022-12-13 RX ORDER — SODIUM CHLORIDE 0.9 % (FLUSH) 0.9 %
3-10 SYRINGE (ML) INJECTION AS NEEDED
Status: DISCONTINUED | OUTPATIENT
Start: 2022-12-13 | End: 2022-12-14 | Stop reason: HOSPADM

## 2022-12-13 RX ORDER — HEPARIN SODIUM 1000 [USP'U]/ML
60 INJECTION, SOLUTION INTRAVENOUS; SUBCUTANEOUS ONCE
Status: COMPLETED | OUTPATIENT
Start: 2022-12-13 | End: 2022-12-13

## 2022-12-13 RX ADMIN — ASPIRIN 243 MG: 81 TABLET, COATED ORAL at 12:00

## 2022-12-13 RX ADMIN — HEPARIN SODIUM 3700 UNITS: 1000 INJECTION INTRAVENOUS; SUBCUTANEOUS at 17:46

## 2022-12-13 RX ADMIN — INSULIN DETEMIR 20 UNITS: 100 INJECTION, SOLUTION SUBCUTANEOUS at 21:07

## 2022-12-13 RX ADMIN — HEPARIN SODIUM 12 UNITS/KG/HR: 10000 INJECTION, SOLUTION INTRAVENOUS at 17:47

## 2022-12-13 RX ADMIN — HUMAN INSULIN 10 UNITS: 100 INJECTION, SOLUTION SUBCUTANEOUS at 16:22

## 2022-12-13 RX ADMIN — INSULIN ASPART 7 UNITS: 100 INJECTION, SOLUTION INTRAVENOUS; SUBCUTANEOUS at 18:40

## 2022-12-13 RX ADMIN — INSULIN DETEMIR 20 UNITS: 100 INJECTION, SOLUTION SUBCUTANEOUS at 18:40

## 2022-12-13 NOTE — H&P
[facility]   HISTORY AND PHYSICAL      Name:  Ba Singh   Age:  87 y.o.  Sex:  male  :  1935  MRN:  [id number]   Visit Number:  [id number]  Admission Date:  2022  Date Of Service:  22  Primary Care Physician:  System, Provider Not In    Chief Complaint:     Chest pain.    History Of Presenting Illness:      Mr. 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  Patient states that his chest pain started yesterday after his chemotherapy treatment.  He states that he goes to chemotherapy every 12 days at [facility] in [place].  After he came home he started having retrosternal chest pain that was constant nonradiating.  He did not have any palpitations or sweating.  No history of any radiation.  Denies any pain on deep inspiration.  Patient was subsequently brought to the emergency room by his wife today.  Patient does follow-up with Dr. Hicks who is his primary cardiologist.    In the emergency room, he was afebrile with initial heart rate of 68 and blood pressure of 145/46.  Pulse oxygen saturation was 98% on room air.  Blood work done in the emergency room was remarkable for a glucose of 531, sodium 130, BUN 35, creatinine 1.95 (baseline) and hemoglobin of 9.9.  LFT, WBC and white count was within normal limits.  His initial troponin was 0.039 and a repeat troponin was 0.035.  EKG did not show any acute ST-T changes.  Chest x-ray was unremarkable.  Patient's treatment plan was discussed with Dr. Luque from cardiology and he recommended admission for overnight observation.  Patient received full dose aspirin, 10 units of insulin in the emergency room and was initiated on heparin drip.    Review Of  Systems:    All systems were reviewed and negative except as mentioned in history of presenting illness, assessment and plan.    Past Medical History: Patient  has a past medical history of Bradycardia, Colon polyp, Diabetes mellitus (HCC), Disease of thyroid gland, History of stomach ulcers, Hypertension, Impaired functional mobility, balance, gait, and endurance, and Myocardial infarction (HCC).    Past Surgical History: Patient  has a past surgical history that includes orthopedic surgery; Pacemaker Implantation; pr rt/lt heart catheters (N/A, 10/22/2017); Cardiac catheterization (N/A, 10/22/2017); Cardiac catheterization (N/A, 10/22/2017); Cardiac catheterization (N/A, 10/22/2017); Rotator cuff repair (Left, 2015); Vagotomy and pyloroplasty (1970); and cholecystectomy with intraoperative cholangiogram (N/A, 1/12/2020).    Social History: Patient  reports that he quit smoking about 48 years ago. He has never used smokeless tobacco. He reports that he does not drink alcohol and does not use drugs.    Family History:  Patient family history includes Arrhythmia in his mother; Heart attack in his mother; Hyperlipidemia in his mother; Hypertension in his mother; Obesity in his mother; Stroke in his mother; Thyroid disease in his mother.    Allergies:      Novocain [procaine]    Home Medications:    Prior to Admission Medications     Prescriptions Last Dose Informant Patient Reported? Taking?    amLODIPine (NORVASC) 5 MG tablet   No No    Take 1 tablet by mouth Daily.    Patient taking differently:  Take 2.5 mg by mouth Daily.    Ascorbic Acid (VITAMIN C PO)   Yes No    Take  by mouth.    aspirin 81 MG chewable tablet   No No    Chew 1 tablet Daily.    carvedilol (COREG) 25 MG tablet   No No    Take 1 tablet by mouth 2 (Two) Times a Day.    Patient taking differently:  Take 12.5 mg by mouth Daily. Per patient, he takes half a tablet once daily.    [employer] CALCIUM PO   Yes No    Take  by mouth.    Multiple Vitamins-Minerals  "(MULTIVITAMIN WITH MINERALS) tablet tablet  Self Yes No    Take 1 tablet by mouth Daily.    nitroglycerin (NITROSTAT) 0.4 MG SL tablet   No No    Place 1 tablet under the tongue Every 5 (Five) Minutes As Needed for Chest Pain. Take no more than 3 doses in 15 minutes.    ondansetron ODT (ZOFRAN-ODT) 4 MG disintegrating tablet   No No    Place 1 tablet under the tongue Every 6 (Six) Hours As Needed for Nausea or Vomiting.    oxyCODONE-acetaminophen (PERCOCET) 5-325 MG per tablet   No No    Take 1 tablet by mouth Every 6 (Six) Hours As Needed for Severe Pain .    pantoprazole (PROTONIX) 40 MG EC tablet   No No    Take 1 tablet by mouth Daily.    Zinc 50 MG capsule   Yes No    Take  by mouth Daily.        ED Medications:    Medications   sodium chloride 0.9 % flush 10 mL (has no administration in time range)   heparin (porcine) injection 3,700 Units (has no administration in time range)   heparin 74047 units/250 ml (100 units/ml) in D5W (has no administration in time range)   heparin (porcine) injection 3,090 Units (has no administration in time range)   heparin (porcine) injection 1,540 Units (has no administration in time range)   Pharmacy to Dose Heparin (has no administration in time range)   aspirin EC tablet 243 mg (243 mg Oral Given 12/13/22 1200)   insulin regular (humuLIN R,novoLIN R) injection 10 Units (10 Units Intravenous Given 12/13/22 1622)     Vital Signs:  Temp:  [97.6 °F (36.4 °C)] 97.6 °F (36.4 °C)  Heart Rate:  [60-79] 63  Resp:  [16] 16  BP: (120-145)/(46-52) 138/52        12/13/22  1124   Weight: 61.7 kg (136 lb)     Body mass index is 21.95 kg/m².    Physical Exam:     Most recent vital Signs: /52   Pulse 63   Temp 97.6 °F (36.4 °C) (Oral)   Resp 16   Ht 167.6 cm (66\")   Wt 61.7 kg (136 lb)   SpO2 97%   BMI 21.95 kg/m²     Physical Exam  Constitutional:       General: He is in acute distress.      Appearance: He is ill-appearing.   HENT:      Head: Normocephalic and atraumatic.      " Right Ear: External ear normal.      Left Ear: External ear normal.      Nose: Nose normal.      Mouth/Throat:      Mouth: Mucous membranes are moist.   Eyes:      Extraocular Movements: Extraocular movements intact.      Conjunctiva/sclera: Conjunctivae normal.   Cardiovascular:      Rate and Rhythm: Normal rate and regular rhythm.      Pulses: Normal pulses.      Heart sounds: Normal heart sounds. No murmur heard.     Comments: Pacemaker is palpated on the left upper chest.  Pulmonary:      Effort: Pulmonary effort is normal.      Breath sounds: Normal breath sounds. No wheezing or rales.   Abdominal:      General: Bowel sounds are normal.      Palpations: Abdomen is soft.      Tenderness: There is no abdominal tenderness. There is no guarding or rebound.      Comments: Surgical scar noted on the right upper quadrant.   Musculoskeletal:         General: Normal range of motion.      Cervical back: Neck supple. No rigidity.      Right lower leg: No edema.      Left lower leg: No edema.   Skin:     General: Skin is warm.      Findings: No erythema or rash.   Neurological:      Mental Status: He is alert and oriented to person, place, and time. Mental status is at baseline.   Psychiatric:         Mood and Affect: Mood normal.         Behavior: Behavior normal.       Laboratory data:    I have reviewed the labs done in the emergency room.    Results from last 7 days   Lab Units 12/13/22  1135   SODIUM mmol/L 130*   POTASSIUM mmol/L 5.0   CHLORIDE mmol/L 99   CO2 mmol/L 18.1*   BUN mg/dL 35*   CREATININE mg/dL 1.95*   CALCIUM mg/dL 8.4*   BILIRUBIN mg/dL 0.3   ALK PHOS U/L 139*   ALT (SGPT) U/L 28   AST (SGOT) U/L 26   GLUCOSE mg/dL 531*     Results from last 7 days   Lab Units 12/13/22  1135   WBC 10*3/mm3 8.28   HEMOGLOBIN g/dL 9.9*   HEMATOCRIT % 29.4*   PLATELETS 10*3/mm3 410         Results from last 7 days   Lab Units 12/13/22  1424 12/13/22  1135   TROPONIN T ng/mL 0.035* 0.039*     EKG:      EKG done in the  emergency room was reviewed by me.  It shows sinus rhythm at 66 bpm.  Normal axis.  Borderline LVH noted.  ST flattening noted in the lateral leads but no other significant ST-T changes were noted.    Radiology:    XR Chest 1 View    Result Date: 12/13/2022  PROCEDURE: XR CHEST 1 VW-  HISTORY: Chest Pain Triage Protocol  COMPARISON:  01/10/2020  FINDINGS:  Portable view of the chest demonstrates the lungs to be grossly clear. There is no evidence of effusion, pneumothorax or other significant pleural disease. The mediastinum is unremarkable.  The heart size is normal.  Right chest port is seen with tip in SVC. Left-sided pacer is present.      Unremarkable portable chest.  This report was signed and finalized on 12/13/2022 12:47 PM by Roque Aguirre MD.    Assessment:    1. Chest pain with borderline troponin elevation, POA.  2. Coronary artery disease status post PCI to circumflex in 2017.  3. 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  4. Sick sinus syndrome status post pacemaker.  5. 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  6. Chronic kidney disease stage III.  7. Acquired hypothyroidism.    Plan:    Mr. Singh is currently being admitted to the medical floor with telemetry for observation.  He does have borderline elevated troponin levels but is currently chest pain-free.  We will continue heparin drip per cardiology recommendation.  Patient will be continued on his home medications including aspirin and carvedilol.  We will consult Dr. Luque from cardiology.  We will obtain a 2D echocardiogram.    Patient does have significant hyperglycemia and we will place him on Levemir and subcutaneous insulin protocol for coverage.  We will keep him n.p.o. after midnight for in case he needs cardiac catheterization.    By his CODE STATUS is full code.    Risk Assessment: Moderate balance  DVT Prophylaxis: Heparin  Code Status: Full  Diet: Cardiac diabetic    Advance  Care Planning     ACP discussion was held with the patient during this visit. Patient does not have an advance directive, information provided.         Alberto Rodriguez MD  12/13/22  17:23 EST    Dictated utilizing Dragon dictation.

## 2022-12-14 ENCOUNTER — READMISSION MANAGEMENT (OUTPATIENT)
Dept: CALL CENTER | Facility: HOSPITAL | Age: 87
End: 2022-12-14

## 2022-12-14 ENCOUNTER — APPOINTMENT (OUTPATIENT)
Dept: CARDIOLOGY | Facility: HOSPITAL | Age: 87
End: 2022-12-14

## 2022-12-14 VITALS
HEIGHT: 66 IN | BODY MASS INDEX: 21.38 KG/M2 | HEART RATE: 60 BPM | DIASTOLIC BLOOD PRESSURE: 53 MMHG | SYSTOLIC BLOOD PRESSURE: 121 MMHG | TEMPERATURE: 97.5 F | OXYGEN SATURATION: 99 % | WEIGHT: 133 LBS | RESPIRATION RATE: 16 BRPM

## 2022-12-14 PROBLEM — I20.0 UNSTABLE ANGINA: Status: RESOLVED | Noted: 2022-12-13 | Resolved: 2022-12-14

## 2022-12-14 PROBLEM — C22.1 CHOLANGIOCARCINOMA METASTATIC TO LIVER (HCC): Status: ACTIVE | Noted: 2022-12-14

## 2022-12-14 PROBLEM — C78.7 CHOLANGIOCARCINOMA METASTATIC TO LIVER (HCC): Status: ACTIVE | Noted: 2022-12-14

## 2022-12-14 LAB
ANION GAP SERPL CALCULATED.3IONS-SCNC: 8.2 MMOL/L (ref 5–15)
BH CV ECHO LEFT VENTRICLE GLOBAL LONGITUDINAL STRAIN: -19.3 %
BH CV ECHO MEAS - AO MAX PG: 44.6 MMHG
BH CV ECHO MEAS - AO MEAN PG: 25 MMHG
BH CV ECHO MEAS - AO ROOT DIAM: 3.1 CM
BH CV ECHO MEAS - AO V2 MAX: 334 CM/SEC
BH CV ECHO MEAS - AO V2 VTI: 75.5 CM
BH CV ECHO MEAS - AVA(I,D): 1.14 CM2
BH CV ECHO MEAS - EDV(CUBED): 24.9 ML
BH CV ECHO MEAS - EDV(MOD-SP2): 77.9 ML
BH CV ECHO MEAS - EDV(MOD-SP4): 75.3 ML
BH CV ECHO MEAS - EF(MOD-BP): 70.6 %
BH CV ECHO MEAS - EF(MOD-SP2): 73.7 %
BH CV ECHO MEAS - EF(MOD-SP4): 66.8 %
BH CV ECHO MEAS - ESV(CUBED): 10.1 ML
BH CV ECHO MEAS - ESV(MOD-SP2): 20.5 ML
BH CV ECHO MEAS - ESV(MOD-SP4): 25 ML
BH CV ECHO MEAS - FS: 26 %
BH CV ECHO MEAS - IVS/LVPW: 1.44 CM
BH CV ECHO MEAS - IVSD: 1.71 CM
BH CV ECHO MEAS - LA DIMENSION: 4.1 CM
BH CV ECHO MEAS - LAT PEAK E' VEL: 7.3 CM/SEC
BH CV ECHO MEAS - LV MASS(C)D: 143.9 GRAMS
BH CV ECHO MEAS - LV MAX PG: 6.1 MMHG
BH CV ECHO MEAS - LV MEAN PG: 4 MMHG
BH CV ECHO MEAS - LV V1 MAX: 123 CM/SEC
BH CV ECHO MEAS - LV V1 VTI: 33 CM
BH CV ECHO MEAS - LVIDD: 2.9 CM
BH CV ECHO MEAS - LVIDS: 2.16 CM
BH CV ECHO MEAS - LVOT AREA: 2.6 CM2
BH CV ECHO MEAS - LVOT DIAM: 1.82 CM
BH CV ECHO MEAS - LVPWD: 1.19 CM
BH CV ECHO MEAS - MED PEAK E' VEL: 6.2 CM/SEC
BH CV ECHO MEAS - MV A MAX VEL: 99 CM/SEC
BH CV ECHO MEAS - MV DEC TIME: 0.26 MSEC
BH CV ECHO MEAS - MV E MAX VEL: 101 CM/SEC
BH CV ECHO MEAS - MV E/A: 1.02
BH CV ECHO MEAS - MV MAX PG: 4.7 MMHG
BH CV ECHO MEAS - MV MEAN PG: 2 MMHG
BH CV ECHO MEAS - MV V2 VTI: 36.8 CM
BH CV ECHO MEAS - MVA(VTI): 2.33 CM2
BH CV ECHO MEAS - PA ACC TIME: 0.06 SEC
BH CV ECHO MEAS - PA PR(ACCEL): 50.7 MMHG
BH CV ECHO MEAS - PA V2 MAX: 97.6 CM/SEC
BH CV ECHO MEAS - RV MAX PG: 2.9 MMHG
BH CV ECHO MEAS - RV V1 MAX: 85.7 CM/SEC
BH CV ECHO MEAS - RV V1 VTI: 19.9 CM
BH CV ECHO MEAS - SV(LVOT): 85.9 ML
BH CV ECHO MEAS - SV(MOD-SP2): 57.4 ML
BH CV ECHO MEAS - SV(MOD-SP4): 50.3 ML
BH CV ECHO MEAS - TAPSE (>1.6): 1.98 CM
BH CV ECHO MEAS - TR MAX PG: 20.6 MMHG
BH CV ECHO MEAS - TR MAX VEL: 227 CM/SEC
BH CV ECHO MEASUREMENTS AVERAGE E/E' RATIO: 14.96
BH CV XLRA - RV BASE: 3.4 CM
BH CV XLRA - TDI S': 7.8 CM/SEC
BUN SERPL-MCNC: 36 MG/DL (ref 8–23)
BUN/CREAT SERPL: 22.5 (ref 7–25)
CALCIUM SPEC-SCNC: 7.7 MG/DL (ref 8.6–10.5)
CHLORIDE SERPL-SCNC: 108 MMOL/L (ref 98–107)
CHOLEST SERPL-MCNC: 117 MG/DL (ref 0–200)
CO2 SERPL-SCNC: 22.8 MMOL/L (ref 22–29)
CREAT SERPL-MCNC: 1.6 MG/DL (ref 0.76–1.27)
DEPRECATED RDW RBC AUTO: 42.9 FL (ref 37–54)
EGFRCR SERPLBLD CKD-EPI 2021: 42 ML/MIN/1.73
ERYTHROCYTE [DISTWIDTH] IN BLOOD BY AUTOMATED COUNT: 13 % (ref 12.3–15.4)
GLUCOSE BLDC GLUCOMTR-MCNC: 100 MG/DL (ref 70–130)
GLUCOSE BLDC GLUCOMTR-MCNC: 107 MG/DL (ref 70–130)
GLUCOSE BLDC GLUCOMTR-MCNC: 54 MG/DL (ref 70–130)
GLUCOSE SERPL-MCNC: 70 MG/DL (ref 65–99)
HBA1C MFR BLD: 8.9 % (ref 4.8–5.6)
HCT VFR BLD AUTO: 27 % (ref 37.5–51)
HDLC SERPL-MCNC: 71 MG/DL (ref 40–60)
HGB BLD-MCNC: 9.3 G/DL (ref 13–17.7)
LDLC SERPL CALC-MCNC: 34 MG/DL (ref 0–100)
LDLC/HDLC SERPL: 0.52 {RATIO}
MAXIMAL PREDICTED HEART RATE: 135 BPM
MCH RBC QN AUTO: 32.4 PG (ref 26.6–33)
MCHC RBC AUTO-ENTMCNC: 34.4 G/DL (ref 31.5–35.7)
MCV RBC AUTO: 94.1 FL (ref 79–97)
PLATELET # BLD AUTO: 382 10*3/MM3 (ref 140–450)
PMV BLD AUTO: 10.4 FL (ref 6–12)
POTASSIUM SERPL-SCNC: 4.6 MMOL/L (ref 3.5–5.2)
RBC # BLD AUTO: 2.87 10*6/MM3 (ref 4.14–5.8)
SODIUM SERPL-SCNC: 139 MMOL/L (ref 136–145)
STRESS TARGET HR: 115 BPM
TRIGL SERPL-MCNC: 47 MG/DL (ref 0–150)
TROPONIN T SERPL-MCNC: 0.04 NG/ML (ref 0–0.03)
UFH PPP CHRO-ACNC: 0.2 IU/ML (ref 0.3–0.7)
UFH PPP CHRO-ACNC: 0.23 IU/ML (ref 0.3–0.7)
VLDLC SERPL-MCNC: 12 MG/DL (ref 5–40)
WBC NRBC COR # BLD: 8.69 10*3/MM3 (ref 3.4–10.8)

## 2022-12-14 PROCEDURE — 85520 HEPARIN ASSAY: CPT | Performed by: INTERNAL MEDICINE

## 2022-12-14 PROCEDURE — G0378 HOSPITAL OBSERVATION PER HR: HCPCS

## 2022-12-14 PROCEDURE — 83036 HEMOGLOBIN GLYCOSYLATED A1C: CPT | Performed by: INTERNAL MEDICINE

## 2022-12-14 PROCEDURE — 93306 TTE W/DOPPLER COMPLETE: CPT

## 2022-12-14 PROCEDURE — 99217 PR OBSERVATION CARE DISCHARGE MANAGEMENT: CPT | Performed by: INTERNAL MEDICINE

## 2022-12-14 PROCEDURE — 80061 LIPID PANEL: CPT | Performed by: INTERNAL MEDICINE

## 2022-12-14 PROCEDURE — 93356 MYOCRD STRAIN IMG SPCKL TRCK: CPT | Performed by: INTERNAL MEDICINE

## 2022-12-14 PROCEDURE — 80048 BASIC METABOLIC PNL TOTAL CA: CPT | Performed by: INTERNAL MEDICINE

## 2022-12-14 PROCEDURE — 93306 TTE W/DOPPLER COMPLETE: CPT | Performed by: INTERNAL MEDICINE

## 2022-12-14 PROCEDURE — 84484 ASSAY OF TROPONIN QUANT: CPT | Performed by: INTERNAL MEDICINE

## 2022-12-14 PROCEDURE — 96366 THER/PROPH/DIAG IV INF ADDON: CPT

## 2022-12-14 PROCEDURE — 99214 OFFICE O/P EST MOD 30 MIN: CPT | Performed by: INTERNAL MEDICINE

## 2022-12-14 PROCEDURE — 82962 GLUCOSE BLOOD TEST: CPT

## 2022-12-14 PROCEDURE — 93356 MYOCRD STRAIN IMG SPCKL TRCK: CPT

## 2022-12-14 PROCEDURE — 85027 COMPLETE CBC AUTOMATED: CPT | Performed by: INTERNAL MEDICINE

## 2022-12-14 RX ORDER — ROSUVASTATIN CALCIUM 20 MG/1
20 TABLET, COATED ORAL DAILY
COMMUNITY

## 2022-12-14 RX ORDER — CLOPIDOGREL BISULFATE 75 MG/1
75 TABLET ORAL DAILY
Status: DISCONTINUED | OUTPATIENT
Start: 2022-12-14 | End: 2022-12-14 | Stop reason: HOSPADM

## 2022-12-14 RX ORDER — CARVEDILOL 25 MG/1
12.5 TABLET ORAL DAILY
Start: 2022-12-14

## 2022-12-14 RX ORDER — ISOSORBIDE MONONITRATE 30 MG/1
30 TABLET, EXTENDED RELEASE ORAL
Status: DISCONTINUED | OUTPATIENT
Start: 2022-12-14 | End: 2022-12-14

## 2022-12-14 RX ORDER — ISOSORBIDE MONONITRATE 30 MG/1
30 TABLET, EXTENDED RELEASE ORAL
Qty: 30 TABLET | Refills: 0 | Status: SHIPPED | OUTPATIENT
Start: 2022-12-15

## 2022-12-14 RX ORDER — CLOPIDOGREL BISULFATE 75 MG/1
75 TABLET ORAL DAILY
Qty: 30 TABLET | Refills: 0 | Status: SHIPPED | OUTPATIENT
Start: 2022-12-14

## 2022-12-14 RX ORDER — AMLODIPINE BESYLATE 5 MG/1
2.5 TABLET ORAL DAILY
Start: 2022-12-14

## 2022-12-14 RX ORDER — ISOSORBIDE MONONITRATE 30 MG/1
30 TABLET, EXTENDED RELEASE ORAL
Status: DISCONTINUED | OUTPATIENT
Start: 2022-12-14 | End: 2022-12-14 | Stop reason: HOSPADM

## 2022-12-14 RX ADMIN — PANTOPRAZOLE SODIUM 40 MG: 40 TABLET, DELAYED RELEASE ORAL at 09:11

## 2022-12-14 RX ADMIN — CARVEDILOL 12.5 MG: 6.25 TABLET, FILM COATED ORAL at 09:11

## 2022-12-14 RX ADMIN — ASPIRIN 81 MG CHEWABLE TABLET 81 MG: 81 TABLET CHEWABLE at 09:11

## 2022-12-14 RX ADMIN — AMLODIPINE BESYLATE 2.5 MG: 5 TABLET ORAL at 09:11

## 2022-12-14 RX ADMIN — Medication 1 TABLET: at 09:11

## 2022-12-14 RX ADMIN — SENNOSIDES AND DOCUSATE SODIUM 2 TABLET: 50; 8.6 TABLET ORAL at 09:11

## 2022-12-14 RX ADMIN — CLOPIDOGREL BISULFATE 75 MG: 75 TABLET ORAL at 16:32

## 2022-12-14 RX ADMIN — ISOSORBIDE MONONITRATE 30 MG: 30 TABLET, EXTENDED RELEASE ORAL at 13:07

## 2022-12-14 NOTE — CONSULTS
"Adult Nutrition  Assessment/PES    Patient Name:  Ba Singh  YOB: 1935  MRN: [id number]  Admit Date:  12/13/2022    Assessment Date:  12/14/2022    Comments:    Pt w/ increased caloric needs d/t cancer dx.     1. Continue current diet order as medically appropriate.   2. Encourage PO intake to meet 50% of estimated needs.   3. Will order Boost Glucose Control TID.   4. Encourage intake of supplements ordered.     Will follow-up and available PRN.   Reason for Assessment     Row Name 12/14/22 1441          Reason for Assessment    Reason For Assessment diagnosis/disease state;identified at risk by screening criteria;nurse/nurse practitioner consult;per organizational policy     Diagnosis cancer diagnosis/related complications;diabetes diagnosis/complications;cardiac disease;renal disease     Identified At Risk by Screening Criteria MST SCORE 2+;unintentional loss of 10 lbs or more in the past 2 mos                  Labs/Tests/Procedures/Meds     Row Name 12/14/22 1442          Labs/Procedures/Meds    Lab Results Reviewed reviewed, pertinent     Lab Results Comments High: BUN, A1C, Cr        Medications    Pertinent Medications Reviewed reviewed, pertinent     Pertinent Medications Comments insulin, MVI w/ minerals, protonix, docusate                Physical Findings     Row Name 12/14/22 1443          Physical Findings    Overall Physical Appearance underwt for age                Estimated/Assessed Needs - Anthropometrics     Row Name 12/14/22 1444 12/14/22 1443       Anthropometrics    Height 167.6 cm (66\") --    Age for Calculations -- 87    Weight for Calculation -- 60.3 kg (133 lb)       Estimated/Assessed Needs    Additional Documentation -- Estimated Calorie Needs (Group);KCAL/KG (Group);Protein Requirements (Group);Fluid Requirements (Group)       Estimated Calorie Needs    Estimated Calorie Requirement (kcal/day) -- 2111       KCAL/KG    KCAL/KG -- 30 Kcal/Kg (kcal);35 Kcal/Kg " "(kcal)    30 Kcal/Kg (kcal) -- 1809.84    35 Kcal/Kg (kcal) -- 2111.48       Protein Requirements    Weight Used For Protein Calculations -- 60.3 kg (133 lb)    Est Protein Requirement Amount (gms/kg) -- 1.0 gm protein    Estimated Protein Requirements (gms/day) -- 60.33       Fluid Requirements    Fluid Requirements (mL/day) -- 2111  1mL/kcal    RDA Method (mL) -- 2111    Row Name 12/14/22 0906          Anthropometrics    Height 167.6 cm (66\")     Weight 60.3 kg (133 lb)                Nutrition Prescription Ordered     Row Name 12/14/22 1443          Nutrition Prescription PO    Current PO Diet Regular     Fluid Consistency Thin     Common Modifiers Cardiac;Consistent Carbohydrate                Evaluation of Received Nutrient/Fluid Intake     Row Name 12/14/22 1444          Intake Assessment    Energy/Calorie Requirement Assessment meeting needs     Protein Requirement Assessment meeting needs        PO Evaluation    Number of Days PO Intake Evaluated 1 day     Number of Meals 1     % PO Intake 75        Vitals    Height 167.6 cm (66\")                   Problem/Interventions:   Problem 1     Row Name 12/14/22 1444          Nutrition Diagnoses Problem 1    Problem 1 Increased Nutrient Needs     Macronutrient Kcal     Etiology (related to) Medical Diagnosis     Oncology Other (comment)  bile duct/liver cancer     Signs/Symptoms (evidenced by) Other (comment)  need for oral nutrition supplements to meet increased estimated needs.                      Intervention Goal     Row Name 12/14/22 144          Intervention Goal    General Maintain nutrition;Improved nutrition related lab(s);Reduce/improve symptoms;Meet nutritional needs for age/condition;Disease management/therapy     PO Establish PO;Tolerate PO;Increase intake;Maintain intake;Continue positive trend;Meet estimated needs     Weight Maintain weight                Nutrition Intervention     Row Name 12/14/22 1444          Nutrition Intervention    RD/Tech " Action Follow Tx progress;Care plan reviewd;Await begin PO;Encourage intake;Recommend/ordered     Recommended/Ordered Supplement;Snack                Nutrition Prescription     Row Name 12/14/22 1445          Nutrition Prescription PO    PO Prescription Begin/change supplement     Supplement Boost Glucose Control     Supplement Frequency 3 times a day;Snack time     Snack Times Bedtime     New PO Prescription Ordered? Yes        Other Orders    Obtain Weight Daily     Obtain Weight Ordered? No, recommended     Supplement Vitamin mineral supplement     Supplement Ordered? No, recommended     Labs Mg++;Na+;K+     Labs Ordered? No, recommended                Education/Evaluation     Row Name 12/14/22 1446          Education    Education Will Instruct as appropriate        Monitor/Evaluation    Monitor Per protocol;PO intake;Supplement intake;Pertinent labs;Weight;Skin status;Symptoms                 Electronically signed by:  Siddharth Hicks RD  12/14/22 14:46 EST

## 2022-12-14 NOTE — PROGRESS NOTES
HEPARIN INFUSION  Ba Singh is a  87 y.o. male receiving heparin infusion.     Therapy for (VTE/Cardiac):   Yes  Patient Weight: 61.7 kg  Initial Bolus (Y/N):   3700 units  Any Bolus (Y/N):   Yes        Signs or Symptoms of Bleeding: no    Cardiac or Other (Not VTE)   Initial Bolus: 60 units/kg (Max 4,000 units)  Initial rate: 12 units/kg/hr (Max 1,000 units/hr)   Anti Xa Rebolus Infusion Hold time Change infusion Dose (Units/kg/hr) Next Anti Xa or aPTT Level Due   < 0.11 50 Units/kg  (4000 Units Max) None Increase by  3 Units/kg/hr 6 hours   0.11- 0.19 25 Units/kg  (2000 Units Max) None Increase by  2 Units/kg/hr 6 hours   0.2 - 0.29 0 None Increase by  1 Units/kg/hr 6 hours   0.3 - 0.5 0 None No Change 6 hours (after 2 consecutive levels in range check qAM)   0.51 - 0.6 0 None Decrease by  1 Units/kg/hr 6 hours   0.61 - 0.8 0 30 Minutes Decrease by  2 Units/kg/hr 6 hours   0.81 - 1 0 60 Minutes Decrease by  3 Units/kg/hr 6 hours   >1 0 Hold  After Anti Xa less than 0.5 decrease previous rate by  4 Units/kg/hr  Every 2 hours until Anti Xa  less than 0.5 then when infusion restarts in 6 hours         Recommend anti-Xa every 6 hours.         Lab 12/14/22  0615 12/13/22  2304 12/13/22  1652 12/13/22  1135   HEMOGLOBIN 9.3*  --   --  9.9*   HEMATOCRIT 27.0*  --   --  29.4*   PLATELETS 382  --   --  410   PROTIME  --   --  14.1  --    APTT  --   --  31.5*  --    HEPARIN ANTI-XA 0.23* 0.30 0.10*  --    CREATININE 1.60*  --   --  1.95*   EGFR 42.0*  --   --  33.1*             Date   Time   Anti-Xa Current Rate (Unit/kg/hr) Bolus   (Units) Rate Change   (Unit/kg/hr) New Rate (Unit/kg/hr) Next   Anti-Xa Comments  Pump Check Daily   12/13/22 1616 0.10 12 u/kg/hr 3700 u   2300    12/14/22 0000 0.30 12 u/kg/hr 0 0 12 u/kg/hr 0600    12/14/22 0615 0.23 12 0 +1 13 1400                                                                                                                                                                                                             Pharmacy will continue to follow anti-Xa results and monitor for signs and symptoms of bleeding or thrombosis.    Thank you for the consult,    Hermelinda BloodD, BCPS   12/14/22 07:44 EST

## 2022-12-14 NOTE — NURSING NOTE
" Latest Reference Range & Units 12/14/22 09:21   Glucose 70 - 130 mg/dL 54 (L)   (L): Data is abnormally low    Patient c/o of \"sweaty and chilled\". Patient NPO overnight. Bedside glucose checked, two apple juices given to patient and breakfast tray ordered. Dr. Rodriguez made aware.     1011 Patient blood sugar improved to 100  "

## 2022-12-14 NOTE — PLAN OF CARE
Goal Outcome Evaluation:      New admit from ER. Heparin drip infusing. Chest pain 0/10. VSS, 4L NC for comfort and to maintain oxygen saturations. Urinal within reach, chemotherapy precautions in place. Will continue to monitor closely overnight. NPO after MN for possible heart cath. Spouse updated by phone

## 2022-12-14 NOTE — CASE MANAGEMENT/SOCIAL WORK
Discharge Planning Assessment  [facility]     Patient Name: Ba Singh  MRN: [id number]  Today's Date: 2022    Admit Date: 2022    Plan: SW met with pt at bedside to inititate discharge planning. Pt alert and very pleasant. Confims his name//pcp/address. Pts [facility]. Pt is independent with ADL's. Has a walker at home but does not utilize it. Is currently wearing oxygen but states he does not have home oxygen.  Declines STR/HH as [facility].  Pts spouse will provide transport to home at discharge.   Discharge Needs Assessment     Row Name 22 1027       Living Environment    People in Home spouse    Name(s) of People in Home Marisol Singh    Current Living Arrangements home    Potentially Unsafe Housing Conditions unable to assess    Primary Care Provided by self    Provides Primary Care For no one    Family Caregiver if Needed spouse    Quality of Family Relationships helpful;involved    Able to Return to Prior Arrangements yes       Transition Planning    Patient/Family Anticipates Transition to home    Patient/Family Anticipated Services at Transition none    Transportation Anticipated family or friend will provide       Discharge Needs Assessment    Readmission Within the Last 30 Days no previous admission in last 30 days    Equipment Currently Used at Home none    Concerns to be Addressed no discharge needs identified    Equipment Needed After Discharge none    Provided Post Acute Provider List? N/A    Provided Post Acute Provider Quality & Resource List? N/A               Discharge Plan     Row Name 22 1028       Plan    Plan SW met with pt at bedside to inititate discharge planning. Pt alert and very pleasant. Confims his name//pcp/address. Pts [facility] is [facility]. Pt is independent with ADL's. Has a walker at home but does not utilize it. Is  currently wearing oxygen but states he does not have home oxygen.  Declines STR/HH as [facility].  Pts spouse will provide transport to home at discharge.    Patient/Family in Agreement with Plan yes              Continued Care and Services - Admitted Since 12/13/2022    Coordination has not been started for this encounter.          Demographic Summary     Row Name 12/14/22 1025       General Information    Admission Type observation    Arrived From home    Referral Source admission list    Reason for Consult discharge planning    Preferred Language English       Contact Information    Permission Granted to Share Info With     Contact Information Obtained for                Functional Status     Row Name 12/14/22 1025       Functional Status    Usual Activity Tolerance good    Current Activity Tolerance moderate       Functional Status, IADL    Medications independent    Meal Preparation independent    Housekeeping independent    Laundry independent    Shopping independent       Mental Status    General Appearance WDL WDL       Mental Status Summary    Recent Changes in Mental Status/Cognitive Functioning no changes       Employment/    Employment Status retired    Current or Previous Occupation                Psychosocial     Row Name 12/14/22 1026       Values/Beliefs    Spiritual, Cultural Beliefs, [employer] Practices, Values that Affect Care no       Behavior WDL    Behavior WDL WDL       Emotion Mood WDL    Emotion/Mood/Affect WDL WDL       Speech WDL    Speech WDL WDL       Perceptual State WDL    Perceptual State WDL WDL       Thought Process WDL    Thought Process WDL WDL       Intellectual Performance WDL    Intellectual Performance WDL WDL       Coping/Stress    Major Change/Loss/Stressor none    Patient Personal Strengths motivated;positive attitude;strong support system    Sources of Support spouse    Reaction to Health  Status accepting;realistic    Understanding of Condition and Treatment adequate understanding of medical condition       Developmental Stage (Eriksson's)    Developmental Stage Stage 8 (65 years-death/Late Adulthood) Integrity vs. Despair       C-SSRS (Recent)    Q1 Wished to be Dead (Past Month) no    Q2 Suicidal Thoughts (Past Month) no    Q6 Suicide Behavior (Lifetime) no       Violence Risk    Feels Like Hurting Others no    Previous Attempt to Harm Others no               Abuse/Neglect    No documentation.                Legal    No documentation.                Substance Abuse    No documentation.                Patient Forms    No documentation.                   Yari Segal

## 2022-12-14 NOTE — DISCHARGE INSTR - APPOINTMENTS
**Dr. Escamilla from [facility] will call you with follow up appt**    **Dr. Anderson's office will call with follow up appt details**

## 2022-12-14 NOTE — CONSULTS
"     [facility] Cardiology Consult Note    Ba Singh  1935  [id number]  12/14/22    Requesting Physician: No ref. provider found    Chief Complaint: Chest pain    History of Present Illness:   Mr. Ba Singh is a 87 y.o. male who is being seen by Cardiology for evaluation of chest pain.  The patient has a past medical history significant for type 2 diabetes mellitus, chronic kidney disease stage III, and hypertension.  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  He has undergone cholecystectomy as well as partial liver resection.  Unfortunately, he has had recurrence of disease and is currently undergoing chemotherapy at the [facility].  He has a past cardiac history significant for an NSTEMI in 2017.  At that time, he was found to have severe stenosis in the proximal LCx as the culprit and underwent PCI with LINDSEY x1.  He was also noted to have severe stenosis at the ostium of a moderate caliber diagonal branch which was managed medically.  He presented to the [facility] yesterday for evaluation of chest pain.  The patient reports he was hauling [facility], when he separately developed a substernal chest discomfort.  The discomfort was described as a \"squeezing\" sensation in his bilateral anterior chest.  He does report mild dyspnea associated with the episode.  No associated nausea, vomiting, or diaphoresis.  Given his chest discomfort, he presented to the emergency department for evaluation.    Upon initial presentation, an ECG did not show any acute ischemic changes.  On initial labs, he was found to be significantly hyperglycemic with an initial glucose of 531.  His creatinine was elevated at 1.95, which is consistent with his chronic kidney disease.  He was also anemic with a hemoglobin of 9.9.  An initial troponin was found to be mildly elevated at 0.039, however his trended up " to 0.064 this morning.  He was started on heparin on admission, and cardiology has been consulted for further recommendations.  Since admission, the patient has not had any recurrent episodes of chest pain or chest discomfort.  Reports he is currently feeling well.  No specific complaints this morning.    Prior Cardiac Testin. Last Coronary Angio: 10/17   1.  Severe one-vessel coronary artery disease involving the proximal LCx    -Successful PCI with placement of a 2.75 x 15 mm Xience LINDSEY  2. Prior Stress Testing: None  3. Last Echo: 10/17   1.  LVEF 56%   2.  Mild concentric LVH   3.  Grade 1 diastolic dysfunction   4.  Calcification of aortic valve without significant stenosis   5.  Mild MR  4. Prior Holter Monitor: None    Review of Systems:   Review of Systems   Constitutional: Negative for activity change, appetite change, chills, diaphoresis, fatigue, fever, unexpected weight gain and unexpected weight loss.   Eyes: Negative for blurred vision and double vision.   Respiratory: Positive for chest tightness and shortness of breath. Negative for cough and wheezing.    Cardiovascular: Negative for chest pain, palpitations and leg swelling.   Gastrointestinal: Negative for abdominal pain, anal bleeding, blood in stool and GERD.   Endocrine: Negative for cold intolerance and heat intolerance.   Genitourinary: Negative for hematuria.   Neurological: Negative for dizziness, syncope, weakness and light-headedness.   Hematological: Does not bruise/bleed easily.   Psychiatric/Behavioral: Negative for depressed mood and stress. The patient is not nervous/anxious.        Past Medical History:   Past Medical History:   Diagnosis Date   • Bradycardia    • Colon polyp    • Diabetes mellitus (HCC)    • Disease of thyroid gland    • History of stomach ulcers    • Hypertension    • Impaired functional mobility, balance, gait, and endurance    • Myocardial infarction (HCC)        Past Surgical History:   Past Surgical  History:   Procedure Laterality Date   • CARDIAC CATHETERIZATION N/A 10/22/2017    Procedure: Coronary angiography;  Surgeon: Siddhartha Hicks MD;  Location: [facility] CATH INVASIVE LOCATION;  Service:    • CARDIAC CATHETERIZATION N/A 10/22/2017    Procedure: Left Heart Cath;  Surgeon: Siddhartha Hicks MD;  Location: [facility] CATH INVASIVE LOCATION;  Service:    • CARDIAC CATHETERIZATION N/A 10/22/2017    Procedure: Angioplasty-coronary;  Surgeon: Siddhartha Hicks MD;  Location: [facility] CATH INVASIVE LOCATION;  Service:    • CHOLECYSTECTOMY WITH INTRAOPERATIVE CHOLANGIOGRAM N/A 2020    Procedure: CHOLECYSTECTOMY LAPAROSCOPIC INTRAOPERATIVE CHOLANGIOGRAM;  Surgeon: Perla Baez MD;  Location: [facility] OR;  Service: General   • ORTHOPEDIC SURGERY     • PACEMAKER IMPLANTATION     • [place] RT/LT HEART CATHETERS N/A 10/22/2017    Procedure: Percutaneous Coronary Intervention;  Surgeon: Siddhartha Hicks MD;  Location: [facility] CATH INVASIVE LOCATION;  Service: Cardiovascular   • ROTATOR CUFF REPAIR Left 2015   • VAGOTOMY AND PYLOROPLASTY  1970    Ex-lap with ulcer repair (suspected Berto patch) with vagotomy and pyloroplasty for perforated peptic ulcer       Family History:   Family History   Problem Relation Age of Onset   • Heart attack Mother    • Arrhythmia Mother         PACEMAKER   • Hyperlipidemia Mother    • Hypertension Mother    • Thyroid disease Mother    • Stroke Mother    • Obesity Mother        Social History:   Social History     Socioeconomic History   • Marital status:    Tobacco Use   • Smoking status: Former     Types: Cigarettes     Quit date:      Years since quittin.9   • Smokeless tobacco: Never   Substance and Sexual Activity   • Alcohol use: No   • Drug use: No   • Sexual activity: Defer       Medications:     Current Facility-Administered Medications:   •  acetaminophen (TYLENOL) tablet 650 mg, 650 mg, Oral, Q4H PRN **OR** acetaminophen (TYLENOL) 160 MG/5ML solution 650 mg, 650  mg, Oral, Q4H PRN **OR** acetaminophen (TYLENOL) suppository 650 mg, 650 mg, Rectal, Q4H PRN, Alberto Rodriguez MD  •  amLODIPine (NORVASC) tablet 2.5 mg, 2.5 mg, Oral, Daily, Alberto Rodriguez MD  •  aspirin chewable tablet 81 mg, 81 mg, Oral, Daily, Alberto Rodriguez MD  •  sennosides-docusate (PERICOLACE) 8.6-50 MG per tablet 2 tablet, 2 tablet, Oral, BID **AND** polyethylene glycol (MIRALAX) packet 17 g, 17 g, Oral, Daily PRN **AND** bisacodyl (DULCOLAX) EC tablet 5 mg, 5 mg, Oral, Daily PRN **AND** bisacodyl (DULCOLAX) suppository 10 mg, 10 mg, Rectal, Daily PRN, Alberto Rodriguez MD  •  carvedilol (COREG) tablet 12.5 mg, 12.5 mg, Oral, Daily, Alberto Rodriguez MD  •  dextrose (D50W) (25 g/50 mL) IV injection 25 g, 25 g, Intravenous, Q15 Min PRN, Alberto Rodriguez MD  •  dextrose (GLUTOSE) oral gel 15 g, 15 g, Oral, Q15 Min PRN, Alberto Rodriguez MD  •  glucagon (human recombinant) (GLUCAGEN DIAGNOSTIC) injection 1 mg, 1 mg, Intramuscular, Q15 Min PRN, Alberto Rodriguez MD  •  heparin (porcine) injection 1,540 Units, 25 Units/kg, Intravenous, PRN, Heriberto Wade, DO  •  heparin (porcine) injection 3,090 Units, 50 Units/kg, Intravenous, PRN, Heriberto Wade, DO  •  heparin 76764 units/250 ml (100 units/ml) in D5W, 13 Units/kg/hr, Intravenous, Titrated, Alberto Rodriguez MD, Last Rate: 8.02 mL/hr at 12/14/22 0748, 13 Units/kg/hr at 12/14/22 0748  •  Insulin Aspart (novoLOG) injection 0-9 Units, 0-9 Units, Subcutaneous, TID AC, Alberto Rodriguez MD, 7 Units at 12/13/22 1840  •  insulin detemir (LEVEMIR) injection 20 Units, 20 Units, Subcutaneous, Nightly, Alberto Rodriguez MD, 20 Units at 12/13/22 2107  •  multivitamin with minerals 1 tablet, 1 tablet, Oral, Daily, Alberto Rodriguez MD  •  ondansetron (ZOFRAN) injection 4 mg, 4 mg, Intravenous, Q6H PRN, Alberto Rodriguez MD  •  oxyCODONE-acetaminophen (PERCOCET) 5-325 MG per tablet 1 tablet, 1 tablet, Oral, Q6H PRN, Alberto Rodriguez MD  •  pantoprazole (PROTONIX) EC tablet 40 mg, 40 mg, Oral, Daily, Alberto Rodriguez,  "MD  •  Pharmacy to Dose Heparin, , Does not apply, Continuous PRN, Heriberto Wade, DO  •  sodium chloride 0.9 % flush 10 mL, 10 mL, Intravenous, PRN, Emergency, Triage Protocol, MD  •  sodium chloride 0.9 % flush 3 mL, 3 mL, Intravenous, Q12H, Alberto Rodriguez MD  •  sodium chloride 0.9 % flush 3-10 mL, 3-10 mL, Intravenous, PRN, Alberto Rodriguez MD  •  sodium chloride 0.9 % infusion 40 mL, 40 mL, Intravenous, PRN, Alberto Rodriguez MD    Allergies:   Allergies   Allergen Reactions   • Novocain [Procaine] Other (See Comments)     Side pain       Physical Exam:  Vital Signs:   Vitals:    12/13/22 2030 12/13/22 2313 12/14/22 0300 12/14/22 0335   BP: 133/57 129/50 138/54    BP Location: Left arm Left arm Left arm    Patient Position: Lying Lying Lying    Pulse: 61 60 64    Resp: 16 16 17    Temp: 98.1 °F (36.7 °C) 98 °F (36.7 °C) 98.1 °F (36.7 °C)    TempSrc: Temporal Temporal Temporal    SpO2: 95% 96% 100% 97%   Weight: 60.6 kg (133 lb 11.2 oz)      Height: 167.6 cm (66\")          Physical Exam  Vitals and nursing note reviewed.   Constitutional:       General: He is not in acute distress.     Appearance: Normal appearance. He is not diaphoretic.   HENT:      Head: Normocephalic and atraumatic.   Cardiovascular:      Rate and Rhythm: Normal rate and regular rhythm.      Comments: 3/6 systolic murmur over the aortic area  Pulmonary:      Effort: Pulmonary effort is normal. No respiratory distress.      Breath sounds: Normal breath sounds. No stridor. No wheezing, rhonchi or rales.   Abdominal:      General: Bowel sounds are normal. There is no distension.      Palpations: Abdomen is soft.      Tenderness: There is no abdominal tenderness. There is no guarding or rebound.   Musculoskeletal:         General: No swelling. Normal range of motion.      Cervical back: Neck supple. No tenderness.   Skin:     General: Skin is warm and dry.   Neurological:      General: No focal deficit present.      Mental Status: He is alert and " oriented to person, place, and time.   Psychiatric:         Mood and Affect: Mood normal.         Behavior: Behavior normal.         Results Review:   Results from last 7 days   Lab Units 12/14/22  0615 12/13/22  1135   SODIUM mmol/L 139 130*   POTASSIUM mmol/L 4.6 5.0   CHLORIDE mmol/L 108* 99   CO2 mmol/L 22.8 18.1*   BUN mg/dL 36* 35*   CREATININE mg/dL 1.60* 1.95*   CALCIUM mg/dL 7.7* 8.4*   BILIRUBIN mg/dL  --  0.3   ALK PHOS U/L  --  139*   ALT (SGPT) U/L  --  28   AST (SGOT) U/L  --  26   GLUCOSE mg/dL 70 531*     Results from last 7 days   Lab Units 12/14/22  0615 12/13/22  1424 12/13/22  1135   TROPONIN T ng/mL 0.064* 0.035* 0.039*     Results from last 7 days   Lab Units 12/14/22  0615 12/13/22  1135   WBC 10*3/mm3 8.69 8.28   HEMOGLOBIN g/dL 9.3* 9.9*   HEMATOCRIT % 27.0* 29.4*   PLATELETS 10*3/mm3 382 410     Results from last 7 days   Lab Units 12/13/22  1652   INR  1.06   APTT seconds 31.5*         Results from last 7 days   Lab Units 12/14/22  0615   CHOLESTEROL mg/dL 117   TRIGLYCERIDES mg/dL 47   HDL CHOL mg/dL 71*   LDL CHOL mg/dL 34       I personally viewed and interpreted the patient's EKG/Telemetry data     Assessment:  1.  Coronary artery disease / NSTEMI  2.  Sick sinus syndrome, permanent pacemaker in place  3.  Recurrent, metastatic cholangiocarcinoma  4.  Type 2 diabetes mellitus  5.  Stage III chronic kidney disease  6.  Acquired hypothyroidism  7.  Moderate aortic stenosis    Plan:  1.  Coronary artery disease / NSTEMI  -- Known history of severe one-vessel coronary artery disease with PCI to the LCx in 2017, was noted to have severe stenosis at the ostium of a small to moderate caliber diagonal branch at that time  --Exertional chest discomfort with minimal troponin elevation consistent with suspected type I NSTEMI  -- ECG without significant ischemic changes  -- Troponin level remains minimally elevated, chest pain-free since admission  -- Echocardiogram shows normal LV systolic  function without any regional wall motion abnormalities  -- Discussed proceeding with coronary angiography given suspected type I NSTEMI, however given the patient's underlying CKD as well as recurrent metastatic cholangiocarcinoma and no recurrent chest pain since admission, he has opted to defer invasive management of his CAD at this time  -- Will start DAPT with aspirin and Plavix  -- Continue carvedilol and add isosorbide as antianginal therapy  -- Will repeat troponin, if no significant rise in troponin can then stop heparin drip  -- If significant rise in troponin or recurrent anginal symptoms during ambulation today, would then rediscuss coronary angiography    2.  Sick sinus syndrome  -- Has permanent pacemaker in place    3.  Aortic stenosis  -- Moderate  -- Routine surveillance per his primary cardiologist as outpatient        Thank you for allowing me to participate in the care of your patient. Please do not hesitate to contact me with additional questions or concerns.     MARIA ESTHER Luque MD  Interventional Cardiology   12/14/22  08:36 EST

## 2022-12-14 NOTE — PROGRESS NOTES
HEPARIN INFUSION  Ba Singh is a  87 y.o. male receiving heparin infusion.     Therapy for (VTE/Cardiac):   Cardiac  Patient Weight: 61.7 kg  Initial Bolus (Y/N):   3700 units  Any Bolus (Y/N):   Yes        Signs or Symptoms of Bleeding: no    Cardiac or Other (Not VTE)   Initial Bolus: 60 units/kg (Max 4,000 units)  Initial rate: 12 units/kg/hr (Max 1,000 units/hr)   Anti Xa Rebolus Infusion Hold time Change infusion Dose (Units/kg/hr) Next Anti Xa or aPTT Level Due   < 0.11 50 Units/kg  (4000 Units Max) None Increase by  3 Units/kg/hr 6 hours   0.11- 0.19 25 Units/kg  (2000 Units Max) None Increase by  2 Units/kg/hr 6 hours   0.2 - 0.29 0 None Increase by  1 Units/kg/hr 6 hours   0.3 - 0.5 0 None No Change 6 hours (after 2 consecutive levels in range check qAM)   0.51 - 0.6 0 None Decrease by  1 Units/kg/hr 6 hours   0.61 - 0.8 0 30 Minutes Decrease by  2 Units/kg/hr 6 hours   0.81 - 1 0 60 Minutes Decrease by  3 Units/kg/hr 6 hours   >1 0 Hold  After Anti Xa less than 0.5 decrease previous rate by  4 Units/kg/hr  Every 2 hours until Anti Xa  less than 0.5 then when infusion restarts in 6 hours         Recommend anti-Xa every 6 hours.         Lab 12/14/22  1401 12/14/22  0615 12/13/22  2304 12/13/22  1652 12/13/22  1135   HEMOGLOBIN  --  9.3*  --   --  9.9*   HEMATOCRIT  --  27.0*  --   --  29.4*   PLATELETS  --  382  --   --  410   PROTIME  --   --   --  14.1  --    APTT  --   --   --  31.5*  --    HEPARIN ANTI-XA 0.20* 0.23* 0.30 0.10*  --    CREATININE  --  1.60*  --   --  1.95*   EGFR  --  42.0*  --   --  33.1*             Date   Time   Anti-Xa Current Rate (Unit/kg/hr) Bolus   (Units) Rate Change   (Unit/kg/hr) New Rate (Unit/kg/hr) Next   Anti-Xa Comments  Pump Check Daily   12/13/22 1616 0.10 12 u/kg/hr 3700 u   2300    12/14/22 0000 0.30 12 u/kg/hr 0 0 12 u/kg/hr 0600    12/14/22 0615 0.23 12 0 +1 13 1400 D/W BOBBI Robles   12/14/22 1401 0.20 13 0 +1 14 2115 D/W BOBBI Robles                                                                                                                                                                                                 Pharmacy will continue to follow anti-Xa results and monitor for signs and symptoms of bleeding or thrombosis.    Thank you for the consult,    Hermelinda BloodD, BCPS   12/14/22 14:59 EST

## 2022-12-14 NOTE — CASE MANAGEMENT/SOCIAL WORK
Case Management Discharge Note           Provided Post Acute Provider List?: N/A  Provided Post Acute Provider Quality & Resource List?: N/A    Selected Continued Care - Admitted Since 12/13/2022     Destination    No services have been selected for the patient.              Durable Medical Equipment    No services have been selected for the patient.              Dialysis/Infusion    No services have been selected for the patient.              Home Medical Care    No services have been selected for the patient.              Therapy    No services have been selected for the patient.              Community Resources    No services have been selected for the patient.              Community & DME    No services have been selected for the patient.                  Transportation Services  Private: Car    Final Discharge Disposition Code: 01 - home or self-care

## 2022-12-14 NOTE — DISCHARGE SUMMARY
[facility]      Name:  Ba Singh   Age:  87 y.o.  Sex:  male  :  1935  MRN:  [id number]   Visit Number:  [id number]    Admission Date:  2022  Date of Discharge:  2022  Primary Care Physician:  Devika Escamilla DO    Important issues to note:    1.  Patient was treated for chest pain with borderline elevation of troponin in the setting of chronic kidney disease and prior history of coronary artery disease and stenting.  Patient's troponin levels trended down and he is currently chest pain-free.  He was seen by Dr. Luque from cardiology who recommended adding Imdur and Plavix to his current treatment regimen.  Patient is already on aspirin, carvedilol and Crestor.  2.  Follow-up with primary cardiologist in 2 weeks.  3.  Follow-up with primary care physician in 1 week.    Discharge Diagnoses:     1. Chest pain with borderline troponin elevation, POA.  2. Coronary artery disease status post PCI to circumflex in .  3. 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  4. Sick sinus syndrome status post pacemaker.  5. 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  6. Chronic kidney disease stage III.  7. Acquired hypothyroidism.    Problem List:     Active Hospital Problems    Diagnosis  POA   • Cholangiocarcinoma metastatic to liver (HCC) [C22.1, C78.7]  Yes   • Chronic kidney disease, stage III (moderate) (HCC) [N18.30]  Yes   • Type 2 diabetes mellitus with nephropathy (HCC) [E11.21]  Yes   • Sick sinus syndrome (HCC) [I49.5]  Yes   • Essential hypertension [I10]  Yes      Resolved Hospital Problems    Diagnosis Date Resolved POA   • **Unstable angina (HCC) [I20.0] 2022 Yes     Presenting Problem:    Chief Complaint   Patient presents with   • Chest Pain     Chest pain that started last night. Pt reports pain is worse with walking/movement.  Pt is on chemotherapy  with last treatment yesterday.       Consults:     Consulting Physician(s)     Provider Relationship Specialty    Ned Luque MD Consulting Physician Cardiology        Procedures Performed:    None.    History of presenting illness/Hospital Course:    Mr. 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  Patient states that his chest pain started yesterday after his chemotherapy treatment.  He states that he goes to chemotherapy every 12 days at [facility] in [place].  After he came home he started having retrosternal chest pain that was constant nonradiating.  He did not have any palpitations or sweating.  No history of any radiation.    Denied any pain on deep inspiration. Patient does follow-up with Dr. Hicks who is his primary cardiologist.     In the emergency room, he was afebrile with initial heart rate of 68 and blood pressure of 145/46.  Pulse oxygen saturation was 98% on room air.  Blood work done in the emergency room was remarkable for a glucose of 531, sodium 130, BUN 35, creatinine 1.95 (baseline) and hemoglobin of 9.9.  LFT, WBC and white count was within normal limits.  His initial troponin was 0.039 and a repeat troponin was 0.035.  EKG did not show any acute ST-T changes.  Chest x-ray was unremarkable.  Patient's treatment plan was discussed with Dr. Luque from cardiology and he recommended admission for overnight observation.  Patient received full dose aspirin, 10 units of insulin in the emergency room and was initiated on heparin drip.    Patient was admitted to the medical floor with telemetry.  He did not have any further episodes of chest pain.  He was maintained on heparin drip overnight.  Repeat troponin showed but trended  downwards.  A 2D echocardiogram was done did not show any regional wall motion abnormalities.  Patient was evaluated by Dr. Luque from cardiology and he did give the patient options of cardiac catheterization versus conservative medical therapy especially in view of his underlying malignancy and the fact that he is at high risk for worsening renal failure due to contrast use.  After weighing the risks and benefits, the patient chose to continue with medical therapy.  Dr. Luque recommended addition of Imdur and Plavix to his medical therapy.  Patient is already on aspirin and rosuvastatin.  He is advised to follow-up with cardiologist at [facility] in 2 weeks.  Follow-up with primary care provider in 1 week.  I have discussed the patient's condition and discharge plan with his wife who is at the bedside.    Vital Signs:    Temp:  [96.5 °F (35.8 °C)-98.1 °F (36.7 °C)] 97.5 °F (36.4 °C)  Heart Rate:  [60-64] 60  Resp:  [15-17] 16  BP: (119-141)/(47-57) 121/53    Physical Exam:    General Appearance:  Alert and cooperative.   Head:  Atraumatic and normocephalic.   Eyes: Conjunctivae and sclerae normal, no icterus. No pallor.   Ears:  Ears with no abnormalities noted.   Throat: No oral lesions, no thrush, oral mucosa moist.   Neck: Supple, trachea midline, no thyromegaly.   Back:   No kyphoscoliosis present. No tenderness to palpation.   Lungs:   Breath sounds heard bilaterally equally.  No crackles or wheezing. No Pleural rub or bronchial breathing.   Heart:  Normal S1 and S2, no murmur, no gallop, no rub. No JVD.   Abdomen:   Normal bowel sounds, no masses, no organomegaly. Soft, nontender, nondistended, no rebound tenderness.  Surgical scar noted on the abdominal wall.   Extremities: Supple, no edema, no cyanosis, no clubbing.   Pulses: Pulses palpable bilaterally.   Skin: No bleeding or rash.   Neurologic: Alert and oriented x 3. No facial asymmetry. Moves all four limbs. No tremors.     Pertinent Lab Results:      Results from last 7 days   Lab Units 12/14/22  0615 12/13/22  1135   SODIUM mmol/L 139 130*   POTASSIUM mmol/L 4.6 5.0   CHLORIDE mmol/L 108* 99   CO2 mmol/L 22.8 18.1*   BUN mg/dL 36* 35*   CREATININE mg/dL 1.60* 1.95*   CALCIUM mg/dL 7.7* 8.4*   BILIRUBIN mg/dL  --  0.3   ALK PHOS U/L  --  139*   ALT (SGPT) U/L  --  28   AST (SGOT) U/L  --  26   GLUCOSE mg/dL 70 531*     Results from last 7 days   Lab Units 12/14/22  0615 12/13/22  1135   WBC 10*3/mm3 8.69 8.28   HEMOGLOBIN g/dL 9.3* 9.9*   HEMATOCRIT % 27.0* 29.4*   PLATELETS 10*3/mm3 382 410     Results from last 7 days   Lab Units 12/13/22  1652   INR  1.06     Results from last 7 days   Lab Units 12/14/22  1401 12/14/22  0615 12/13/22  1424   TROPONIN T ng/mL 0.044* 0.064* 0.035*     Pertinent Radiology Results:    Imaging Results (All)     Procedure Component Value Units Date/Time    XR Chest 1 View [[id number]] Collected: 12/13/22 1227     Updated: 12/13/22 1249    Narrative:      PROCEDURE: XR CHEST 1 VW-     HISTORY: Chest Pain Triage Protocol     COMPARISON:  01/10/2020     FINDINGS:  Portable view of the chest demonstrates the lungs to be  grossly clear. There is no evidence of effusion, pneumothorax or other  significant pleural disease. The mediastinum is unremarkable.     The heart size is normal.  Right chest port is seen with tip in SVC.  Left-sided pacer is present.       Impression:      Unremarkable portable chest.      This report was signed and finalized on 12/13/2022 12:47 PM by Roque Aguirre MD.        Echo:    Results for orders placed during the hospital encounter of 12/13/22    Adult Transthoracic Echo Complete W/ Cont if Necessary Per Protocol    Interpretation Summary  1.  Normal left ventricular size and systolic function, LVEF 65-70%.  2.  Mild concentric LVH.  3.  Indeterminate LV diastolic filling pattern.  4.  Normal right ventricular size and systolic function by TAPSE.  5.  Borderline increased left atrial volume index.  6.   "Moderate to severe calcification aortic valve with moderate aortic stenosis (V-max 346 cm/s, mean gradient 25 mmHg, max 45 mmHg).  7.  Mild aortic regurgitation.    Condition on Discharge:     Stable.    Code status during the hospital stay:    Code Status and Medical Interventions:   Ordered at: 12/13/22 181     Code Status (Patient has no pulse and is not breathing):    CPR (Attempt to Resuscitate)     Medical Interventions (Patient has pulse or is breathing):    Full Support     Discharge Disposition:    Home or Self Care    Discharge Medications:       Discharge Medications      New Medications      Instructions Start Date   clopidogrel 75 MG tablet  Commonly known as: PLAVIX   75 mg, Oral, Daily      isosorbide mononitrate 30 MG 24 hr tablet  Commonly known as: IMDUR   30 mg, Oral, Every 24 Hours Scheduled   Start Date: December 15, 2022        Continue These Medications      Instructions Start Date   amLODIPine 5 MG tablet  Commonly known as: NORVASC   2.5 mg, Oral, Daily      aspirin 81 MG chewable tablet   81 mg, Oral, Daily      carvedilol 25 MG tablet  Commonly known as: COREG   12.5 mg, Oral, Daily, Per patient, he takes half a tablet once daily.      [employer] CALCIUM PO   Oral      multivitamin with minerals tablet tablet   1 tablet, Oral, Daily      nitroglycerin 0.4 MG SL tablet  Commonly known as: NITROSTAT   0.4 mg, Sublingual, Every 5 Minutes PRN, Take no more than 3 doses in 15 minutes.      NON FORMULARY   \"HARTHORNE\" 250 MG PO DAILY      NON FORMULARY   SEAWEED 100 MG PO DAILY      ondansetron ODT 4 MG disintegrating tablet  Commonly known as: ZOFRAN-ODT   4 mg, Sublingual, Every 6 Hours PRN      oxyCODONE-acetaminophen 5-325 MG per tablet  Commonly known as: PERCOCET   1 tablet, Oral, Every 6 Hours PRN      pantoprazole 40 MG EC tablet  Commonly known as: PROTONIX   40 mg, Oral, Daily      rosuvastatin 20 MG tablet  Commonly known as: CRESTOR   20 mg, Oral, Daily      VITAMIN C PO   Oral, Daily    "   Zinc 50 MG capsule   Oral, Daily           Discharge Diet:     Diet Instructions     Diet: Cardiac; Thin      Discharge Diet: Cardiac    Fluid Consistency: Thin        Activity at Discharge:     Activity Instructions     Activity as Tolerated          Follow-up Appointments:     Follow-up Information     Siddhartha Hicks MD Follow up in 2 week(s).    Specialties: Cardiology, Interventional Radiology  Why: [facility].  Contact information:  [address] [facility] [facility] [address]  [address]  [facility] [id number] [phone]             Devika Escamilla, DO Follow up in 1 week(s).    Specialty: Internal Medicine  Contact information:  [address] [id number] [phone]                       Test Results Pending at Discharge:    None.       Alberto Rodriguez MD  12/14/22  15:58 EST    Time: I spent 25 minutes on this discharge activity which included: face-to-face encounter with the patient, reviewing the data in the system, coordination of the care with the nursing staff as well as consultants, documentation, and entering orders.     Dictated utilizing Dragon dictation.

## 2022-12-15 LAB — TROPONIN T SERPL-MCNC: 0.06 NG/ML (ref 0–0.03)

## 2022-12-15 NOTE — OUTREACH NOTE
Prep Survey    Flowsheet Row Responses   [place] facility patient discharged from? [facility]   Is LACE score < 7 ? No   Eligibility Readm Mgmt   Discharge diagnosis Chest pain with borderline troponin elevation, POA.   Does the patient have one of the following disease processes/diagnoses(primary or secondary)? Other   Does the patient have Home health ordered? No   Is there a DME ordered? No   Medication alerts for this patient Plavix    Prep survey completed? Yes          FAWN KATHLEEN - Registered Nurse

## 2022-12-16 ENCOUNTER — READMISSION MANAGEMENT (OUTPATIENT)
Dept: CALL CENTER | Facility: HOSPITAL | Age: 87
End: 2022-12-16

## 2022-12-16 NOTE — OUTREACH NOTE
Medical Week 1 Survey    Flowsheet Row Responses   [facility] patient discharged from? [facility]   Does the patient have one of the following disease processes/diagnoses(primary or secondary)? Other   Week 1 attempt successful? Yes   Call start time 1735   Call end time 1739   Discharge diagnosis Chest pain with borderline troponin elevation, POA.   Meds reviewed with patient/caregiver? Yes   Is the patient having any side effects they believe may be caused by any medication additions or changes? No   Does the patient have all medications ordered at discharge? Yes   Is the patient taking all medications as directed (includes completed medication regime)? Yes   Does the patient have a primary care provider?  Yes   Does the patient have an appointment with their PCP within 7 days of discharge? Yes   Has the patient kept scheduled appointments due by today? N/A   Has home health visited the patient within 72 hours of discharge? N/A   Psychosocial issues? 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 Yes   Nursing interventions Reviewed instructions with patient   What is the patient's perception of their health status since discharge? Improving   Is the patient/caregiver able to teach back signs and symptoms related to disease process for when to call PCP? Yes   Is the patient/caregiver able to teach back signs and symptoms related to disease process for when to call 911? Yes   Is the patient/caregiver able to teach back the hierarchy of who to call/visit for symptoms/problems? PCP, Specialist, Home health nurse, Urgent Care, ED, 911 Yes   Additional teach back comments states has had no chest pain, states understanding of when to call MD if chest pain persists, will finish chemotherapy treatments, has 3 more starting 12/19/22   Week 1 call completed? Yes          NAZ JARQUIN - Registered Nurse

## 2022-12-18 NOTE — ED PROVIDER NOTES
Subjective   History of Present Illness  87-year-old male presents to the ED with a chief complaint of chest pain.  Patient is complaining of left-sided chest pain that started yesterday.  Pain is dull aching pain in his left chest radiates upward.  Pain is associated with shortness of breath.  No diaphoresis.  No lightheadedness or dizziness.  No fever chills.  No nausea vomiting diarrhea or abdominal pain.  No prior treatments or alleviating factors.  No prior evaluations.  No other complaints at this time.        Review of Systems   Constitutional: Negative for fatigue and fever.   Respiratory: Positive for shortness of breath.    Cardiovascular: Positive for chest pain.   All other systems reviewed and are negative.      Past Medical History:   Diagnosis Date   • Bradycardia    • Colon polyp    • Diabetes mellitus (HCC)    • Disease of thyroid gland    • History of stomach ulcers    • Hypertension    • Impaired functional mobility, balance, gait, and endurance    • Myocardial infarction (HCC)        Allergies   Allergen Reactions   • Novocain [Procaine] Other (See Comments)     Side pain       Past Surgical History:   Procedure Laterality Date   • CARDIAC CATHETERIZATION N/A 10/22/2017    Procedure: Coronary angiography;  Surgeon: Siddhartha Hicks MD;  Location: [facility] CATH INVASIVE LOCATION;  Service:    • CARDIAC CATHETERIZATION N/A 10/22/2017    Procedure: Left Heart Cath;  Surgeon: Siddhartha Hicks MD;  Location: [facility] CATH INVASIVE LOCATION;  Service:    • CARDIAC CATHETERIZATION N/A 10/22/2017    Procedure: Angioplasty-coronary;  Surgeon: Siddhartha Hicks MD;  Location: [facility] CATH INVASIVE LOCATION;  Service:    • CHOLECYSTECTOMY WITH INTRAOPERATIVE CHOLANGIOGRAM N/A 1/12/2020    Procedure: CHOLECYSTECTOMY LAPAROSCOPIC INTRAOPERATIVE CHOLANGIOGRAM;  Surgeon: Perla Baez MD;  Location: [facility] OR;  Service: General   • ORTHOPEDIC SURGERY     • PACEMAKER IMPLANTATION     • GA RT/LT HEART CATHETERS  N/A 10/22/2017    Procedure: Percutaneous Coronary Intervention;  Surgeon: Siddhartha Hicks MD;  Location: [facility] INVASIVE LOCATION;  Service: Cardiovascular   • ROTATOR CUFF REPAIR Left 2015   • VAGOTOMY AND PYLOROPLASTY  1970    Ex-lap with ulcer repair (suspected Berto patch) with vagotomy and pyloroplasty for perforated peptic ulcer       Family History   Problem Relation Age of Onset   • Heart attack Mother    • Arrhythmia Mother         PACEMAKER   • Hyperlipidemia Mother    • Hypertension Mother    • Thyroid disease Mother    • Stroke Mother    • Obesity Mother        Social History     Socioeconomic History   • Marital status:    Tobacco Use   • Smoking status: Former     Types: Cigarettes     Quit date:      Years since quittin.9   • Smokeless tobacco: Never   Substance and Sexual Activity   • Alcohol use: No   • Drug use: No   • Sexual activity: Defer           Objective   Physical Exam  Vitals and nursing note reviewed.   Constitutional:       General: He is not in acute distress.     Appearance: He is not diaphoretic.      Comments: Chronically ill-appearing   HENT:      Head: Normocephalic and atraumatic.      Nose: Nose normal.   Eyes:      Conjunctiva/sclera: Conjunctivae normal.      Pupils: Pupils are equal, round, and reactive to light.   Cardiovascular:      Rate and Rhythm: Normal rate and regular rhythm.   Pulmonary:      Effort: Pulmonary effort is normal. No respiratory distress.      Breath sounds: Normal breath sounds.   Abdominal:      General: There is no distension.      Palpations: Abdomen is soft.      Tenderness: There is no abdominal tenderness.   Musculoskeletal:         General: No deformity.   Neurological:      Mental Status: He is alert and oriented to person, place, and time.      Cranial Nerves: No cranial nerve deficit.      Coordination: Coordination normal.         Procedures           ED Course  ED Course as of 22 1426   Tue Dec 13, 2022   1206  EKG interpreted by me.  Sinus rhythm.  First-degree AV block.  Rate of 66.  Nonspecific ST segment depressions.  Abnormal EKG [CG]      ED Course User Index  [CG] Heriberto Wade, DO                                           MDM  87-year-old male presented to the ED with chest pain.  His initial EKG shows some nonspecific ST segment depressions.  Initial troponin minimally elevated.  Patient was significantly hyperglycemic.  This will be treated appropriately.  Patient's further work-up showed slightly increasing his troponin.  Given the history of his chest pain and increasing troponins discussed with cardiology will start on heparin.  Will admit and trend troponins.        Final diagnoses:   Unstable angina (HCC)   Chest pain, unspecified type   Hyperglycemia       ED Disposition  ED Disposition     ED Disposition   Decision to Admit    Condition   --    Comment   Level of Care: Telemetry [5]   Diagnosis: Unstable angina (HCC) [205883]   Admitting Physician: MARICHUY MAJOR [[id number]]   Attending Physician: MARICHUY MAJOR [[id number]]               Siddhartha Hicks MD  [address] [facility] [address]  [facility] [address]  [place] [id number] [phone]    Follow up in 2 week(s)  [facility] if patient prefers.    Devika Escamilla DO  [address] [place] [id number] [phone]    Follow up in 1 week(s)           Medication List      New Prescriptions    clopidogrel 75 MG tablet  Commonly known as: PLAVIX  Take 1 tablet by mouth Daily.     isosorbide mononitrate 30 MG 24 hr tablet  Commonly known as: IMDUR  Take 1 tablet by mouth Daily.           Where to Get Your Medications      These medications were sent to [facility]/pharmacy #[id number] - [place], [place] - [address] [place] [address] - [phone]  - [phone]   [address] [facility] [address]    Phone: [phone]   · clopidogrel 75 MG tablet  · isosorbide mononitrate 30 MG 24 hr tablet     Information about where to get these medications is  not yet available    Ask your nurse or doctor about these medications  · amLODIPine 5 MG tablet  · carvedilol 25 MG tablet          Heriberto Wade, DO  12/18/22 1424       Heriberto Wade, DO  12/18/22 1429

## 2023-01-10 NOTE — PROGRESS NOTES
Pt was seen today in [facility] for a Phase 3 visit.     The following labs were labeled with appropriate pt sticker and tubed to lab:      [] Blue                           [x] Lavender                [] on ice  [x] Green/yellow  [] Green/black           [] on ice  [x] Grey                       [x] on ice  [] Yellow  [x] Red  [] Type/ Screen  [] ABG  [] VBG     [] COVID-19 swab    [] Rapid  [] PCR  [] Flu swab  [] Peds Viral Panel      [x] Urine Sample  [] Fecal Sample  [] Pelvic Cultures  [] Blood Cultures       [] X 2  [] STREP Cultures     Carry LUZMA Lambert  01/10/23 8612

## 2023-04-14 NOTE — ANESTHESIA PROCEDURE NOTES
Airway  Urgency: elective    Date/Time: 1/12/2020 9:20 AM  Airway not difficult    General Information and Staff    Patient location during procedure: OR  CRNA: Lucian Sims CRNA    Indications and Patient Condition  Indications for airway management: airway protection    Preoxygenated: yes  Mask difficulty assessment: 1 - vent by mask    Final Airway Details  Final airway type: endotracheal airway      Successful airway: ETT  Cuffed: yes   Successful intubation technique: direct laryngoscopy  Facilitating devices/methods: cricoid pressure  Endotracheal tube insertion site: oral  Blade: Luis  Blade size: 4  ETT size (mm): 7.5  Cormack-Lehane Classification: grade IIa - partial view of glottis  Placement verified by: chest auscultation and capnometry   Measured from: lips  ETT/EBT  to lips (cm): 21  Number of attempts at approach: 1               April 14, 2023    Ceferino Crouch  [address] [facility] [address]          Dear ,    We are writing to inform you of your test results.  Your A1C was normal, which means you don't have diabetes or pre-diabetes.     Resulted Orders   Hemoglobin A1c   Result Value Ref Range    Hemoglobin A1C 5.5 0.0 - 5.6 %      Comment:      Normal <5.7%   Prediabetes 5.7-6.4%    Diabetes 6.5% or higher     Note: Adopted from ADA consensus guidelines.       If you have any questions or concerns, please call the clinic at the number listed above.       Sincerely,      Arturo Coffey MD

## (undated) DEVICE — GLIDESHEATH SLENDER STAINLESS STEEL KIT: Brand: GLIDESHEATH SLENDER

## (undated) DEVICE — ELECTRD PAD DEFIB A/

## (undated) DEVICE — GLV SURG ULTRATOUCH BIOGEL/COAT PF LF SZ6 STRL

## (undated) DEVICE — MONOPOLAR METZENBAUM SCISSOR, MINI BLADE TIP, DISPOSABLE: Brand: MONOPOLAR METZENBAUM SCISSOR, MINI BLADE TIP, DISPOSABLE

## (undated) DEVICE — NDL ANGIOGR ADV THN SMOTH SGLWALL 21G 1.5

## (undated) DEVICE — ENDOPATH XCEL BLADELESS TROCARS WITH STABILITY SLEEVES: Brand: ENDOPATH XCEL

## (undated) DEVICE — CATH DIAG IMPULSE AL3 6F 100CM

## (undated) DEVICE — GUIDE CATHETER: Brand: MACH1™

## (undated) DEVICE — SYR LL TP 10ML STRL

## (undated) DEVICE — GLV SURG SENSICARE GREEN W/ALOE PF LF 6 STRL

## (undated) DEVICE — DRSNG SURESITE WNDW 4X4.5

## (undated) DEVICE — RUNTHROUGH NS EXTRA FLOPPY PTCA GUIDEWIRE: Brand: RUNTHROUGH

## (undated) DEVICE — DISPOSABLE MONOPOLAR ENDOSCOPIC CORD 10 FT. (3M): Brand: KIRWAN

## (undated) DEVICE — TR BAND RADIAL ARTERY COMPRESSION DEVICE: Brand: TR BAND

## (undated) DEVICE — Device

## (undated) DEVICE — TREK CORONARY DILATATION CATHETER 2.50 MM X 15 MM / RAPID-EXCHANGE: Brand: TREK

## (undated) DEVICE — ANGIOGRAPHIC CATHETER: Brand: EXPO™

## (undated) DEVICE — TP ELECTRD LAP L WR SPLIT33CM

## (undated) DEVICE — LAPAROSCOPIC DISSECTOR: Brand: DEROYAL

## (undated) DEVICE — UNDYED BRAIDED (POLYGLACTIN 910), SYNTHETIC ABSORBABLE SUTURE: Brand: COATED VICRYL

## (undated) DEVICE — ENDOPOUCH RETRIEVER SPECIMEN RETRIEVAL BAGS: Brand: ENDOPOUCH RETRIEVER

## (undated) DEVICE — APPL HEMOS FOR DELIVERY FLOSEAL

## (undated) DEVICE — ENDOPATH XCEL BLUNT TIP TROCARS WITH SMOOTH SLEEVES: Brand: ENDOPATH XCEL

## (undated) DEVICE — GW INQWIRE FC PTFE STD J/1.5 .035 260

## (undated) DEVICE — ENDOPATH XCEL UNIVERSAL TROCAR STABLILITY SLEEVES: Brand: ENDOPATH XCEL

## (undated) DEVICE — RICH GENERAL LAPAROSCOPY: Brand: MEDLINE INDUSTRIES, INC.

## (undated) DEVICE — NDL HYPO ECLPS SFTY 22G 1 1/2IN

## (undated) DEVICE — CUFF SCD HEMOFORCE SEQ CALF STD MD

## (undated) DEVICE — SUT VIC 0/0 UR6 27IN DYED J603H

## (undated) DEVICE — SPNG GZ STRL 2S 4X4 12PLY

## (undated) DEVICE — 2, DISPOSABLE SUCTION/IRRIGATOR WITHOUT DISPOSABLE TIP: Brand: STRYKEFLOW

## (undated) DEVICE — ADHS LIQ MASTISOL 2/3ML

## (undated) DEVICE — SOL NACL 0.9PCT 1000ML